# Patient Record
Sex: FEMALE | Race: WHITE | Employment: OTHER | URBAN - METROPOLITAN AREA
[De-identification: names, ages, dates, MRNs, and addresses within clinical notes are randomized per-mention and may not be internally consistent; named-entity substitution may affect disease eponyms.]

---

## 2017-01-01 ENCOUNTER — APPOINTMENT (OUTPATIENT)
Dept: GENERAL RADIOLOGY | Age: 82
DRG: 871 | End: 2017-01-01
Attending: STUDENT IN AN ORGANIZED HEALTH CARE EDUCATION/TRAINING PROGRAM
Payer: MEDICARE

## 2017-01-01 ENCOUNTER — APPOINTMENT (OUTPATIENT)
Dept: GENERAL RADIOLOGY | Age: 82
DRG: 871 | End: 2017-01-01
Attending: EMERGENCY MEDICINE
Payer: MEDICARE

## 2017-01-01 ENCOUNTER — APPOINTMENT (OUTPATIENT)
Dept: ULTRASOUND IMAGING | Age: 82
DRG: 871 | End: 2017-01-01
Attending: INTERNAL MEDICINE
Payer: MEDICARE

## 2017-01-01 ENCOUNTER — HOSPICE ADMISSION (OUTPATIENT)
Dept: HOSPICE | Facility: HOSPICE | Age: 82
End: 2017-01-01

## 2017-01-01 ENCOUNTER — HOSPITAL ENCOUNTER (INPATIENT)
Age: 82
LOS: 16 days | Discharge: NURSING, REHAB OR LONG-TERM CARE | DRG: 871 | End: 2017-03-10
Attending: EMERGENCY MEDICINE | Admitting: STUDENT IN AN ORGANIZED HEALTH CARE EDUCATION/TRAINING PROGRAM
Payer: MEDICARE

## 2017-01-01 ENCOUNTER — APPOINTMENT (OUTPATIENT)
Dept: CT IMAGING | Age: 82
DRG: 871 | End: 2017-01-01
Attending: STUDENT IN AN ORGANIZED HEALTH CARE EDUCATION/TRAINING PROGRAM
Payer: MEDICARE

## 2017-01-01 VITALS
DIASTOLIC BLOOD PRESSURE: 57 MMHG | HEIGHT: 62 IN | SYSTOLIC BLOOD PRESSURE: 121 MMHG | OXYGEN SATURATION: 93 % | TEMPERATURE: 97.7 F | WEIGHT: 259.7 LBS | RESPIRATION RATE: 18 BRPM | BODY MASS INDEX: 47.79 KG/M2 | HEART RATE: 83 BPM

## 2017-01-01 DIAGNOSIS — R09.02 HYPOXIA: ICD-10-CM

## 2017-01-01 DIAGNOSIS — R12 HEART BURN: ICD-10-CM

## 2017-01-01 DIAGNOSIS — E86.0 DEHYDRATION: ICD-10-CM

## 2017-01-01 DIAGNOSIS — N39.0 URINARY TRACT INFECTION WITHOUT HEMATURIA, SITE UNSPECIFIED: ICD-10-CM

## 2017-01-01 DIAGNOSIS — R53.81 DEBILITY: ICD-10-CM

## 2017-01-01 DIAGNOSIS — I50.31 ACUTE DIASTOLIC CONGESTIVE HEART FAILURE (HCC): Primary | ICD-10-CM

## 2017-01-01 DIAGNOSIS — R06.02 SHORTNESS OF BREATH: ICD-10-CM

## 2017-01-01 DIAGNOSIS — F41.9 ANXIETY: ICD-10-CM

## 2017-01-01 DIAGNOSIS — Z66 DNR (DO NOT RESUSCITATE): ICD-10-CM

## 2017-01-01 DIAGNOSIS — Z71.89 GOALS OF CARE, COUNSELING/DISCUSSION: ICD-10-CM

## 2017-01-01 LAB
ALBUMIN SERPL BCP-MCNC: 1.5 G/DL (ref 3.5–5)
ALBUMIN SERPL BCP-MCNC: 1.6 G/DL (ref 3.5–5)
ALBUMIN SERPL BCP-MCNC: 1.7 G/DL (ref 3.5–5)
ALBUMIN SERPL BCP-MCNC: 1.9 G/DL (ref 3.5–5)
ALBUMIN/GLOB SERPL: 0.3 {RATIO} (ref 1.1–2.2)
ALBUMIN/GLOB SERPL: 0.4 {RATIO} (ref 1.1–2.2)
ALBUMIN/GLOB SERPL: 0.5 {RATIO} (ref 1.1–2.2)
ALP SERPL-CCNC: 213 U/L (ref 45–117)
ALP SERPL-CCNC: 228 U/L (ref 45–117)
ALP SERPL-CCNC: 233 U/L (ref 45–117)
ALP SERPL-CCNC: 235 U/L (ref 45–117)
ALP SERPL-CCNC: 264 U/L (ref 45–117)
ALP SERPL-CCNC: 270 U/L (ref 45–117)
ALT SERPL-CCNC: 47 U/L (ref 12–78)
ALT SERPL-CCNC: 60 U/L (ref 12–78)
ALT SERPL-CCNC: 71 U/L (ref 12–78)
ALT SERPL-CCNC: 81 U/L (ref 12–78)
ALT SERPL-CCNC: 94 U/L (ref 12–78)
ALT SERPL-CCNC: 97 U/L (ref 12–78)
ANION GAP BLD CALC-SCNC: 12 MMOL/L (ref 5–15)
ANION GAP BLD CALC-SCNC: 13 MMOL/L (ref 5–15)
ANION GAP BLD CALC-SCNC: 13 MMOL/L (ref 5–15)
ANION GAP BLD CALC-SCNC: 14 MMOL/L (ref 5–15)
ANION GAP BLD CALC-SCNC: 8 MMOL/L (ref 5–15)
APPEARANCE UR: ABNORMAL
APTT PPP: 25.1 SEC (ref 22.1–32.5)
ARTERIAL PATENCY WRIST A: ABNORMAL
AST SERPL W P-5'-P-CCNC: 128 U/L (ref 15–37)
AST SERPL W P-5'-P-CCNC: 141 U/L (ref 15–37)
AST SERPL W P-5'-P-CCNC: 155 U/L (ref 15–37)
AST SERPL W P-5'-P-CCNC: 171 U/L (ref 15–37)
AST SERPL W P-5'-P-CCNC: 213 U/L (ref 15–37)
AST SERPL W P-5'-P-CCNC: 93 U/L (ref 15–37)
ATRIAL RATE: 87 BPM
BACTERIA SPEC CULT: NORMAL
BACTERIA URNS QL MICRO: ABNORMAL /HPF
BASE DEFICIT BLD-SCNC: 5 MMOL/L
BASOPHILS # BLD AUTO: 0 K/UL (ref 0–0.1)
BASOPHILS # BLD: 0 % (ref 0–1)
BDY SITE: ABNORMAL
BILIRUB SERPL-MCNC: 0.6 MG/DL (ref 0.2–1)
BILIRUB SERPL-MCNC: 0.7 MG/DL (ref 0.2–1)
BILIRUB SERPL-MCNC: 0.7 MG/DL (ref 0.2–1)
BILIRUB SERPL-MCNC: 0.8 MG/DL (ref 0.2–1)
BILIRUB UR QL CFM: NEGATIVE
BNP SERPL-MCNC: 1947 PG/ML (ref 0–100)
BUN SERPL-MCNC: 17 MG/DL (ref 6–20)
BUN SERPL-MCNC: 18 MG/DL (ref 6–20)
BUN SERPL-MCNC: 18 MG/DL (ref 6–20)
BUN SERPL-MCNC: 19 MG/DL (ref 6–20)
BUN SERPL-MCNC: 19 MG/DL (ref 6–20)
BUN SERPL-MCNC: 20 MG/DL (ref 6–20)
BUN SERPL-MCNC: 21 MG/DL (ref 6–20)
BUN/CREAT SERPL: 10 (ref 12–20)
BUN/CREAT SERPL: 14 (ref 12–20)
BUN/CREAT SERPL: 15 (ref 12–20)
BUN/CREAT SERPL: 15 (ref 12–20)
BUN/CREAT SERPL: 16 (ref 12–20)
BUN/CREAT SERPL: 18 (ref 12–20)
BUN/CREAT SERPL: 23 (ref 12–20)
CALCIUM SERPL-MCNC: 7.6 MG/DL (ref 8.5–10.1)
CALCIUM SERPL-MCNC: 7.6 MG/DL (ref 8.5–10.1)
CALCIUM SERPL-MCNC: 7.7 MG/DL (ref 8.5–10.1)
CALCIUM SERPL-MCNC: 7.8 MG/DL (ref 8.5–10.1)
CALCIUM SERPL-MCNC: 7.8 MG/DL (ref 8.5–10.1)
CALCIUM SERPL-MCNC: 7.9 MG/DL (ref 8.5–10.1)
CALCIUM SERPL-MCNC: 8 MG/DL (ref 8.5–10.1)
CALCULATED P AXIS, ECG09: 21 DEGREES
CALCULATED R AXIS, ECG10: 177 DEGREES
CALCULATED T AXIS, ECG11: -15 DEGREES
CHLORIDE SERPL-SCNC: 101 MMOL/L (ref 97–108)
CHLORIDE SERPL-SCNC: 103 MMOL/L (ref 97–108)
CHLORIDE SERPL-SCNC: 106 MMOL/L (ref 97–108)
CHLORIDE SERPL-SCNC: 107 MMOL/L (ref 97–108)
CHLORIDE SERPL-SCNC: 108 MMOL/L (ref 97–108)
CHLORIDE SERPL-SCNC: 108 MMOL/L (ref 97–108)
CHLORIDE SERPL-SCNC: 111 MMOL/L (ref 97–108)
CK SERPL-CCNC: 38 U/L (ref 26–192)
CO2 SERPL-SCNC: 19 MMOL/L (ref 21–32)
CO2 SERPL-SCNC: 19 MMOL/L (ref 21–32)
CO2 SERPL-SCNC: 20 MMOL/L (ref 21–32)
CO2 SERPL-SCNC: 21 MMOL/L (ref 21–32)
CO2 SERPL-SCNC: 24 MMOL/L (ref 21–32)
COLOR UR: ABNORMAL
CREAT SERPL-MCNC: 0.9 MG/DL (ref 0.55–1.02)
CREAT SERPL-MCNC: 1.01 MG/DL (ref 0.55–1.02)
CREAT SERPL-MCNC: 1.14 MG/DL (ref 0.55–1.02)
CREAT SERPL-MCNC: 1.22 MG/DL (ref 0.55–1.02)
CREAT SERPL-MCNC: 1.26 MG/DL (ref 0.55–1.02)
CREAT SERPL-MCNC: 1.4 MG/DL (ref 0.55–1.02)
CREAT SERPL-MCNC: 1.89 MG/DL (ref 0.55–1.02)
DIAGNOSIS, 93000: NORMAL
DIFFERENTIAL METHOD BLD: ABNORMAL
EOSINOPHIL # BLD: 0 K/UL (ref 0–0.4)
EOSINOPHIL # BLD: 0 K/UL (ref 0–0.4)
EOSINOPHIL # BLD: 0.1 K/UL (ref 0–0.4)
EOSINOPHIL # BLD: 0.1 K/UL (ref 0–0.4)
EOSINOPHIL NFR BLD: 0 % (ref 0–7)
EOSINOPHIL NFR BLD: 1 % (ref 0–7)
EPITH CASTS URNS QL MICRO: ABNORMAL /LPF
ERYTHROCYTE [DISTWIDTH] IN BLOOD BY AUTOMATED COUNT: 19.2 % (ref 11.5–14.5)
ERYTHROCYTE [DISTWIDTH] IN BLOOD BY AUTOMATED COUNT: 19.6 % (ref 11.5–14.5)
ERYTHROCYTE [DISTWIDTH] IN BLOOD BY AUTOMATED COUNT: 19.8 % (ref 11.5–14.5)
ERYTHROCYTE [DISTWIDTH] IN BLOOD BY AUTOMATED COUNT: 20.4 % (ref 11.5–14.5)
ERYTHROCYTE [DISTWIDTH] IN BLOOD BY AUTOMATED COUNT: 20.7 % (ref 11.5–14.5)
FLUAV AG NPH QL IA: NEGATIVE
FLUBV AG NOSE QL IA: NEGATIVE
GAS FLOW.O2 O2 DELIVERY SYS: ABNORMAL L/MIN
GLOBULIN SER CALC-MCNC: 3.7 G/DL (ref 2–4)
GLOBULIN SER CALC-MCNC: 3.8 G/DL (ref 2–4)
GLOBULIN SER CALC-MCNC: 3.8 G/DL (ref 2–4)
GLOBULIN SER CALC-MCNC: 3.9 G/DL (ref 2–4)
GLOBULIN SER CALC-MCNC: 4.4 G/DL (ref 2–4)
GLOBULIN SER CALC-MCNC: 4.6 G/DL (ref 2–4)
GLUCOSE SERPL-MCNC: 102 MG/DL (ref 65–100)
GLUCOSE SERPL-MCNC: 103 MG/DL (ref 65–100)
GLUCOSE SERPL-MCNC: 115 MG/DL (ref 65–100)
GLUCOSE SERPL-MCNC: 122 MG/DL (ref 65–100)
GLUCOSE SERPL-MCNC: 134 MG/DL (ref 65–100)
GLUCOSE SERPL-MCNC: 148 MG/DL (ref 65–100)
GLUCOSE SERPL-MCNC: 163 MG/DL (ref 65–100)
GLUCOSE SERPL-MCNC: ABNORMAL MG/DL (ref 65–100)
GLUCOSE UR STRIP.AUTO-MCNC: NEGATIVE MG/DL
HCO3 BLD-SCNC: 20.4 MMOL/L (ref 22–26)
HCT VFR BLD AUTO: 33.8 % (ref 35–47)
HCT VFR BLD AUTO: 36.1 % (ref 35–47)
HCT VFR BLD AUTO: 36.3 % (ref 35–47)
HCT VFR BLD AUTO: 42.6 % (ref 35–47)
HCT VFR BLD AUTO: 48 % (ref 35–47)
HGB BLD-MCNC: 11.7 G/DL (ref 11.5–16)
HGB BLD-MCNC: 12 G/DL (ref 11.5–16)
HGB BLD-MCNC: 12.1 G/DL (ref 11.5–16)
HGB BLD-MCNC: 14.1 G/DL (ref 11.5–16)
HGB BLD-MCNC: 16.7 G/DL (ref 11.5–16)
HGB UR QL STRIP: NEGATIVE
INR PPP: 1.2 (ref 0.9–1.1)
INR PPP: 1.4 (ref 0.9–1.1)
KETONES UR QL STRIP.AUTO: NEGATIVE MG/DL
LACTATE SERPL-SCNC: 2.1 MMOL/L (ref 0.4–2)
LACTATE SERPL-SCNC: 3.3 MMOL/L (ref 0.4–2)
LACTATE SERPL-SCNC: 5.1 MMOL/L (ref 0.4–2)
LEUKOCYTE ESTERASE UR QL STRIP.AUTO: ABNORMAL
LIPASE SERPL-CCNC: 49 U/L (ref 73–393)
LIPASE SERPL-CCNC: NORMAL U/L (ref 73–393)
LYMPHOCYTES # BLD AUTO: 13 % (ref 12–49)
LYMPHOCYTES # BLD AUTO: 14 % (ref 12–49)
LYMPHOCYTES # BLD AUTO: 20 % (ref 12–49)
LYMPHOCYTES # BLD AUTO: 20 % (ref 12–49)
LYMPHOCYTES # BLD: 1.7 K/UL (ref 0.8–3.5)
LYMPHOCYTES # BLD: 2 K/UL (ref 0.8–3.5)
LYMPHOCYTES # BLD: 2.3 K/UL (ref 0.8–3.5)
LYMPHOCYTES # BLD: 2.4 K/UL (ref 0.8–3.5)
MAGNESIUM SERPL-MCNC: 1.5 MG/DL (ref 1.6–2.4)
MAGNESIUM SERPL-MCNC: 1.7 MG/DL (ref 1.6–2.4)
MAGNESIUM SERPL-MCNC: 1.8 MG/DL (ref 1.6–2.4)
MAGNESIUM SERPL-MCNC: 1.9 MG/DL (ref 1.6–2.4)
MAGNESIUM SERPL-MCNC: 2.1 MG/DL (ref 1.6–2.4)
MCH RBC QN AUTO: 29.2 PG (ref 26–34)
MCH RBC QN AUTO: 29.3 PG (ref 26–34)
MCH RBC QN AUTO: 29.7 PG (ref 26–34)
MCH RBC QN AUTO: 29.9 PG (ref 26–34)
MCH RBC QN AUTO: 30.4 PG (ref 26–34)
MCHC RBC AUTO-ENTMCNC: 33.1 G/DL (ref 30–36.5)
MCHC RBC AUTO-ENTMCNC: 33.1 G/DL (ref 30–36.5)
MCHC RBC AUTO-ENTMCNC: 33.5 G/DL (ref 30–36.5)
MCHC RBC AUTO-ENTMCNC: 34.6 G/DL (ref 30–36.5)
MCHC RBC AUTO-ENTMCNC: 34.8 G/DL (ref 30–36.5)
MCV RBC AUTO: 86.4 FL (ref 80–99)
MCV RBC AUTO: 87 FL (ref 80–99)
MCV RBC AUTO: 87.4 FL (ref 80–99)
MCV RBC AUTO: 88.8 FL (ref 80–99)
MCV RBC AUTO: 89.9 FL (ref 80–99)
MONOCYTES # BLD: 0.9 K/UL (ref 0–1)
MONOCYTES # BLD: 1.1 K/UL (ref 0–1)
MONOCYTES # BLD: 1.2 K/UL (ref 0–1)
MONOCYTES # BLD: 1.5 K/UL (ref 0–1)
MONOCYTES NFR BLD AUTO: 10 % (ref 5–13)
MONOCYTES NFR BLD AUTO: 8 % (ref 5–13)
NEUTS SEG # BLD: 11.6 K/UL (ref 1.8–8)
NEUTS SEG # BLD: 7.8 K/UL (ref 1.8–8)
NEUTS SEG # BLD: 8.2 K/UL (ref 1.8–8)
NEUTS SEG # BLD: 9.2 K/UL (ref 1.8–8)
NEUTS SEG NFR BLD AUTO: 69 % (ref 32–75)
NEUTS SEG NFR BLD AUTO: 70 % (ref 32–75)
NEUTS SEG NFR BLD AUTO: 77 % (ref 32–75)
NEUTS SEG NFR BLD AUTO: 78 % (ref 32–75)
NITRITE UR QL STRIP.AUTO: NEGATIVE
NRBC # BLD: 0.02 K/UL (ref 0–0.01)
NRBC # BLD: 0.03 K/UL (ref 0–0.01)
NRBC # BLD: 0.14 K/UL (ref 0–0.01)
NRBC BLD-RTO: 0.1 PER 100 WBC
NRBC BLD-RTO: 0.3 PER 100 WBC
NRBC BLD-RTO: 1.3 PER 100 WBC
O2/TOTAL GAS SETTING VFR VENT: 100 %
P-R INTERVAL, ECG05: 176 MS
PCO2 BLD: 36 MMHG (ref 35–45)
PEEP RESPIRATORY: 5 CMH2O
PH BLD: 7.36 [PH] (ref 7.35–7.45)
PH UR STRIP: 5 [PH] (ref 5–8)
PHOSPHATE SERPL-MCNC: 4.1 MG/DL (ref 2.6–4.7)
PHOSPHATE SERPL-MCNC: 5.8 MG/DL (ref 2.6–4.7)
PIP ISTAT,IPIP: 12
PLATELET # BLD AUTO: 228 K/UL (ref 150–400)
PLATELET # BLD AUTO: 242 K/UL (ref 150–400)
PLATELET # BLD AUTO: 261 K/UL (ref 150–400)
PLATELET # BLD AUTO: 269 K/UL (ref 150–400)
PLATELET # BLD AUTO: 273 K/UL (ref 150–400)
PLATELET COMMENTS,PCOM: ABNORMAL
PO2 BLD: 187 MMHG (ref 80–100)
POTASSIUM SERPL-SCNC: 3.1 MMOL/L (ref 3.5–5.1)
POTASSIUM SERPL-SCNC: 3.2 MMOL/L (ref 3.5–5.1)
POTASSIUM SERPL-SCNC: 3.4 MMOL/L (ref 3.5–5.1)
POTASSIUM SERPL-SCNC: 3.7 MMOL/L (ref 3.5–5.1)
POTASSIUM SERPL-SCNC: 3.8 MMOL/L (ref 3.5–5.1)
POTASSIUM SERPL-SCNC: 4.5 MMOL/L (ref 3.5–5.1)
POTASSIUM SERPL-SCNC: 4.6 MMOL/L (ref 3.5–5.1)
PROT SERPL-MCNC: 5.4 G/DL (ref 6.4–8.2)
PROT SERPL-MCNC: 5.5 G/DL (ref 6.4–8.2)
PROT SERPL-MCNC: 6.1 G/DL (ref 6.4–8.2)
PROT SERPL-MCNC: 6.3 G/DL (ref 6.4–8.2)
PROT UR STRIP-MCNC: NEGATIVE MG/DL
PROTHROMBIN TIME: 12.1 SEC (ref 9–11.1)
PROTHROMBIN TIME: 14.7 SEC (ref 9–11.1)
Q-T INTERVAL, ECG07: 430 MS
QRS DURATION, ECG06: 100 MS
QTC CALCULATION (BEZET), ECG08: 517 MS
RBC # BLD AUTO: 3.91 M/UL (ref 3.8–5.2)
RBC # BLD AUTO: 4.09 M/UL (ref 3.8–5.2)
RBC # BLD AUTO: 4.15 M/UL (ref 3.8–5.2)
RBC # BLD AUTO: 4.74 M/UL (ref 3.8–5.2)
RBC # BLD AUTO: 5.49 M/UL (ref 3.8–5.2)
RBC #/AREA URNS HPF: ABNORMAL /HPF (ref 0–5)
RBC MORPH BLD: ABNORMAL
SAO2 % BLD: 100 % (ref 92–97)
SERVICE CMNT-IMP: NORMAL
SODIUM SERPL-SCNC: 133 MMOL/L (ref 136–145)
SODIUM SERPL-SCNC: 138 MMOL/L (ref 136–145)
SODIUM SERPL-SCNC: 138 MMOL/L (ref 136–145)
SODIUM SERPL-SCNC: 139 MMOL/L (ref 136–145)
SODIUM SERPL-SCNC: 140 MMOL/L (ref 136–145)
SODIUM SERPL-SCNC: 140 MMOL/L (ref 136–145)
SODIUM SERPL-SCNC: 143 MMOL/L (ref 136–145)
SP GR UR REFRACTOMETRY: 1.02 (ref 1–1.03)
SPECIMEN TYPE: ABNORMAL
THERAPEUTIC RANGE,PTTT: NORMAL SECS (ref 58–77)
TOTAL RESP. RATE, ITRR: 30
TROPONIN I SERPL-MCNC: 0.04 NG/ML
TSH SERPL DL<=0.05 MIU/L-ACNC: 15.5 UIU/ML (ref 0.36–3.74)
UROBILINOGEN UR QL STRIP.AUTO: 1 EU/DL (ref 0.2–1)
VANCOMYCIN SERPL-MCNC: 12.7 UG/ML
VENTRICULAR RATE, ECG03: 87 BPM
WBC # BLD AUTO: 11.2 K/UL (ref 3.6–11)
WBC # BLD AUTO: 11.3 K/UL (ref 3.6–11)
WBC # BLD AUTO: 11.8 K/UL (ref 3.6–11)
WBC # BLD AUTO: 11.9 K/UL (ref 3.6–11)
WBC # BLD AUTO: 15.1 K/UL (ref 3.6–11)
WBC NRBC COR # BLD: ABNORMAL 10*3/UL
WBC URNS QL MICRO: ABNORMAL /HPF (ref 0–4)

## 2017-01-01 PROCEDURE — 36600 WITHDRAWAL OF ARTERIAL BLOOD: CPT

## 2017-01-01 PROCEDURE — 74011250637 HC RX REV CODE- 250/637: Performed by: INTERNAL MEDICINE

## 2017-01-01 PROCEDURE — 65270000029 HC RM PRIVATE

## 2017-01-01 PROCEDURE — 80202 ASSAY OF VANCOMYCIN: CPT | Performed by: STUDENT IN AN ORGANIZED HEALTH CARE EDUCATION/TRAINING PROGRAM

## 2017-01-01 PROCEDURE — 77010033678 HC OXYGEN DAILY

## 2017-01-01 PROCEDURE — 74011000258 HC RX REV CODE- 258: Performed by: INTERNAL MEDICINE

## 2017-01-01 PROCEDURE — 36415 COLL VENOUS BLD VENIPUNCTURE: CPT | Performed by: HOSPITALIST

## 2017-01-01 PROCEDURE — 74011250636 HC RX REV CODE- 250/636: Performed by: EMERGENCY MEDICINE

## 2017-01-01 PROCEDURE — 85025 COMPLETE CBC W/AUTO DIFF WBC: CPT | Performed by: HOSPITALIST

## 2017-01-01 PROCEDURE — 74011000250 HC RX REV CODE- 250: Performed by: INTERNAL MEDICINE

## 2017-01-01 PROCEDURE — 74011250636 HC RX REV CODE- 250/636: Performed by: INTERNAL MEDICINE

## 2017-01-01 PROCEDURE — C1751 CATH, INF, PER/CENT/MIDLINE: HCPCS

## 2017-01-01 PROCEDURE — 83605 ASSAY OF LACTIC ACID: CPT | Performed by: STUDENT IN AN ORGANIZED HEALTH CARE EDUCATION/TRAINING PROGRAM

## 2017-01-01 PROCEDURE — 83605 ASSAY OF LACTIC ACID: CPT | Performed by: EMERGENCY MEDICINE

## 2017-01-01 PROCEDURE — 84484 ASSAY OF TROPONIN QUANT: CPT | Performed by: EMERGENCY MEDICINE

## 2017-01-01 PROCEDURE — 74011250637 HC RX REV CODE- 250/637: Performed by: STUDENT IN AN ORGANIZED HEALTH CARE EDUCATION/TRAINING PROGRAM

## 2017-01-01 PROCEDURE — 83735 ASSAY OF MAGNESIUM: CPT | Performed by: INTERNAL MEDICINE

## 2017-01-01 PROCEDURE — C9113 INJ PANTOPRAZOLE SODIUM, VIA: HCPCS | Performed by: INTERNAL MEDICINE

## 2017-01-01 PROCEDURE — 36415 COLL VENOUS BLD VENIPUNCTURE: CPT | Performed by: STUDENT IN AN ORGANIZED HEALTH CARE EDUCATION/TRAINING PROGRAM

## 2017-01-01 PROCEDURE — 80053 COMPREHEN METABOLIC PANEL: CPT | Performed by: INTERNAL MEDICINE

## 2017-01-01 PROCEDURE — 96361 HYDRATE IV INFUSION ADD-ON: CPT

## 2017-01-01 PROCEDURE — 94660 CPAP INITIATION&MGMT: CPT

## 2017-01-01 PROCEDURE — 96375 TX/PRO/DX INJ NEW DRUG ADDON: CPT

## 2017-01-01 PROCEDURE — 74011000258 HC RX REV CODE- 258: Performed by: STUDENT IN AN ORGANIZED HEALTH CARE EDUCATION/TRAINING PROGRAM

## 2017-01-01 PROCEDURE — 85730 THROMBOPLASTIN TIME PARTIAL: CPT | Performed by: EMERGENCY MEDICINE

## 2017-01-01 PROCEDURE — 94640 AIRWAY INHALATION TREATMENT: CPT

## 2017-01-01 PROCEDURE — 74011250636 HC RX REV CODE- 250/636: Performed by: STUDENT IN AN ORGANIZED HEALTH CARE EDUCATION/TRAINING PROGRAM

## 2017-01-01 PROCEDURE — 65610000003 HC RM ICU SURGICAL

## 2017-01-01 PROCEDURE — 87040 BLOOD CULTURE FOR BACTERIA: CPT | Performed by: EMERGENCY MEDICINE

## 2017-01-01 PROCEDURE — 83880 ASSAY OF NATRIURETIC PEPTIDE: CPT | Performed by: EMERGENCY MEDICINE

## 2017-01-01 PROCEDURE — 84100 ASSAY OF PHOSPHORUS: CPT | Performed by: INTERNAL MEDICINE

## 2017-01-01 PROCEDURE — 80053 COMPREHEN METABOLIC PANEL: CPT | Performed by: STUDENT IN AN ORGANIZED HEALTH CARE EDUCATION/TRAINING PROGRAM

## 2017-01-01 PROCEDURE — 74011000250 HC RX REV CODE- 250: Performed by: STUDENT IN AN ORGANIZED HEALTH CARE EDUCATION/TRAINING PROGRAM

## 2017-01-01 PROCEDURE — 80053 COMPREHEN METABOLIC PANEL: CPT | Performed by: HOSPITALIST

## 2017-01-01 PROCEDURE — 77030020291 HC FLEXSEAL FMS BMS -C

## 2017-01-01 PROCEDURE — 82947 ASSAY GLUCOSE BLOOD QUANT: CPT | Performed by: EMERGENCY MEDICINE

## 2017-01-01 PROCEDURE — 74011250637 HC RX REV CODE- 250/637: Performed by: PHYSICIAN ASSISTANT

## 2017-01-01 PROCEDURE — 76450000000

## 2017-01-01 PROCEDURE — 99285 EMERGENCY DEPT VISIT HI MDM: CPT

## 2017-01-01 PROCEDURE — 85027 COMPLETE CBC AUTOMATED: CPT | Performed by: INTERNAL MEDICINE

## 2017-01-01 PROCEDURE — 74011250636 HC RX REV CODE- 250/636: Performed by: FAMILY MEDICINE

## 2017-01-01 PROCEDURE — C9113 INJ PANTOPRAZOLE SODIUM, VIA: HCPCS | Performed by: STUDENT IN AN ORGANIZED HEALTH CARE EDUCATION/TRAINING PROGRAM

## 2017-01-01 PROCEDURE — 77030032490 HC SLV COMPR SCD KNE COVD -B

## 2017-01-01 PROCEDURE — 85025 COMPLETE CBC W/AUTO DIFF WBC: CPT | Performed by: EMERGENCY MEDICINE

## 2017-01-01 PROCEDURE — 75810000137 HC PLCMT CENT VENOUS CATH

## 2017-01-01 PROCEDURE — 85025 COMPLETE CBC W/AUTO DIFF WBC: CPT | Performed by: STUDENT IN AN ORGANIZED HEALTH CARE EDUCATION/TRAINING PROGRAM

## 2017-01-01 PROCEDURE — 82550 ASSAY OF CK (CPK): CPT | Performed by: EMERGENCY MEDICINE

## 2017-01-01 PROCEDURE — 85610 PROTHROMBIN TIME: CPT | Performed by: INTERNAL MEDICINE

## 2017-01-01 PROCEDURE — 81001 URINALYSIS AUTO W/SCOPE: CPT | Performed by: EMERGENCY MEDICINE

## 2017-01-01 PROCEDURE — 87804 INFLUENZA ASSAY W/OPTIC: CPT | Performed by: EMERGENCY MEDICINE

## 2017-01-01 PROCEDURE — 83735 ASSAY OF MAGNESIUM: CPT | Performed by: FAMILY MEDICINE

## 2017-01-01 PROCEDURE — 36592 COLLECT BLOOD FROM PICC: CPT

## 2017-01-01 PROCEDURE — 51701 INSERT BLADDER CATHETER: CPT

## 2017-01-01 PROCEDURE — 74011636320 HC RX REV CODE- 636/320: Performed by: EMERGENCY MEDICINE

## 2017-01-01 PROCEDURE — 77030013033 HC MSK BPAP/CPAP MMKA -B

## 2017-01-01 PROCEDURE — 36415 COLL VENOUS BLD VENIPUNCTURE: CPT | Performed by: PHYSICIAN ASSISTANT

## 2017-01-01 PROCEDURE — 77030021668 HC NEB PREFIL KT VYRM -A

## 2017-01-01 PROCEDURE — 71010 XR CHEST PORT: CPT

## 2017-01-01 PROCEDURE — 80053 COMPREHEN METABOLIC PANEL: CPT | Performed by: EMERGENCY MEDICINE

## 2017-01-01 PROCEDURE — 77030027138 HC INCENT SPIROMETER -A

## 2017-01-01 PROCEDURE — 80048 BASIC METABOLIC PNL TOTAL CA: CPT | Performed by: FAMILY MEDICINE

## 2017-01-01 PROCEDURE — 36415 COLL VENOUS BLD VENIPUNCTURE: CPT | Performed by: INTERNAL MEDICINE

## 2017-01-01 PROCEDURE — 77030005563 HC CATH URETH INT MMGH -A

## 2017-01-01 PROCEDURE — 74011000258 HC RX REV CODE- 258: Performed by: EMERGENCY MEDICINE

## 2017-01-01 PROCEDURE — 36415 COLL VENOUS BLD VENIPUNCTURE: CPT | Performed by: EMERGENCY MEDICINE

## 2017-01-01 PROCEDURE — 94762 N-INVAS EAR/PLS OXIMTRY CONT: CPT

## 2017-01-01 PROCEDURE — 93306 TTE W/DOPPLER COMPLETE: CPT

## 2017-01-01 PROCEDURE — 76770 US EXAM ABDO BACK WALL COMP: CPT

## 2017-01-01 PROCEDURE — 85610 PROTHROMBIN TIME: CPT | Performed by: EMERGENCY MEDICINE

## 2017-01-01 PROCEDURE — 74011250637 HC RX REV CODE- 250/637: Performed by: HOSPITALIST

## 2017-01-01 PROCEDURE — 93005 ELECTROCARDIOGRAM TRACING: CPT

## 2017-01-01 PROCEDURE — 51798 US URINE CAPACITY MEASURE: CPT

## 2017-01-01 PROCEDURE — 77030011256 HC DRSG MEPILEX <16IN NO BORD MOLN -A

## 2017-01-01 PROCEDURE — 84443 ASSAY THYROID STIM HORMONE: CPT | Performed by: PHYSICIAN ASSISTANT

## 2017-01-01 PROCEDURE — 74011250636 HC RX REV CODE- 250/636: Performed by: HOSPITALIST

## 2017-01-01 PROCEDURE — 74177 CT ABD & PELVIS W/CONTRAST: CPT

## 2017-01-01 PROCEDURE — 82803 BLOOD GASES ANY COMBINATION: CPT

## 2017-01-01 PROCEDURE — 83735 ASSAY OF MAGNESIUM: CPT | Performed by: STUDENT IN AN ORGANIZED HEALTH CARE EDUCATION/TRAINING PROGRAM

## 2017-01-01 PROCEDURE — 74011000250 HC RX REV CODE- 250: Performed by: HOSPITALIST

## 2017-01-01 PROCEDURE — 71275 CT ANGIOGRAPHY CHEST: CPT

## 2017-01-01 PROCEDURE — 74011000250 HC RX REV CODE- 250: Performed by: PHYSICIAN ASSISTANT

## 2017-01-01 PROCEDURE — 85025 COMPLETE CBC W/AUTO DIFF WBC: CPT | Performed by: INTERNAL MEDICINE

## 2017-01-01 PROCEDURE — 77030011943

## 2017-01-01 PROCEDURE — 83690 ASSAY OF LIPASE: CPT | Performed by: EMERGENCY MEDICINE

## 2017-01-01 PROCEDURE — 84100 ASSAY OF PHOSPHORUS: CPT | Performed by: STUDENT IN AN ORGANIZED HEALTH CARE EDUCATION/TRAINING PROGRAM

## 2017-01-01 PROCEDURE — 77030034850

## 2017-01-01 PROCEDURE — 96365 THER/PROPH/DIAG IV INF INIT: CPT

## 2017-01-01 RX ORDER — ENOXAPARIN SODIUM 100 MG/ML
100 INJECTION SUBCUTANEOUS EVERY 12 HOURS
Status: DISCONTINUED | OUTPATIENT
Start: 2017-01-01 | End: 2017-01-01

## 2017-01-01 RX ORDER — MAGNESIUM SULFATE HEPTAHYDRATE 40 MG/ML
2 INJECTION, SOLUTION INTRAVENOUS ONCE
Status: DISCONTINUED | OUTPATIENT
Start: 2017-01-01 | End: 2017-01-01

## 2017-01-01 RX ORDER — ONDANSETRON 2 MG/ML
4 INJECTION INTRAMUSCULAR; INTRAVENOUS
Status: DISCONTINUED | OUTPATIENT
Start: 2017-01-01 | End: 2017-01-01 | Stop reason: HOSPADM

## 2017-01-01 RX ORDER — BACITRACIN 500 UNIT/G
PACKET (EA) TOPICAL
Status: DISPENSED
Start: 2017-01-01 | End: 2017-01-01

## 2017-01-01 RX ORDER — FENTANYL CITRATE 50 UG/ML
25 INJECTION, SOLUTION INTRAMUSCULAR; INTRAVENOUS
Status: DISCONTINUED | OUTPATIENT
Start: 2017-01-01 | End: 2017-01-01 | Stop reason: HOSPADM

## 2017-01-01 RX ORDER — ALBUTEROL SULFATE 0.83 MG/ML
2.5 SOLUTION RESPIRATORY (INHALATION)
Status: DISCONTINUED | OUTPATIENT
Start: 2017-01-01 | End: 2017-01-01 | Stop reason: HOSPADM

## 2017-01-01 RX ORDER — GLYCOPYRROLATE 0.2 MG/ML
0.2 INJECTION INTRAMUSCULAR; INTRAVENOUS
Status: DISCONTINUED | OUTPATIENT
Start: 2017-01-01 | End: 2017-01-01 | Stop reason: HOSPADM

## 2017-01-01 RX ORDER — POLYVINYL ALCOHOL 14 MG/ML
1 SOLUTION/ DROPS OPHTHALMIC AS NEEDED
Qty: 15 ML | Refills: 0 | Status: SHIPPED | OUTPATIENT
Start: 2017-01-01 | End: 2017-01-01

## 2017-01-01 RX ORDER — POTASSIUM CHLORIDE 20MEQ/15ML
20 LIQUID (ML) ORAL DAILY
Status: DISCONTINUED | OUTPATIENT
Start: 2017-01-01 | End: 2017-01-01

## 2017-01-01 RX ORDER — PANTOPRAZOLE SODIUM 40 MG/1
40 TABLET, DELAYED RELEASE ORAL
Qty: 30 TAB | Refills: 0 | Status: SHIPPED | OUTPATIENT
Start: 2017-01-01

## 2017-01-01 RX ORDER — ACETAMINOPHEN 325 MG/1
650 TABLET ORAL
Status: DISCONTINUED | OUTPATIENT
Start: 2017-01-01 | End: 2017-01-01 | Stop reason: HOSPADM

## 2017-01-01 RX ORDER — VANCOMYCIN/0.9 % SOD CHLORIDE 1.5G/250ML
1500 PLASTIC BAG, INJECTION (ML) INTRAVENOUS ONCE
Status: COMPLETED | OUTPATIENT
Start: 2017-01-01 | End: 2017-01-01

## 2017-01-01 RX ORDER — SODIUM CHLORIDE 0.9 % (FLUSH) 0.9 %
5-10 SYRINGE (ML) INJECTION EVERY 8 HOURS
Status: DISCONTINUED | OUTPATIENT
Start: 2017-01-01 | End: 2017-01-01 | Stop reason: HOSPADM

## 2017-01-01 RX ORDER — FUROSEMIDE 40 MG/1
40 TABLET ORAL DAILY
COMMUNITY
End: 2017-01-01

## 2017-01-01 RX ORDER — LEVOTHYROXINE SODIUM 50 UG/1
25 TABLET ORAL
Status: DISCONTINUED | OUTPATIENT
Start: 2017-01-01 | End: 2017-01-01

## 2017-01-01 RX ORDER — LORAZEPAM 1 MG/1
1 TABLET ORAL
Status: DISCONTINUED | OUTPATIENT
Start: 2017-01-01 | End: 2017-01-01 | Stop reason: HOSPADM

## 2017-01-01 RX ORDER — PANTOPRAZOLE SODIUM 40 MG/1
40 TABLET, DELAYED RELEASE ORAL
Status: DISCONTINUED | OUTPATIENT
Start: 2017-01-01 | End: 2017-01-01 | Stop reason: HOSPADM

## 2017-01-01 RX ORDER — HEPARIN SODIUM 5000 [USP'U]/ML
5000 INJECTION, SOLUTION INTRAVENOUS; SUBCUTANEOUS EVERY 8 HOURS
Status: DISCONTINUED | OUTPATIENT
Start: 2017-01-01 | End: 2017-01-01 | Stop reason: ALTCHOICE

## 2017-01-01 RX ORDER — POLYVINYL ALCOHOL 14 MG/ML
1 SOLUTION/ DROPS OPHTHALMIC AS NEEDED
Status: DISCONTINUED | OUTPATIENT
Start: 2017-01-01 | End: 2017-01-01 | Stop reason: HOSPADM

## 2017-01-01 RX ORDER — HYDROMORPHONE HYDROCHLORIDE 1 MG/ML
0.5 INJECTION, SOLUTION INTRAMUSCULAR; INTRAVENOUS; SUBCUTANEOUS
Status: DISCONTINUED | OUTPATIENT
Start: 2017-01-01 | End: 2017-01-01 | Stop reason: HOSPADM

## 2017-01-01 RX ORDER — SODIUM CHLORIDE 9 MG/ML
3 INJECTION, SOLUTION INTRAVENOUS CONTINUOUS
Status: DISCONTINUED | OUTPATIENT
Start: 2017-01-01 | End: 2017-01-01 | Stop reason: HOSPADM

## 2017-01-01 RX ORDER — VANCOMYCIN HYDROCHLORIDE
1250 EVERY 24 HOURS
Status: DISCONTINUED | OUTPATIENT
Start: 2017-01-01 | End: 2017-01-01

## 2017-01-01 RX ORDER — LEVOFLOXACIN 5 MG/ML
750 INJECTION, SOLUTION INTRAVENOUS
Status: COMPLETED | OUTPATIENT
Start: 2017-01-01 | End: 2017-01-01

## 2017-01-01 RX ORDER — DIPHENOXYLATE HYDROCHLORIDE AND ATROPINE SULFATE 2.5; .025 MG/1; MG/1
1 TABLET ORAL 4 TIMES DAILY
Status: DISCONTINUED | OUTPATIENT
Start: 2017-01-01 | End: 2017-01-01

## 2017-01-01 RX ORDER — LEVOTHYROXINE SODIUM 25 UG/1
25 TABLET ORAL
COMMUNITY

## 2017-01-01 RX ORDER — SODIUM CHLORIDE 0.9 % (FLUSH) 0.9 %
5-10 SYRINGE (ML) INJECTION AS NEEDED
Status: DISCONTINUED | OUTPATIENT
Start: 2017-01-01 | End: 2017-01-01 | Stop reason: HOSPADM

## 2017-01-01 RX ORDER — SUCCINYLCHOLINE CHLORIDE 20 MG/ML
INJECTION INTRAMUSCULAR; INTRAVENOUS
Status: DISPENSED
Start: 2017-01-01 | End: 2017-01-01

## 2017-01-01 RX ORDER — SODIUM CHLORIDE 9 MG/ML
100 INJECTION, SOLUTION INTRAVENOUS CONTINUOUS
Status: DISCONTINUED | OUTPATIENT
Start: 2017-01-01 | End: 2017-01-01

## 2017-01-01 RX ORDER — POTASSIUM CHLORIDE 14.9 MG/ML
10 INJECTION INTRAVENOUS AS NEEDED
Status: COMPLETED | OUTPATIENT
Start: 2017-01-01 | End: 2017-01-01

## 2017-01-01 RX ORDER — GUAIFENESIN 100 MG/5ML
81 LIQUID (ML) ORAL DAILY
COMMUNITY
End: 2017-01-01

## 2017-01-01 RX ORDER — OMEPRAZOLE 40 MG/1
40 CAPSULE, DELAYED RELEASE ORAL
COMMUNITY
End: 2017-01-01

## 2017-01-01 RX ORDER — LORAZEPAM 2 MG/ML
1 INJECTION INTRAMUSCULAR
Status: DISCONTINUED | OUTPATIENT
Start: 2017-01-01 | End: 2017-01-01

## 2017-01-01 RX ORDER — MAGNESIUM SULFATE HEPTAHYDRATE 40 MG/ML
2 INJECTION, SOLUTION INTRAVENOUS ONCE
Status: COMPLETED | OUTPATIENT
Start: 2017-01-01 | End: 2017-01-01

## 2017-01-01 RX ORDER — LORAZEPAM 1 MG/1
1 TABLET ORAL
Qty: 30 TAB | Refills: 0 | Status: SHIPPED | OUTPATIENT
Start: 2017-01-01

## 2017-01-01 RX ORDER — SODIUM CHLORIDE 0.9 % (FLUSH) 0.9 %
10 SYRINGE (ML) INJECTION
Status: COMPLETED | OUTPATIENT
Start: 2017-01-01 | End: 2017-01-01

## 2017-01-01 RX ORDER — ENOXAPARIN SODIUM 100 MG/ML
100 INJECTION SUBCUTANEOUS EVERY 24 HOURS
Status: DISCONTINUED | OUTPATIENT
Start: 2017-01-01 | End: 2017-01-01 | Stop reason: DRUGHIGH

## 2017-01-01 RX ORDER — ERYTHROMYCIN 5 MG/G
OINTMENT OPHTHALMIC EVERY 8 HOURS
Status: DISCONTINUED | OUTPATIENT
Start: 2017-01-01 | End: 2017-01-01 | Stop reason: HOSPADM

## 2017-01-01 RX ORDER — HYDROCODONE BITARTRATE AND ACETAMINOPHEN 5; 325 MG/1; MG/1
2 TABLET ORAL
Qty: 30 TAB | Refills: 0 | Status: SHIPPED | OUTPATIENT
Start: 2017-01-01

## 2017-01-01 RX ORDER — POTASSIUM CHLORIDE 29.8 MG/ML
20 INJECTION INTRAVENOUS
Status: DISCONTINUED | OUTPATIENT
Start: 2017-01-01 | End: 2017-01-01

## 2017-01-01 RX ORDER — LORAZEPAM 2 MG/ML
1 INJECTION INTRAMUSCULAR
Status: DISCONTINUED | OUTPATIENT
Start: 2017-01-01 | End: 2017-01-01 | Stop reason: HOSPADM

## 2017-01-01 RX ORDER — ALBUTEROL SULFATE 0.83 MG/ML
2.5 SOLUTION RESPIRATORY (INHALATION)
Qty: 24 EACH | Refills: 0 | Status: SHIPPED | OUTPATIENT
Start: 2017-01-01

## 2017-01-01 RX ORDER — NOREPINEPHRINE BITARTRATE/D5W 8 MG/250ML
2-16 PLASTIC BAG, INJECTION (ML) INTRAVENOUS
Status: DISCONTINUED | OUTPATIENT
Start: 2017-01-01 | End: 2017-01-01

## 2017-01-01 RX ORDER — SODIUM CHLORIDE 0.9 % (FLUSH) 0.9 %
20 SYRINGE (ML) INJECTION
Status: DISCONTINUED | OUTPATIENT
Start: 2017-01-01 | End: 2017-01-01 | Stop reason: HOSPADM

## 2017-01-01 RX ORDER — HYDROCODONE BITARTRATE AND ACETAMINOPHEN 5; 325 MG/1; MG/1
2 TABLET ORAL
Status: DISCONTINUED | OUTPATIENT
Start: 2017-01-01 | End: 2017-01-01 | Stop reason: HOSPADM

## 2017-01-01 RX ORDER — VANCOMYCIN/0.9 % SOD CHLORIDE 1.5G/250ML
1500 PLASTIC BAG, INJECTION (ML) INTRAVENOUS EVERY 24 HOURS
Status: DISCONTINUED | OUTPATIENT
Start: 2017-01-01 | End: 2017-01-01 | Stop reason: DRUGHIGH

## 2017-01-01 RX ORDER — ACETAMINOPHEN 325 MG/1
650 TABLET ORAL
Qty: 30 TAB | Refills: 0 | Status: SHIPPED | OUTPATIENT
Start: 2017-01-01

## 2017-01-01 RX ORDER — ONDANSETRON 2 MG/ML
4 INJECTION INTRAMUSCULAR; INTRAVENOUS ONCE
Status: COMPLETED | OUTPATIENT
Start: 2017-01-01 | End: 2017-01-01

## 2017-01-01 RX ORDER — ENOXAPARIN SODIUM 100 MG/ML
1 INJECTION SUBCUTANEOUS EVERY 12 HOURS
Status: DISCONTINUED | OUTPATIENT
Start: 2017-01-01 | End: 2017-01-01

## 2017-01-01 RX ORDER — VANCOMYCIN 2 GRAM/500 ML IN 0.9 % SODIUM CHLORIDE INTRAVENOUS
2000 ONCE
Status: COMPLETED | OUTPATIENT
Start: 2017-01-01 | End: 2017-01-01

## 2017-01-01 RX ORDER — HYDROCODONE BITARTRATE AND ACETAMINOPHEN 5; 325 MG/1; MG/1
1 TABLET ORAL
Status: DISCONTINUED | OUTPATIENT
Start: 2017-01-01 | End: 2017-01-01

## 2017-01-01 RX ADMIN — METHYLPREDNISOLONE SODIUM SUCCINATE 40 MG: 40 INJECTION, POWDER, FOR SOLUTION INTRAMUSCULAR; INTRAVENOUS at 10:04

## 2017-01-01 RX ADMIN — Medication 10 ML: at 13:59

## 2017-01-01 RX ADMIN — CASTOR OIL AND BALSAM, PERU: 788; 87 OINTMENT TOPICAL at 18:00

## 2017-01-01 RX ADMIN — ACETAMINOPHEN 650 MG: 325 TABLET, FILM COATED ORAL at 23:52

## 2017-01-01 RX ADMIN — CASTOR OIL AND BALSAM, PERU: 788; 87 OINTMENT TOPICAL at 08:51

## 2017-01-01 RX ADMIN — Medication 10 ML: at 06:10

## 2017-01-01 RX ADMIN — Medication 10 ML: at 22:31

## 2017-01-01 RX ADMIN — HYDROMORPHONE HYDROCHLORIDE 0.5 MG: 1 INJECTION, SOLUTION INTRAMUSCULAR; INTRAVENOUS; SUBCUTANEOUS at 15:09

## 2017-01-01 RX ADMIN — PHENYLEPHRINE HYDROCHLORIDE 60 MCG/MIN: 10 INJECTION INTRAVENOUS at 19:38

## 2017-01-01 RX ADMIN — DIPHENOXYLATE HYDROCHLORIDE AND ATROPINE SULFATE 1 TABLET: 2.5; .025 TABLET ORAL at 23:42

## 2017-01-01 RX ADMIN — LORAZEPAM 1 MG: 2 INJECTION INTRAMUSCULAR; INTRAVENOUS at 23:42

## 2017-01-01 RX ADMIN — PIPERACILLIN SODIUM,TAZOBACTAM SODIUM 3.38 G: 3; .375 INJECTION, POWDER, FOR SOLUTION INTRAVENOUS at 06:52

## 2017-01-01 RX ADMIN — CASTOR OIL AND BALSAM, PERU: 788; 87 OINTMENT TOPICAL at 17:47

## 2017-01-01 RX ADMIN — LEVOTHYROXINE SODIUM ANHYDROUS 12.5 MCG: 100 INJECTION, POWDER, LYOPHILIZED, FOR SOLUTION INTRAVENOUS at 11:15

## 2017-01-01 RX ADMIN — Medication 10 ML: at 07:10

## 2017-01-01 RX ADMIN — CASTOR OIL AND BALSAM, PERU: 788; 87 OINTMENT TOPICAL at 10:47

## 2017-01-01 RX ADMIN — HYDROCODONE BITARTRATE AND ACETAMINOPHEN 2 TABLET: 5; 325 TABLET ORAL at 18:23

## 2017-01-01 RX ADMIN — ENOXAPARIN SODIUM 100 MG: 100 INJECTION SUBCUTANEOUS at 22:25

## 2017-01-01 RX ADMIN — SODIUM CHLORIDE 100 ML/HR: 900 INJECTION, SOLUTION INTRAVENOUS at 18:34

## 2017-01-01 RX ADMIN — Medication 10 ML: at 22:22

## 2017-01-01 RX ADMIN — ALBUTEROL SULFATE 2.5 MG: 2.5 SOLUTION RESPIRATORY (INHALATION) at 13:03

## 2017-01-01 RX ADMIN — PANTOPRAZOLE SODIUM 40 MG: 40 TABLET, DELAYED RELEASE ORAL at 06:34

## 2017-01-01 RX ADMIN — CASTOR OIL AND BALSAM, PERU: 788; 87 OINTMENT TOPICAL at 09:00

## 2017-01-01 RX ADMIN — IOPAMIDOL 100 ML: 755 INJECTION, SOLUTION INTRAVENOUS at 17:43

## 2017-01-01 RX ADMIN — POTASSIUM CHLORIDE 10 MEQ: 200 INJECTION, SOLUTION INTRAVENOUS at 05:48

## 2017-01-01 RX ADMIN — Medication 10 ML: at 02:17

## 2017-01-01 RX ADMIN — SODIUM CHLORIDE 40 MG: 9 INJECTION INTRAMUSCULAR; INTRAVENOUS; SUBCUTANEOUS at 08:33

## 2017-01-01 RX ADMIN — LORAZEPAM 1 MG: 1 TABLET ORAL at 22:21

## 2017-01-01 RX ADMIN — HYDROCODONE BITARTRATE AND ACETAMINOPHEN 2 TABLET: 5; 325 TABLET ORAL at 11:22

## 2017-01-01 RX ADMIN — HYDROCODONE BITARTRATE AND ACETAMINOPHEN 2 TABLET: 5; 325 TABLET ORAL at 06:36

## 2017-01-01 RX ADMIN — HYDROCODONE BITARTRATE AND ACETAMINOPHEN 2 TABLET: 5; 325 TABLET ORAL at 12:31

## 2017-01-01 RX ADMIN — ENOXAPARIN SODIUM 100 MG: 100 INJECTION SUBCUTANEOUS at 21:03

## 2017-01-01 RX ADMIN — Medication 10 ML: at 14:21

## 2017-01-01 RX ADMIN — LEVOFLOXACIN 750 MG: 5 INJECTION, SOLUTION INTRAVENOUS at 13:51

## 2017-01-01 RX ADMIN — ERYTHROMYCIN: 5 OINTMENT OPHTHALMIC at 06:32

## 2017-01-01 RX ADMIN — Medication 6 MCG/MIN: at 01:22

## 2017-01-01 RX ADMIN — SODIUM CHLORIDE 3 ML/HR: 900 INJECTION, SOLUTION INTRAVENOUS at 05:47

## 2017-01-01 RX ADMIN — CASTOR OIL AND BALSAM, PERU: 788; 87 OINTMENT TOPICAL at 17:43

## 2017-01-01 RX ADMIN — HYDROCODONE BITARTRATE AND ACETAMINOPHEN 2 TABLET: 5; 325 TABLET ORAL at 11:38

## 2017-01-01 RX ADMIN — Medication 16 MCG/MIN: at 19:56

## 2017-01-01 RX ADMIN — CASTOR OIL AND BALSAM, PERU: 788; 87 OINTMENT TOPICAL at 18:19

## 2017-01-01 RX ADMIN — SODIUM CHLORIDE 40 MG: 9 INJECTION INTRAMUSCULAR; INTRAVENOUS; SUBCUTANEOUS at 21:02

## 2017-01-01 RX ADMIN — PANTOPRAZOLE SODIUM 40 MG: 40 TABLET, DELAYED RELEASE ORAL at 07:10

## 2017-01-01 RX ADMIN — PANTOPRAZOLE SODIUM 40 MG: 40 TABLET, DELAYED RELEASE ORAL at 06:21

## 2017-01-01 RX ADMIN — PIPERACILLIN SODIUM,TAZOBACTAM SODIUM 3.38 G: 3; .375 INJECTION, POWDER, FOR SOLUTION INTRAVENOUS at 13:57

## 2017-01-01 RX ADMIN — PHENYLEPHRINE HYDROCHLORIDE 200 MCG/MIN: 10 INJECTION INTRAVENOUS at 02:14

## 2017-01-01 RX ADMIN — DIPHENOXYLATE HYDROCHLORIDE AND ATROPINE SULFATE 1 TABLET: 2.5; .025 TABLET ORAL at 14:40

## 2017-01-01 RX ADMIN — Medication 10 ML: at 13:38

## 2017-01-01 RX ADMIN — SODIUM CHLORIDE 250 ML: 900 INJECTION, SOLUTION INTRAVENOUS at 23:26

## 2017-01-01 RX ADMIN — ERYTHROMYCIN: 5 OINTMENT OPHTHALMIC at 22:00

## 2017-01-01 RX ADMIN — PIPERACILLIN SODIUM,TAZOBACTAM SODIUM 3.38 G: 3; .375 INJECTION, POWDER, FOR SOLUTION INTRAVENOUS at 14:12

## 2017-01-01 RX ADMIN — VANCOMYCIN HYDROCHLORIDE 1500 MG: 10 INJECTION, POWDER, LYOPHILIZED, FOR SOLUTION INTRAVENOUS at 09:17

## 2017-01-01 RX ADMIN — CASTOR OIL AND BALSAM, PERU: 788; 87 OINTMENT TOPICAL at 10:02

## 2017-01-01 RX ADMIN — Medication 10 ML: at 06:09

## 2017-01-01 RX ADMIN — CASTOR OIL AND BALSAM, PERU: 788; 87 OINTMENT TOPICAL at 10:09

## 2017-01-01 RX ADMIN — HYDROCODONE BITARTRATE AND ACETAMINOPHEN 2 TABLET: 5; 325 TABLET ORAL at 11:20

## 2017-01-01 RX ADMIN — SODIUM CHLORIDE 100 ML/HR: 900 INJECTION, SOLUTION INTRAVENOUS at 05:38

## 2017-01-01 RX ADMIN — ACETAMINOPHEN 650 MG: 325 TABLET, FILM COATED ORAL at 22:01

## 2017-01-01 RX ADMIN — Medication 10 ML: at 19:32

## 2017-01-01 RX ADMIN — ERYTHROMYCIN: 5 OINTMENT OPHTHALMIC at 16:20

## 2017-01-01 RX ADMIN — SODIUM CHLORIDE 3 ML/HR: 900 INJECTION, SOLUTION INTRAVENOUS at 07:12

## 2017-01-01 RX ADMIN — SODIUM CHLORIDE 3 ML/HR: 900 INJECTION, SOLUTION INTRAVENOUS at 06:52

## 2017-01-01 RX ADMIN — CASTOR OIL AND BALSAM, PERU: 788; 87 OINTMENT TOPICAL at 13:45

## 2017-01-01 RX ADMIN — HYDROCODONE BITARTRATE AND ACETAMINOPHEN 2 TABLET: 5; 325 TABLET ORAL at 11:02

## 2017-01-01 RX ADMIN — ENOXAPARIN SODIUM 100 MG: 100 INJECTION SUBCUTANEOUS at 18:36

## 2017-01-01 RX ADMIN — ENOXAPARIN SODIUM 100 MG: 100 INJECTION SUBCUTANEOUS at 09:20

## 2017-01-01 RX ADMIN — CASTOR OIL AND BALSAM, PERU: 788; 87 OINTMENT TOPICAL at 18:55

## 2017-01-01 RX ADMIN — PIPERACILLIN SODIUM,TAZOBACTAM SODIUM 3.38 G: 3; .375 INJECTION, POWDER, FOR SOLUTION INTRAVENOUS at 22:06

## 2017-01-01 RX ADMIN — MAGNESIUM SULFATE HEPTAHYDRATE 2 G: 40 INJECTION, SOLUTION INTRAVENOUS at 11:34

## 2017-01-01 RX ADMIN — ERYTHROMYCIN: 5 OINTMENT OPHTHALMIC at 23:09

## 2017-01-01 RX ADMIN — PIPERACILLIN SODIUM,TAZOBACTAM SODIUM 3.38 G: 3; .375 INJECTION, POWDER, FOR SOLUTION INTRAVENOUS at 05:48

## 2017-01-01 RX ADMIN — Medication 10 ML: at 06:16

## 2017-01-01 RX ADMIN — ERYTHROMYCIN: 5 OINTMENT OPHTHALMIC at 06:11

## 2017-01-01 RX ADMIN — Medication 18 MCG/MIN: at 11:07

## 2017-01-01 RX ADMIN — PANTOPRAZOLE SODIUM 40 MG: 40 TABLET, DELAYED RELEASE ORAL at 07:03

## 2017-01-01 RX ADMIN — PIPERACILLIN SODIUM,TAZOBACTAM SODIUM 3.38 G: 3; .375 INJECTION, POWDER, FOR SOLUTION INTRAVENOUS at 14:25

## 2017-01-01 RX ADMIN — ENOXAPARIN SODIUM 100 MG: 100 INJECTION SUBCUTANEOUS at 10:17

## 2017-01-01 RX ADMIN — HYDROCODONE BITARTRATE AND ACETAMINOPHEN 2 TABLET: 5; 325 TABLET ORAL at 07:03

## 2017-01-01 RX ADMIN — SODIUM CHLORIDE 40 MG: 9 INJECTION INTRAMUSCULAR; INTRAVENOUS; SUBCUTANEOUS at 22:25

## 2017-01-01 RX ADMIN — SODIUM CHLORIDE: 450 INJECTION, SOLUTION INTRAVENOUS at 12:26

## 2017-01-01 RX ADMIN — PHENYLEPHRINE HYDROCHLORIDE 100 MCG/MIN: 10 INJECTION INTRAVENOUS at 19:08

## 2017-01-01 RX ADMIN — DIPHENOXYLATE HYDROCHLORIDE AND ATROPINE SULFATE 1 TABLET: 2.5; .025 TABLET ORAL at 09:14

## 2017-01-01 RX ADMIN — HYDROCODONE BITARTRATE AND ACETAMINOPHEN 2 TABLET: 5; 325 TABLET ORAL at 18:18

## 2017-01-01 RX ADMIN — CASTOR OIL AND BALSAM, PERU: 788; 87 OINTMENT TOPICAL at 08:39

## 2017-01-01 RX ADMIN — CASTOR OIL AND BALSAM, PERU: 788; 87 OINTMENT TOPICAL at 18:20

## 2017-01-01 RX ADMIN — Medication 8 MCG/MIN: at 22:05

## 2017-01-01 RX ADMIN — HYDROCODONE BITARTRATE AND ACETAMINOPHEN 2 TABLET: 5; 325 TABLET ORAL at 06:35

## 2017-01-01 RX ADMIN — LORAZEPAM 1 MG: 1 TABLET ORAL at 21:49

## 2017-01-01 RX ADMIN — HYDROCODONE BITARTRATE AND ACETAMINOPHEN 2 TABLET: 5; 325 TABLET ORAL at 17:37

## 2017-01-01 RX ADMIN — VANCOMYCIN HYDROCHLORIDE 2000 MG: 10 INJECTION, POWDER, LYOPHILIZED, FOR SOLUTION INTRAVENOUS at 17:00

## 2017-01-01 RX ADMIN — HYDROCODONE BITARTRATE AND ACETAMINOPHEN 2 TABLET: 5; 325 TABLET ORAL at 12:39

## 2017-01-01 RX ADMIN — LORAZEPAM 1 MG: 1 TABLET ORAL at 23:21

## 2017-01-01 RX ADMIN — LORAZEPAM 1 MG: 1 TABLET ORAL at 21:26

## 2017-01-01 RX ADMIN — PIPERACILLIN SODIUM,TAZOBACTAM SODIUM 3.38 G: 3; .375 INJECTION, POWDER, FOR SOLUTION INTRAVENOUS at 22:38

## 2017-01-01 RX ADMIN — SODIUM CHLORIDE 3 ML/HR: 900 INJECTION, SOLUTION INTRAVENOUS at 00:32

## 2017-01-01 RX ADMIN — SODIUM CHLORIDE: 450 INJECTION, SOLUTION INTRAVENOUS at 09:01

## 2017-01-01 RX ADMIN — PANTOPRAZOLE SODIUM 40 MG: 40 TABLET, DELAYED RELEASE ORAL at 06:36

## 2017-01-01 RX ADMIN — ENOXAPARIN SODIUM 100 MG: 100 INJECTION SUBCUTANEOUS at 09:34

## 2017-01-01 RX ADMIN — SODIUM CHLORIDE 1000 ML: 900 INJECTION, SOLUTION INTRAVENOUS at 13:55

## 2017-01-01 RX ADMIN — Medication 10 ML: at 17:43

## 2017-01-01 RX ADMIN — PANTOPRAZOLE SODIUM 40 MG: 40 TABLET, DELAYED RELEASE ORAL at 06:44

## 2017-01-01 RX ADMIN — LEVOTHYROXINE SODIUM 25 MCG: 50 TABLET ORAL at 12:53

## 2017-01-01 RX ADMIN — Medication 10 ML: at 13:25

## 2017-01-01 RX ADMIN — HYDROCODONE BITARTRATE AND ACETAMINOPHEN 2 TABLET: 5; 325 TABLET ORAL at 17:29

## 2017-01-01 RX ADMIN — ENOXAPARIN SODIUM 100 MG: 100 INJECTION SUBCUTANEOUS at 22:38

## 2017-01-01 RX ADMIN — Medication 10 ML: at 22:25

## 2017-01-01 RX ADMIN — SODIUM CHLORIDE 250 ML: 900 INJECTION, SOLUTION INTRAVENOUS at 05:04

## 2017-01-01 RX ADMIN — CASTOR OIL AND BALSAM, PERU: 788; 87 OINTMENT TOPICAL at 17:29

## 2017-01-01 RX ADMIN — SODIUM CHLORIDE: 450 INJECTION, SOLUTION INTRAVENOUS at 02:17

## 2017-01-01 RX ADMIN — SODIUM CHLORIDE 100 ML/HR: 900 INJECTION, SOLUTION INTRAVENOUS at 14:14

## 2017-01-01 RX ADMIN — HYDROCODONE BITARTRATE AND ACETAMINOPHEN 2 TABLET: 5; 325 TABLET ORAL at 07:10

## 2017-01-01 RX ADMIN — METHYLPREDNISOLONE SODIUM SUCCINATE 40 MG: 40 INJECTION, POWDER, FOR SOLUTION INTRAMUSCULAR; INTRAVENOUS at 02:17

## 2017-01-01 RX ADMIN — CASTOR OIL AND BALSAM, PERU: 788; 87 OINTMENT TOPICAL at 12:26

## 2017-01-01 RX ADMIN — POTASSIUM CHLORIDE 10 MEQ: 200 INJECTION, SOLUTION INTRAVENOUS at 07:09

## 2017-01-01 RX ADMIN — CASTOR OIL AND BALSAM, PERU: 788; 87 OINTMENT TOPICAL at 11:23

## 2017-01-01 RX ADMIN — Medication 14 MCG/MIN: at 15:24

## 2017-01-01 RX ADMIN — HYDROCODONE BITARTRATE AND ACETAMINOPHEN 2 TABLET: 5; 325 TABLET ORAL at 12:25

## 2017-01-01 RX ADMIN — ERYTHROMYCIN: 5 OINTMENT OPHTHALMIC at 07:10

## 2017-01-01 RX ADMIN — HYDROCODONE BITARTRATE AND ACETAMINOPHEN 2 TABLET: 5; 325 TABLET ORAL at 17:42

## 2017-01-01 RX ADMIN — HYDROCODONE BITARTRATE AND ACETAMINOPHEN 2 TABLET: 5; 325 TABLET ORAL at 06:34

## 2017-01-01 RX ADMIN — HYDROCODONE BITARTRATE AND ACETAMINOPHEN 2 TABLET: 5; 325 TABLET ORAL at 18:17

## 2017-01-01 RX ADMIN — SODIUM CHLORIDE 40 MG: 9 INJECTION INTRAMUSCULAR; INTRAVENOUS; SUBCUTANEOUS at 09:20

## 2017-01-01 RX ADMIN — HYDROCODONE BITARTRATE AND ACETAMINOPHEN 2 TABLET: 5; 325 TABLET ORAL at 06:43

## 2017-01-01 RX ADMIN — SODIUM CHLORIDE 3 ML/HR: 900 INJECTION, SOLUTION INTRAVENOUS at 06:30

## 2017-01-01 RX ADMIN — CASTOR OIL AND BALSAM, PERU: 788; 87 OINTMENT TOPICAL at 08:38

## 2017-01-01 RX ADMIN — POLYVINYL ALCOHOL 1 DROP: 14 SOLUTION/ DROPS OPHTHALMIC at 22:22

## 2017-01-01 RX ADMIN — Medication 10 ML: at 23:46

## 2017-01-01 RX ADMIN — HYDROMORPHONE HYDROCHLORIDE 0.5 MG: 1 INJECTION, SOLUTION INTRAMUSCULAR; INTRAVENOUS; SUBCUTANEOUS at 22:39

## 2017-01-01 RX ADMIN — Medication 10 MCG/MIN: at 19:44

## 2017-01-01 RX ADMIN — LORAZEPAM 1 MG: 1 TABLET ORAL at 21:01

## 2017-01-01 RX ADMIN — LORAZEPAM 1 MG: 1 TABLET ORAL at 22:34

## 2017-01-01 RX ADMIN — Medication 10 ML: at 23:49

## 2017-01-01 RX ADMIN — PIPERACILLIN SODIUM,TAZOBACTAM SODIUM 3.38 G: 3; .375 INJECTION, POWDER, FOR SOLUTION INTRAVENOUS at 06:04

## 2017-01-01 RX ADMIN — SODIUM CHLORIDE 100 ML/HR: 900 INJECTION, SOLUTION INTRAVENOUS at 00:52

## 2017-01-01 RX ADMIN — Medication 10 ML: at 05:29

## 2017-01-01 RX ADMIN — CASTOR OIL AND BALSAM, PERU: 788; 87 OINTMENT TOPICAL at 19:07

## 2017-01-01 RX ADMIN — CASTOR OIL AND BALSAM, PERU: 788; 87 OINTMENT TOPICAL at 10:15

## 2017-01-01 RX ADMIN — SODIUM CHLORIDE 100 ML/HR: 900 INJECTION, SOLUTION INTRAVENOUS at 01:54

## 2017-01-01 RX ADMIN — SODIUM CHLORIDE 1000 ML: 900 INJECTION, SOLUTION INTRAVENOUS at 13:02

## 2017-01-01 RX ADMIN — Medication 10 ML: at 21:25

## 2017-01-01 RX ADMIN — PHENYLEPHRINE HYDROCHLORIDE 150 MCG/MIN: 10 INJECTION INTRAVENOUS at 03:22

## 2017-01-01 RX ADMIN — Medication 10 ML: at 22:43

## 2017-01-01 RX ADMIN — Medication 10 ML: at 22:06

## 2017-01-01 RX ADMIN — Medication 14 MCG/MIN: at 13:44

## 2017-01-01 RX ADMIN — Medication 6 MCG/MIN: at 21:56

## 2017-01-01 RX ADMIN — ERYTHROMYCIN: 5 OINTMENT OPHTHALMIC at 22:08

## 2017-01-01 RX ADMIN — CASTOR OIL AND BALSAM, PERU: 788; 87 OINTMENT TOPICAL at 19:22

## 2017-01-01 RX ADMIN — PIPERACILLIN SODIUM,TAZOBACTAM SODIUM 3.38 G: 3; .375 INJECTION, POWDER, FOR SOLUTION INTRAVENOUS at 22:25

## 2017-01-01 RX ADMIN — PIPERACILLIN SODIUM,TAZOBACTAM SODIUM 3.38 G: 3; .375 INJECTION, POWDER, FOR SOLUTION INTRAVENOUS at 06:21

## 2017-01-01 RX ADMIN — CASTOR OIL AND BALSAM, PERU: 788; 87 OINTMENT TOPICAL at 10:20

## 2017-01-01 RX ADMIN — ERYTHROMYCIN: 5 OINTMENT OPHTHALMIC at 14:29

## 2017-01-01 RX ADMIN — PIPERACILLIN SODIUM AND TAZOBACTAM SODIUM 3.38 G: 3; .375 INJECTION, POWDER, LYOPHILIZED, FOR SOLUTION INTRAVENOUS at 01:29

## 2017-01-01 RX ADMIN — SODIUM CHLORIDE 40 MG: 9 INJECTION INTRAMUSCULAR; INTRAVENOUS; SUBCUTANEOUS at 09:32

## 2017-01-01 RX ADMIN — PHENYLEPHRINE HYDROCHLORIDE 300 MCG/MIN: 10 INJECTION INTRAVENOUS at 21:03

## 2017-01-01 RX ADMIN — Medication 10 ML: at 16:11

## 2017-01-01 RX ADMIN — HYDROCODONE BITARTRATE AND ACETAMINOPHEN 1 TABLET: 5; 325 TABLET ORAL at 00:53

## 2017-01-01 RX ADMIN — HYDROCODONE BITARTRATE AND ACETAMINOPHEN 2 TABLET: 5; 325 TABLET ORAL at 06:20

## 2017-01-01 RX ADMIN — CASTOR OIL AND BALSAM, PERU: 788; 87 OINTMENT TOPICAL at 17:12

## 2017-01-01 RX ADMIN — SODIUM CHLORIDE 3 ML/HR: 900 INJECTION, SOLUTION INTRAVENOUS at 07:19

## 2017-01-01 RX ADMIN — LORAZEPAM 1 MG: 1 TABLET ORAL at 23:08

## 2017-01-01 RX ADMIN — ERYTHROMYCIN: 5 OINTMENT OPHTHALMIC at 15:22

## 2017-01-01 RX ADMIN — ERYTHROMYCIN: 5 OINTMENT OPHTHALMIC at 21:50

## 2017-01-01 RX ADMIN — SODIUM CHLORIDE 40 MG: 9 INJECTION INTRAMUSCULAR; INTRAVENOUS; SUBCUTANEOUS at 09:34

## 2017-01-01 RX ADMIN — LORAZEPAM 1 MG: 1 TABLET ORAL at 22:22

## 2017-01-01 RX ADMIN — CASTOR OIL AND BALSAM, PERU: 788; 87 OINTMENT TOPICAL at 11:22

## 2017-01-01 RX ADMIN — HYDROCODONE BITARTRATE AND ACETAMINOPHEN 2 TABLET: 5; 325 TABLET ORAL at 13:38

## 2017-01-01 RX ADMIN — ERYTHROMYCIN: 5 OINTMENT OPHTHALMIC at 06:21

## 2017-01-01 RX ADMIN — PHENYLEPHRINE HYDROCHLORIDE 100 MCG/MIN: 10 INJECTION INTRAVENOUS at 09:46

## 2017-01-01 RX ADMIN — ERYTHROMYCIN: 5 OINTMENT OPHTHALMIC at 21:02

## 2017-01-01 RX ADMIN — POLYVINYL ALCOHOL 1 DROP: 14 SOLUTION/ DROPS OPHTHALMIC at 17:36

## 2017-01-01 RX ADMIN — HYDROCODONE BITARTRATE AND ACETAMINOPHEN 2 TABLET: 5; 325 TABLET ORAL at 17:45

## 2017-01-01 RX ADMIN — LORAZEPAM 1 MG: 1 TABLET ORAL at 23:49

## 2017-01-01 RX ADMIN — CASTOR OIL AND BALSAM, PERU: 788; 87 OINTMENT TOPICAL at 20:00

## 2017-01-01 RX ADMIN — PIPERACILLIN SODIUM,TAZOBACTAM SODIUM 3.38 G: 3; .375 INJECTION, POWDER, FOR SOLUTION INTRAVENOUS at 21:44

## 2017-01-01 RX ADMIN — SODIUM CHLORIDE: 450 INJECTION, SOLUTION INTRAVENOUS at 19:06

## 2017-01-01 RX ADMIN — HYDROCODONE BITARTRATE AND ACETAMINOPHEN 2 TABLET: 5; 325 TABLET ORAL at 06:29

## 2017-01-01 RX ADMIN — LEVOTHYROXINE SODIUM 25 MCG: 50 TABLET ORAL at 08:22

## 2017-01-01 RX ADMIN — ERYTHROMYCIN: 5 OINTMENT OPHTHALMIC at 14:32

## 2017-01-01 RX ADMIN — HYDROCODONE BITARTRATE AND ACETAMINOPHEN 2 TABLET: 5; 325 TABLET ORAL at 13:44

## 2017-01-01 RX ADMIN — Medication 15 MCG/MIN: at 15:00

## 2017-01-01 RX ADMIN — SODIUM CHLORIDE 40 MG: 9 INJECTION INTRAMUSCULAR; INTRAVENOUS; SUBCUTANEOUS at 10:16

## 2017-01-01 RX ADMIN — PHENYLEPHRINE HYDROCHLORIDE 150 MCG/MIN: 10 INJECTION INTRAVENOUS at 05:36

## 2017-01-01 RX ADMIN — CASTOR OIL AND BALSAM, PERU: 788; 87 OINTMENT TOPICAL at 18:18

## 2017-01-01 RX ADMIN — LORAZEPAM 1 MG: 1 TABLET ORAL at 22:07

## 2017-01-01 RX ADMIN — PIPERACILLIN SODIUM,TAZOBACTAM SODIUM 3.38 G: 3; .375 INJECTION, POWDER, FOR SOLUTION INTRAVENOUS at 18:33

## 2017-01-01 RX ADMIN — ONDANSETRON 4 MG: 2 INJECTION INTRAMUSCULAR; INTRAVENOUS at 16:13

## 2017-01-01 RX ADMIN — PIPERACILLIN SODIUM,TAZOBACTAM SODIUM 3.38 G: 3; .375 INJECTION, POWDER, FOR SOLUTION INTRAVENOUS at 13:45

## 2017-01-01 RX ADMIN — POTASSIUM CHLORIDE 20 MEQ: 400 INJECTION, SOLUTION INTRAVENOUS at 11:06

## 2017-01-01 RX ADMIN — METHYLPREDNISOLONE SODIUM SUCCINATE 40 MG: 40 INJECTION, POWDER, FOR SOLUTION INTRAMUSCULAR; INTRAVENOUS at 19:31

## 2017-01-01 RX ADMIN — ERYTHROMYCIN: 5 OINTMENT OPHTHALMIC at 16:08

## 2017-01-01 RX ADMIN — LEVOTHYROXINE SODIUM ANHYDROUS 25 MCG: 100 INJECTION, POWDER, LYOPHILIZED, FOR SOLUTION INTRAVENOUS at 12:28

## 2017-01-01 RX ADMIN — HYDROCODONE BITARTRATE AND ACETAMINOPHEN 2 TABLET: 5; 325 TABLET ORAL at 17:11

## 2017-01-01 RX ADMIN — Medication 10 ML: at 21:01

## 2017-01-01 RX ADMIN — ERYTHROMYCIN: 5 OINTMENT OPHTHALMIC at 06:51

## 2017-01-01 RX ADMIN — SODIUM CHLORIDE 100 ML: 900 INJECTION, SOLUTION INTRAVENOUS at 17:43

## 2017-01-01 RX ADMIN — Medication 10 ML: at 06:26

## 2017-01-01 RX ADMIN — Medication 10 MCG/MIN: at 03:03

## 2017-01-01 RX ADMIN — SODIUM CHLORIDE 100 ML/HR: 900 INJECTION, SOLUTION INTRAVENOUS at 16:15

## 2017-01-01 RX ADMIN — PHENYLEPHRINE HYDROCHLORIDE 100 MCG/MIN: 10 INJECTION INTRAVENOUS at 14:24

## 2017-01-01 RX ADMIN — DIPHENOXYLATE HYDROCHLORIDE AND ATROPINE SULFATE 1 TABLET: 2.5; .025 TABLET ORAL at 19:07

## 2017-01-01 RX ADMIN — ENOXAPARIN SODIUM 100 MG: 100 INJECTION SUBCUTANEOUS at 07:34

## 2017-01-01 RX ADMIN — HYDROMORPHONE HYDROCHLORIDE 0.5 MG: 1 INJECTION, SOLUTION INTRAMUSCULAR; INTRAVENOUS; SUBCUTANEOUS at 23:56

## 2017-01-01 RX ADMIN — PIPERACILLIN SODIUM,TAZOBACTAM SODIUM 3.38 G: 3; .375 INJECTION, POWDER, FOR SOLUTION INTRAVENOUS at 06:24

## 2017-01-01 RX ADMIN — Medication 18 MCG/MIN: at 03:25

## 2017-01-01 RX ADMIN — PANTOPRAZOLE SODIUM 40 MG: 40 TABLET, DELAYED RELEASE ORAL at 06:30

## 2017-01-01 RX ADMIN — LEVOTHYROXINE SODIUM ANHYDROUS 25 MCG: 100 INJECTION, POWDER, LYOPHILIZED, FOR SOLUTION INTRAVENOUS at 13:00

## 2017-01-01 RX ADMIN — ENOXAPARIN SODIUM 100 MG: 100 INJECTION SUBCUTANEOUS at 09:58

## 2017-02-22 PROBLEM — J96.91 RESPIRATORY FAILURE WITH HYPOXIA (HCC): Status: ACTIVE | Noted: 2017-01-01

## 2017-02-22 PROBLEM — I95.9 HYPOTENSION: Status: ACTIVE | Noted: 2017-01-01

## 2017-02-22 NOTE — H&P
History & Physical    Date of admission: 2/22/2017    Patient name: Humberto Vegas  MRN: [id number]  YOB: 1935  Age: [de-identified] y.o. Primary care provider:  Tanner Guadalupe MD     Source of Information: patient, medical records and patient's  at bedside                                  Chief complaint: \"short of breath\"    History of present illness  Humberto Vegas is a [de-identified] y.o. female with history of prior pulmonary embolism, pulmonary stenosis, RV failure, HTN, chronic pain, morbid obesity, prior left pleural effusion (?parapneumonic) who presents with shortness of breath x 5 days. She has had multiple recent admissions to [facility] in the past year, most recently seen 12/22-12/29/2016 for chest pain, where she was diagnosed with pneumonia. She had been discharged to home at that time and had reportedly been in her usual state of health until this weekend, when she had developed initially intermittent shortness of breath, which increased in frequency until she woke up this morning and had constant shortness of breath, which prompted her  to call EMS. She had additionally developed abdominal pain with nausea earlier this morning. She was reported to be hypoxic upon EMS arrival with O2 sat 68% on room air. She was placed on 10 liters nasal cannula and subsequently transported to [facility] ED for further evaluation. She additionally reports subjective fever with measured temperature of T 99F on Friday prior to admission with chills. She denies chest pain, cough, congestion, sick contacts, recent changes in her medications, travel. She has actually lost weight per her report. She denies any increasing lower extremity edema. Of note, she does state that she had stopped her anticoagulation because she did not like how it had made her feel (she had been discharged on Xarelto in December).       No other acute concerns raised. Review of systems  A comprehensive review of systems was negative except for that written in the History of Present Illness. The remainder of the review of systems was reviewed and is noncontributory. Past Medical History:   Diagnosis Date    After cataract, bilateral     Arthritis     Chronic pain     back pain, left leg pain    Hypertension     Hypertension, essential, benign 8/1/2016    Pulmonary emboli (HCC)     Pulmonary embolism ([facility] [address] 7/14/2015    Pulmonary stenosis     RVF (right ventricular failure) ([facility] [address] 7/14/2015      Past Surgical History:   Procedure Laterality Date    ABDOMEN SURGERY PROC UNLISTED      cholecystectomy    HX GI      HX ORTHOPAEDIC      bilateral knee replacements     Prior to Admission medications    Medication Sig Start Date End Date Taking? Authorizing Provider   atenolol (TENORMIN) 50 mg tablet Take 1 Tab by mouth daily. 12/30/16   Sarah Cherry NP   amoxicillin-clavulanate (AUGMENTIN) 500-125 mg per tablet Take 1 Tab by mouth every eight (8) hours. 12/29/16   Ina Krause MD   rivaroxaban (XARELTO) 15 mg tab tablet Take 1 Tab by mouth two (2) times daily (with meals). 12/29/16   Ina Krause MD   cyanocobalamin 1,000 mcg tablet Take 1,000 mcg by mouth daily. Historical Provider   ondansetron (ZOFRAN ODT) 4 mg disintegrating tablet Take 4 mg by mouth every eight (8) hours as needed for Nausea. Historical Provider   lidocaine (LIDODERM) 5 % 2 Patches by TransDERmal route every twenty-four (24) hours. Apply patch to the affected area for 12 hours a day and remove for 12 hours a day. Apply to middle of back    Historical Provider   furosemide (LASIX) 10 mg/mL oral solution Take 40 mg by mouth daily. Historical Provider   Lactobacillus acidophilus (ACIDOPHILUS) cap Take 4 Caps by mouth daily. Historical Provider   alum-mag hydroxide-simeth (MYLANTA) 200-200-20 mg/5 mL susp Take 30 mL by mouth three (3) times daily as needed. Historical Provider   pantoprazole (PROTONIX) 40 mg tablet Take 40 mg by mouth daily. Historical Provider   multivitamin, tx-iron-ca-min (THERA-M W/ IRON) 9 mg iron-400 mcg tab tablet Take 1 Tab by mouth daily. Historical Provider   amitriptyline (ELAVIL) 100 mg tablet Take 100 mg by mouth nightly. Historical Provider   calcium-vitamin D (OYSTER SHELL) 500 mg(1,250mg) -200 unit per tablet Take 2 Tabs by mouth two (2) times daily (with meals). Historical Provider     No Known Allergies   No family history on file. Family history reviewed and non-contributory. Social history  Patient resides    Independently    x  With family care      Assisted living      SNF    Ambulates    Independently      With cane     x  Assisted walker         Alcohol history   x  None     Social     Chronic   Smoking history    None   x  Former smoker     Current smoker     History   Smoking Status    Former Smoker   Smokeless Tobacco    Never Used       Code status    Full code   x  DNR/DNI        Code status discussed with the patient/caregivers. Prior        Physical Examination   Visit Vitals    BP 98/69    Pulse 75    Temp 99.9 °F (37.7 °C)    Resp 25    Ht 5' 2\" (1.575 m)    Wt 96.2 kg (212 lb)    SpO2 100%    BMI 38.78 kg/m2      O2 Flow Rate (L/min): 10 l/min   O2 Device: BIPAP    General:  Alert, cooperate, in moderate distress, speaking in 4-5 word sentences on BiPAP   Head:  Normocephalic, without obvious abnormality, atraumatic   Eyes:  Conjunctivae/corneas clear. PERRL, EOMs intact   E/N/M/T: Nares normal. Septum midline.  No nasal drainage or sinus tenderness  Lips, mucosa appear dry, and tongue normal   Teeth and gums normal  Clear oropharynx   Neck: Normal appearance and movements, symmetrical, trachea midline  No palpable adenopathy  No thyroid enlargement, tenderness or nodules  No carotid bruit   Normal JVP   Lungs:   Symmetrical chest expansion, slight use of accessory muscles  Clear to auscultation anteriorly   Chest wall:  No tenderness or deformity   Heart:  Regular  Sounds normal; no appreciable murmur   Abdomen:   Soft, tender to palpation in mid-abdomen without rebound or guarding  Bowel sounds slightly hypoactive  No masses or hepatosplenomegaly  No hernias present   Back: Unable to assess   Extremities: Extremities normal, atraumatic  No cyanosis or edema  No DVT signs   Pulses 2+ and symmetric all extremities   Skin: No rashes or ulcers   Musculo-      skeletal: Gait not tested  Normal symmetry, ROM, strength and tone   Neuro: Normal cranial nerves  Normal reflexes and sensation   Psych: Alert, oriented x3  Normal affect, judgement and insight         Data Review    24 Hour Results:  Recent Results (from the past 24 hour(s))   EKG, 12 LEAD, INITIAL    Collection Time: 02/22/17 11:48 AM   Result Value Ref Range    Ventricular Rate 87 BPM    Atrial Rate 87 BPM    P-R Interval 176 ms    QRS Duration 100 ms    Q-T Interval 430 ms    QTC Calculation (Bezet) 517 ms    Calculated P Axis 21 degrees    Calculated R Axis 177 degrees    Calculated T Axis -15 degrees    Diagnosis       Normal sinus rhythm  Prolonged QT Anterolateral infarct , age undetermined Nonspecific ST and T   wave abnormality  When compared with ECG of 22-DEC-2016 21:20,  Questionable change in QRS axis  Non-specific change in ST segment in Anterior leads  T wave inversion now evident in Anterior leads  QT has lengthened  Confirmed by Terrence UMA Lakhani, Alonzo Phan ([id number]) on 2/22/2017 3:05:38 PM     PROTHROMBIN TIME + INR    Collection Time: 02/22/17 12:02 PM   Result Value Ref Range    INR 1.2 (H) 0.9 - 1.1      Prothrombin time 12.1 (H) 9.0 - 11.1 sec   PTT    Collection Time: 02/22/17 12:02 PM   Result Value Ref Range    aPTT 25.1 22.1 - 32.5 sec    aPTT, therapeutic range     58.0 - 77.0 SECS   URINALYSIS W/MICROSCOPIC    Collection Time: 02/22/17 12:02 PM   Result Value Ref Range    Color DARK YELLOW      Appearance CLOUDY (A) CLEAR      Specific gravity 1.020 1.003 - 1.030      pH (UA) 5.0 5.0 - 8.0      Protein NEGATIVE  NEG mg/dL    Glucose NEGATIVE  NEG mg/dL    Ketone NEGATIVE  NEG mg/dL    Blood NEGATIVE  NEG      Urobilinogen 1.0 0.2 - 1.0 EU/dL    Nitrites NEGATIVE  NEG      Leukocyte Esterase LARGE (A) NEG      WBC 10-20 0 - 4 /hpf    RBC 0-5 0 - 5 /hpf    Epithelial cells FEW FEW /lpf    Bacteria 2+ (A) NEG /hpf   CBC WITH AUTOMATED DIFF    Collection Time: 02/22/17 12:02 PM   Result Value Ref Range    WBC 11.8 (H) 3.6 - 11.0 K/uL    RBC 5.49 (H) 3.80 - 5.20 M/uL    HGB 16.7 (H) 11.5 - 16.0 g/dL    HCT 48.0 (H) 35.0 - 47.0 %    MCV 87.4 80.0 - 99.0 FL    MCH 30.4 26.0 - 34.0 PG    MCHC 34.8 30.0 - 36.5 g/dL    RDW 19.2 (H) 11.5 - 14.5 %    PLATELET 460 342 - 325 K/uL    NEUTROPHILS 78 (H) 32 - 75 %    LYMPHOCYTES 14 12 - 49 %    MONOCYTES 8 5 - 13 %    EOSINOPHILS 0 0 - 7 %    BASOPHILS 0 0 - 1 %    ABS. NEUTROPHILS 9.2 (H) 1.8 - 8.0 K/UL    ABS. LYMPHOCYTES 1.7 0.8 - 3.5 K/UL    ABS. MONOCYTES 0.9 0.0 - 1.0 K/UL    ABS. EOSINOPHILS 0.0 0.0 - 0.4 K/UL    ABS.  BASOPHILS 0.0 0.0 - 0.1 K/UL    DF SMEAR SCANNED      RBC COMMENTS ANISOCYTOSIS  1+       BNP    Collection Time: 02/22/17 12:02 PM   Result Value Ref Range    BNP 1947 (H) 0 - 100 pg/mL   NUCLEATED RBC    Collection Time: 02/22/17 12:02 PM   Result Value Ref Range    NRBC 0.1 (H) 0  WBC    ABSOLUTE NRBC 0.02 (H) 0.00 - 0.01 K/uL    WBC CORRECTED FOR NR ADJUSTED FOR NUCLEATED RBC'S     BILIRUBIN, CONFIRM    Collection Time: 02/22/17 12:02 PM   Result Value Ref Range    Bilirubin UA, confirm NEGATIVE  NEG     LACTIC ACID, PLASMA    Collection Time: 02/22/17 12:13 PM   Result Value Ref Range    Lactic acid 3.3 (HH) 0.4 - 2.0 MMOL/L   GLUCOSE, RANDOM    Collection Time: 02/22/17  1:27 PM   Result Value Ref Range    Glucose 115 (H) 65 - 100 mg/dL   LIPASE    Collection Time: 02/22/17  1:27 PM   Result Value Ref Range    Lipase 49 (L) 73 - 393 U/L   INFLUENZA A & B AG (RAPID TEST)    Collection Time: 02/22/17  1:42 PM   Result Value Ref Range    Influenza A Antigen NEGATIVE  NEG      Influenza B Antigen NEGATIVE  NEG     LIPASE    Collection Time: 02/22/17  2:30 PM   Result Value Ref Range    Lipase QUANTITY NOT SUFFICIENT. SUGGEST RECOLLECTION 73 - 681 U/L   METABOLIC PANEL, COMPREHENSIVE    Collection Time: 02/22/17  2:30 PM   Result Value Ref Range    Sodium 133 (L) 136 - 145 mmol/L    Potassium 4.6 3.5 - 5.1 mmol/L    Chloride 101 97 - 108 mmol/L    CO2 19 (L) 21 - 32 mmol/L    Anion gap 13 5 - 15 mmol/L    Glucose QUANTITY NOT SUFFICIENT. SUGGEST RECOLLECTION 65 - 100 mg/dL    BUN 21 (H) 6 - 20 MG/DL    Creatinine 0.90 0.55 - 1.02 MG/DL    BUN/Creatinine ratio 23 (H) 12 - 20      GFR est AA >60 >60 ml/min/1.73m2    GFR est non-AA >60 >60 ml/min/1.73m2    Calcium 7.6 (L) 8.5 - 10.1 MG/DL    Bilirubin, total 0.8 0.2 - 1.0 MG/DL    ALT (SGPT) 47 12 - 78 U/L    AST (SGOT) 128 (H) 15 - 37 U/L    Alk. phosphatase 264 (H) 45 - 117 U/L    Protein, total 6.1 (L) 6.4 - 8.2 g/dL    Albumin 1.5 (L) 3.5 - 5.0 g/dL    Globulin 4.6 (H) 2.0 - 4.0 g/dL    A-G Ratio 0.3 (L) 1.1 - 2.2     CK W/ REFLX CKMB    Collection Time: 02/22/17  2:30 PM   Result Value Ref Range    CK 38 26 - 192 U/L   TROPONIN I    Collection Time: 02/22/17  2:30 PM   Result Value Ref Range    Troponin-I, Qt. 0.04 <0.05 ng/mL     Recent Labs      02/22/17   1202   WBC  11.8*   HGB  16.7*   HCT  48.0*   PLT  273     Recent Labs      02/22/17   1430  02/22/17   1327  02/22/17   1202   NA  133*   --    --    K  4.6   --    --    CL  101   --    --    CO2  19*   --    --    GLU  QUANTITY NOT SUFFICIENT.  SUGGEST RECOLLECTION  115*   --    BUN  21*   --    --    CREA  0.90   --    --    CA  7.6*   --    --    ALB  1.5*   --    --    SGOT  128*   --    --    ALT  47   --    --    INR   --    --   1.2*       Imaging   CXR 2/22 - no acute process    Assessment and Plan   Principal Problem:    Respiratory failure with hypoxia [facility]) (2/22/2017)    Active Problems:    RVF (right ventricular failure) ([facility] [address] (7/14/2015)      History of pulmonary embolism (8/1/2016)      Hypertension, essential, benign (8/1/2016)      Morbid obesity with body mass index of 40.0-49.9 ([facility] [address] (8/1/2016)      Hypotension (2/22/2017)        Assess: [de-identified]year old female with acute hypoxic respiratory failure, hypotension, abdominal pain; etiology of this is unclear, but given hypoxia / hypotension, concern for pulmonary embolism    1. Acute hypoxic respiratory failure   - etiology unclear at this point, CXR is fairly benign, given history of pulmonary embolism and stopped Xarelto   - CXR 2/22 - no acute process   - check ABG   - check CTA chest   - BiPAP support prn    2. Hypotension   - ? Volume depletion, though no reason for such based upon her history, ? Sepsis, ? Heart failure - would suspect RV failure   - received 2 liters normal saline in ED, BPs after bolus still 65W systolic   - needs central access - spoke with Dr. Moreno 78 - patient is agreeable to central line, may require vasopressor support   - influenza negative   - blood cultures 2/22 - pending   - urinalysis 2/22 - 10-20 WBCs, 2+ bacteria, not entirely convincing for urinary source for infection   - empiric coverage with Zosyn, vancomycin for now, received levofloxacin in ED    3. Elevated BNP   - uncertain significance, she has some elements in her history consistent with heart failure (ie - nocturnal dyspnea becoming more progressive), but appears volume deplete based upon examination, ? RV failure   - Echo 12/23/16 - EF 55-60%, noted RVSP 50 mm Hg, RV systolic function moderately reduced, moderate hypokinesis of the basal-mid free wall and basal-mid diaphragmatic wall   - repeat Echocardiogram   - give IV fluids for now   - hold home atenolol   - cardiology consulted    4.   Lactic acidosis   - suspect this is secondary to volume contraction, but given hypotension as above, will treat empirically for sepsis of unknown etiology   - continue IV fluids     5. Elevated transaminases   - unclear etiology, ? Hepatic congestion vs biliary disorder, pain does not really localize to RUQ on examination   - check CT Abd/Pelvis, if negative and persistently elevated, may need to pursue abdominal ultrasonography   - repeat CMP in AM    6. Abdominal pain   - wide differential could include cholecystitis vs mesenteric ischemia vs gastroenteritis, etc.   - lipase non-elevated   - check CT Abd/Pelvis   - pain control with acetaminophen, Norco, hydromorphone    7. History of pulmonary embolism   - diagnosed in 2015, she had been on Xarelto since her discharge 12/2016, but had stopped   - check CTA chest as above    8. HTN   - she is currently hypotensive, hold home atenolol    9.   Hyponatremia   - suspect this is hypovolemic hyponatremia even with her elevated BNP, will trial fluid hydration    Diet: NPO  Activity: bedrest  DVT prophylaxis: heparin  Isolation precautions: none  Consultations: intensivist, cardiology  Anticipated disposition: requires ICU-level care         Signed by: Maryuri Camp MD     February 22, 2017 at 4:24 PM     Time spent on admission > 75 minutes        Addendum:   - called by radiologist, reported bilateral pulmonary embolism on CTA chest, will start on therapeutic Lovenox

## 2017-02-22 NOTE — PROGRESS NOTES
Admission Medication Reconciliation:    Information obtained from: patient list    Significant PMH/Disease States:   Past Medical History:   Diagnosis Date    After cataract, bilateral     Arthritis     Chronic pain     back pain, left leg pain    Hypertension     Hypertension, essential, benign 8/1/2016    Pulmonary emboli (HCC)     Pulmonary embolism ([facility] [place] [address] 7/14/2015    Pulmonary stenosis     RVF (right ventricular failure) ([facility] [place] [address] 7/14/2015       Chief Complaint for this Admission:  SOB    Allergies:  Review of patient's allergies indicates no known allergies. Prior to Admission Medications:   Prior to Admission Medications   Prescriptions Last Dose Informant Patient Reported? Taking? Lactobacillus acidophilus (ACIDOPHILUS) cap   Yes Yes   Sig: Take 1 Cap by mouth daily (with lunch). amitriptyline (ELAVIL) 100 mg tablet   Yes Yes   Sig: Take 100 mg by mouth nightly. aspirin 81 mg chewable tablet   Yes Yes   Sig: Take 81 mg by mouth daily. atenolol (TENORMIN) 50 mg tablet   No Yes   Sig: Take 1 Tab by mouth daily. calcium-vitamin D (OYSTER SHELL) 500 mg(1,250mg) -200 unit per tablet  Other Yes Yes   Sig: Take 1 Tab by mouth two (2) times daily (with meals). Takes with lunch and dinner   furosemide (LASIX) 40 mg tablet   Yes Yes   Sig: Take 40 mg by mouth daily. levothyroxine (SYNTHROID) 25 mcg tablet   Yes Yes   Sig: Take 25 mcg by mouth Daily (before breakfast). multivitamin, tx-iron-ca-min (THERA-M W/ IRON) 9 mg iron-400 mcg tab tablet   Yes No   Sig: Take 1 Tab by mouth daily (with lunch). omeprazole (PRILOSEC) 40 mg capsule   Yes Yes   Sig: Take 40 mg by mouth daily (with lunch).       Facility-Administered Medications: None         Comments/Recommendations: PTA list in computer updated to reflect medication list provided by family dated 1/27/16  Deleted: protonix, B12, xarelto (patient has stopped taking)  Added: omeprazole, furosemide, levothyroxine  Changed: calcium/vitamin D from 2 tablets to 1 tablets    No medication re ordered by admitting provider yet so no changes needed at this time

## 2017-02-22 NOTE — ROUTINE PROCESS
TRANSFER - OUT REPORT:    Verbal report given to Winsome Feldman on Shruthi Shane  being transferred to CVICU for routine progression of care       Report consisted of patients Situation, Background, Assessment and   Recommendations(SBAR). Information from the following report(s) SBAR, Kardex, ED Summary, Intake/Output, MAR and Recent Results was reviewed with the receiving nurse. Lines:   Triple Lumen triple lumen 02/22/17 Right Internal jugular (Active)   Central Line Being Utilized Yes 2/22/2017  5:17 PM   Site Assessment Clean, dry, & intact 2/22/2017  5:17 PM   Infiltration Assessment 0 2/22/2017  5:17 PM       Peripheral IV 02/22/17 Left Antecubital (Active)       Peripheral IV 02/22/17 Left Hand (Active)   Site Assessment Clean, dry, & intact 2/22/2017  1:56 PM   Phlebitis Assessment 0 2/22/2017  1:56 PM   Infiltration Assessment 0 2/22/2017  1:56 PM   Dressing Status Clean, dry, & intact 2/22/2017  1:56 PM        Opportunity for questions and clarification was provided.       Patient transported with:  Bipap 100% oxygen  Registered Nurse  Monitor

## 2017-02-22 NOTE — PROGRESS NOTES
1800 - Patient arrived to the unit via stretcher, accompanied by 2 RNs and an RT. Transferred to bed. Oriented to her environment, call bell in reach. 1815 - ABG obtained as ordered. PIV lines flushed, flushing well with no phlebitis or infiltration. CXR reviewed, noted that central line is in place. 1900 - Noted that patient began to be hypotensive at this point, please see vitals flowsheet. Started Neosynephrine drip. 1926 - Syed at 300 mcg, paged [facility] Breath - Dr. Ofleia Reese called back, informed her of Syed drip and the BP still low, order to start Levophed. 2000 - Primary Nurse Pedro Greene, GILDA and Jessa Collins RN performed a dual skin assessment on this patient Impairment noted-   Ashu score is 11. Noted skin tear, open in 2 spots on her pannus, foul-smelling. Noted sloughed off skin to her left buttock. Bedside shift change report given to Jessa Collins RN (oncoming nurse) by Ishmael Jamil RN (offgoing nurse). Report included the following information SBAR, Kardex, ED Summary, Intake/Output, MAR, Accordion, Recent Results and Cardiac Rhythm NSR.

## 2017-02-22 NOTE — IP AVS SNAPSHOT
[address] 
[phone] Patient: Alyson Guthrie MRN: [id number] ZOW:0[birth year] You are allergic to the following No active allergies Recent Documentation Height Weight BMI Smoking Status 1.575 m 117.8 kg 47.5 kg/m2 Former Smoker Emergency Contacts  (Rel.) Home Phone Work Phone Mobile Phone Ari Guthrie (Spouse) [phone] -- --  
 Gudelia Pacheco (Child) -- -- [phone] About your hospitalization You were admitted on:  February 22, 2017 You last received care in the:  [facility] You were discharged on:  March 10, 2017 Why you were hospitalized Your primary diagnosis was:  Respiratory Failure With Hypoxia (Hcc) Your diagnoses also included:  Hypotension, History Of Pulmonary Embolism, Hypertension, Essential, Benign, Morbid Obesity With Body Mass Index Of 40.0-49.9 (Hcc), Rvf (Right Ventricular Failure) (Hcc), Heart Burn Providers Seen During Your Hospitalizations Provider Role Specialty Primary office phone Aranza Jones MD Attending Provider Emergency Medicine [phone] Dinorah Lake MD Attending Provider Internal Medicine [phone] Deepa Andino MD Attending Provider Internal Medicine [phone] Edu Apodaca MD Attending Provider Internal Medicine [phone] Raudel Mcguire MD Attending Provider Internal Medicine [phone] Nicola White MD Attending Provider Internal Medicine [phone] Your Primary Care Physician (PCP) Primary Care Physician Office Phone Office Fax Roro Apodaca [phone] Follow-up Information Follow up With Details Comments Contact Info Yoandy Barron MD  pt is a comfort care only as goals of care, f/u as needed [address] [facility] [address] [place] [address] 
[phone] Current Discharge Medication List  
  
START taking these medications Dose & Instructions Dispensing Information Comments Morning Noon Evening Bedtime  
 acetaminophen 325 mg tablet Commonly known as:  TYLENOL Your next dose is: Today, Tomorrow Other:  _________ Dose:  650 mg Take 2 Tabs by mouth every four (4) hours as needed. Quantity:  30 Tab Refills:  0  
     
   
   
   
  
 albuterol 2.5 mg /3 mL (0.083 %) nebulizer solution Commonly known as:  PROVENTIL VENTOLIN Your next dose is: Today, Tomorrow Other:  _________ Dose:  2.5 mg  
3 mL by Nebulization route every six (6) hours as needed for Wheezing or Shortness of Breath. Quantity:  24 Each Refills:  0  
     
   
   
   
  
 balsam peru-castor oil  mg/gram ointment Commonly known as:  Alphonsus Petrin Your next dose is: Today, Tomorrow Other:  _________ Apply  to affected area two (2) times a day. APPLY TO sacrum and panus crease  Nursing, document site in comments Quantity:  1 Tube Refills:  10 HYDROcodone-acetaminophen 5-325 mg per tablet Commonly known as:  Asaf Ill Your next dose is: Today, Tomorrow Other:  _________ Dose:  2 Tab Take 2 Tabs by mouth every four (4) hours as needed for Pain. Max Daily Amount: 12 Tabs. Quantity:  30 Tab Refills:  0 LORazepam 1 mg tablet Commonly known as:  ATIVAN Your next dose is: Today, Tomorrow Other:  _________ Dose:  1 mg Take 1 Tab by mouth nightly. Max Daily Amount: 1 mg. Quantity:  30 Tab Refills:  0  
     
   
   
   
  
 pantoprazole 40 mg tablet Commonly known as:  PROTONIX Your next dose is: Today, Tomorrow Other:  _________ Dose:  40 mg Take 1 Tab by mouth Daily (before breakfast). Quantity:  30 Tab Refills:  0 polyvinyl alcohol 1.4 % ophthalmic solution Commonly known as:  Domingo Shank Your next dose is: Today, Tomorrow Other:  _________ Dose:  1 Drop Administer 1 Drop to both eyes as needed for up to 30 days. Quantity:  15 mL Refills:  0 CONTINUE these medications which have NOT CHANGED Dose & Instructions Dispensing Information Comments Morning Noon Evening Bedtime  
 levothyroxine 25 mcg tablet Commonly known as:  SYNTHROID Your next dose is: Today, Tomorrow Other:  _________ Dose:  25 mcg Take 25 mcg by mouth Daily (before breakfast). Refills:  0 STOP taking these medications ACIDOPHILUS Cap Generic drug:  Lactobacillus acidophilus  
   
  
 amitriptyline 100 mg tablet Commonly known as:  ELAVIL  
   
  
 aspirin 81 mg chewable tablet  
   
  
 atenolol 50 mg tablet Commonly known as:  TENORMIN  
   
  
 calcium-vitamin D 500 mg(1,250mg) -200 unit per tablet Commonly known as:  OYSTER SHELL  
   
  
 furosemide 40 mg tablet Commonly known as:  LASIX  
   
  
 multivitamin, tx-iron-ca-min 9 mg iron-400 mcg Tab tablet Commonly known as:  THERA-M w/ IRON  
   
  
 omeprazole 40 mg capsule Commonly known as:  PRILOSEC Where to Get Your Medications Information on where to get these meds will be given to you by the nurse or doctor. ! Ask your nurse or doctor about these medications  
  acetaminophen 325 mg tablet  
 albuterol 2.5 mg /3 mL (0.083 %) nebulizer solution  
 balsam peru-castor oil  mg/gram ointment HYDROcodone-acetaminophen 5-325 mg per tablet LORazepam 1 mg tablet  
 pantoprazole 40 mg tablet  
 polyvinyl alcohol 1.4 % ophthalmic solution Discharge Instructions Discharge Instructions PATIENT ID: Humberto Vegas MRN: [id number] YOB: 1935 DATE OF ADMISSION: 2/22/2017 11:28 AM   
 DATE OF DISCHARGE: 3/9/2017 PRIMARY CARE PROVIDER: Tanner Guadalupe MD  
 
ATTENDING PHYSICIAN: Bryant Frankel MD 
DISCHARGING PROVIDER: Bryant Frankel MD   
To contact this individual call [phone] and ask the  to page. If unavailable ask to be transferred the Adult Hospitalist Department. DISCHARGE DIAGNOSES pul embolism CONSULTATIONS: IP CONSULT TO HOSPITALIST 
IP CONSULT TO CARDIOLOGY 
IP CONSULT TO NEPHROLOGY PROCEDURES/SURGERIES: * No surgery found * PENDING TEST RESULTS:  
At the time of discharge the following test results are still pending: na 
 
FOLLOW UP APPOINTMENTS:  
Follow-up Information Follow up With Details Comments Contact Info Tanner Guadalupe MD  pt is a comfort care only as goals of care, f/u as needed [address] [facility] [address] [place] [address] 
[phone] ADDITIONAL CARE RECOMMENDATIONS: na 
 
DIET: Regular Diet Oral Nutritional Supplements: Ensure Complete or Ensure Plus Once daily ACTIVITY: Activity as tolerated WOUND CARE:  
 
   
Wound Assessment: present on admission. 1. Left buttock minute open area consistent with moisture/friction irritation measured 0.2cm x 0.2cm x <0.1cm, 100% pink, periwound skin darker pigment/hypopigment mix consistent with chronic moisture/friction irritation; 2. Left abdominal panus with open pink area consistent with moisture breakdown 0.5cm x 1.5cm x 0.1cm; marathon applied;  
  
Wound Care Recommendations: 1. Continue with venelex ointment as ordered and zinc cream in between; may tuck gauze in abdominal fold to absorb moisture;   
   
Skin Care/Prevention Recommendations: 1. Continue continence checks; Apply venelex as ordered and zinc cream with each incontinent episode, use moisturizing cleanser foam to remove soiled surface layer and reapply zinc;  
2. Reposition patient approximately every 2 hours. 3. Assess/protect skin in contact with medical devices. Keep skin moisturized. 4. Float Heels with pillow under calves. 5. Continue on total care bed for pressure redistribution;  
6. Ensure that patient is repositioning in chair shifting weight approximately every 15 minutes and standing up every hour;  
   
   
 
EQUIPMENT needed:   
 
 
  
x SNF/Inpatient Rehab/LTAC Independent/assisted living Hospice Other:  
  
 
 
Signed:  
Bryant Frankel MD 
3/9/2017 
8:49 AM 
 
 
Discharge Orders None Newtron Announcement We are excited to announce that we are making your provider's discharge notes available to you in Newtron. You will see these notes when they are completed and signed by the physician that discharged you from your recent hospital stay. If you have any questions or concerns about any information you see in [email or web address], please call the Health Information Department where you were seen or reach out to your Primary Care Provider for more information about your plan of care. Introducing [facility]! [facility]: 
Thank you for requesting a Newtron account. Our records indicate that you already have an active Newtron account. You can access your account anytime at [email or web address] Did you know that you can access your hospital and ER discharge instructions at any time in Visible World? You can also review all of your test results from your hospital stay or ER visit. Additional Information If you have questions, please visit the Frequently Asked Questions section of the Visible World website at [email or web address] Remember, EyeQuantt is NOT to be used for urgent needs. For medical emergencies, dial 911. Now available from your iPhone and Android! General Information Please provide this summary of care documentation to your next provider. Patient Signature:  ____________________________________________________________ Date:  ____________________________________________________________  
  
[facility] Provider Signature:  ____________________________________________________________ Date:  ____________________________________________________________

## 2017-02-22 NOTE — ED PROVIDER NOTES
HPI Comments: [de-identified] y.o. female with past medical history significant for pulmonary HTN, chronic pain, PE, DVT, conchita filter right ventricular failure, and pulmonary stenosis who presents via EMS with chief complaint of SOB. Pt c/o SOB that has been gradually worsening for the past couple of days with associated chills. The pt says that she needs O2 but denies being on any O2 at home. The pt reports that she recently vomited once. The pt says that she is not eating because she is \"not hungry\" and reports that she last ate about 26 hours ago. The pt notes that she recently stopped taking her anticoagulants. The pt reports that she last had a BM this morning that was normal. Denies cough, fever, CP, abdominal pain, diarrhea, constipation, dysuria, and frequency. There are no other acute medical concerns at this time. PCP: Geneva Cool MD    Note written by Didier Mercado, as dictated by Jessi Noriega MD 11:35 AM      The history is provided by the patient. No  was used. Past Medical History:   Diagnosis Date    After cataract, bilateral     Arthritis     Chronic pain     back pain, left leg pain    Hypertension     Hypertension, essential, benign 8/1/2016    Pulmonary emboli (HCC)     Pulmonary embolism ([facility] 7/14/2015    Pulmonary stenosis     RVF (right ventricular failure) ([facility] [address] 7/14/2015       Past Surgical History:   Procedure Laterality Date    ABDOMEN SURGERY PROC UNLISTED      cholecystectomy    HX GI      HX ORTHOPAEDIC      bilateral knee replacements         No family history on file. Social History     Social History    Marital status:      Spouse name: N/A    Number of children: N/A    Years of education: N/A     Occupational History    Not on file. Social History Main Topics    Smoking status: Former Smoker    Smokeless tobacco: Never Used    Alcohol use 0.0 oz/week     0 Standard drinks or equivalent per week    Drug use:  No  Sexual activity: Not on file     Other Topics Concern    Not on file     Social History Narrative         ALLERGIES: Review of patient's allergies indicates no known allergies. Review of Systems   Constitutional: Positive for chills. Negative for fever. Respiratory: Positive for shortness of breath. Negative for cough. Cardiovascular: Negative for chest pain. Gastrointestinal: Positive for vomiting (once). Negative for abdominal pain, constipation and diarrhea. Genitourinary: Negative for dysuria and frequency. All other systems reviewed and are negative. There were no vitals filed for this visit. Physical Exam   Constitutional: She is oriented to person, place, and time. She appears well-developed and well-nourished. NAD, AxOx4, speaking in complete sentences, on O2;      HENT:   Head: Normocephalic and atraumatic. Nose: Nose normal.   Mouth/Throat: Oropharynx is clear and moist.   Eyes: Conjunctivae and EOM are normal. Pupils are equal, round, and reactive to light. Right eye exhibits no discharge. Left eye exhibits no discharge. No scleral icterus. Neck: Normal range of motion. Neck supple. No JVD present. No tracheal deviation present. Cardiovascular: Normal rate, regular rhythm, normal heart sounds and intact distal pulses. Exam reveals no gallop and no friction rub. No murmur heard. Pulmonary/Chest: Effort normal and breath sounds normal. No respiratory distress. She has no wheezes. She has no rales. She exhibits no tenderness. ctab     Abdominal: Soft. Bowel sounds are normal. There is no tenderness. There is no rebound and no guarding. nttp    Noted well healed scars     Genitourinary: No vaginal discharge found. Genitourinary Comments: Pt denies urinary/ vaginal complaints    In a diaper   Musculoskeletal: Normal range of motion. She exhibits no edema, tenderness or deformity. Neurological: She is alert and oriented to person, place, and time.  She has normal reflexes. No cranial nerve deficit. She exhibits normal muscle tone. Coordination normal.   Skin: Skin is warm and dry. No rash noted. No erythema. No pallor. Psychiatric: She has a normal mood and affect. Her behavior is normal. Thought content normal.   Nursing note and vitals reviewed. MDM  Number of Diagnoses or Management Options  Acute diastolic congestive heart failure ([address]):   Dehydration:   Hypoxia:   Urinary tract infection without hematuria, site unspecified:   Risk of Complications, Morbidity, and/or Mortality  Presenting problems: high  Diagnostic procedures: moderate  Management options: moderate    Critical Care  Total time providing critical care: 30-74 minutes (35 min)    Patient Progress  Patient progress: improved    ED Course       Procedures    Chief Complaint   Patient presents with    Shortness of Breath       11:33 AM  The patients presenting problems have been discussed, and they are in agreement with the care plan formulated and outlined with them. I have encouraged them to ask questions as they arise throughout their visit. MEDICATIONS GIVEN:  Medications - No data to display    LABS REVIEWED:  Labs Reviewed - No data to display    RADIOLOGY RESULTS:  The following have been ordered and reviewed:  _____________________________________________________________________  _____________________________________________________________________    EKG interpretation: (Preliminary)  Rhythm: normal sinus rhythm; and regular . Rate (approx.): 86; Axis: normal; P wave: normal; QRS interval: normal ; ST/T wave: normal; Negative acute significant segmental elevations/ QTc = 517  PROCEDURES:        CONSULTATIONS:       PROGRESS NOTES:      DIAGNOSIS:    No diagnosis found.     PLAN:  1- Acute CHF/ Hypoxia - improved on BIPAP; elevated BNP;   2 Dehydration/ hypotension - will continue IVF as labs appear dehydrated;   3 UTI = Levaquin/ cultures/     ED COURSE: The patients hospital course has been uncomplicated. 2:21 PM  'feels better now (on BIPAP/ IVF); appears dehydrated; e       PROGRESS NOTE:  2:53 PM Pt wishes to be DNR. She does not wish to be intubated or have central lines placed. She and her  are in agreement. CONSULT NOTE:  3:08 PM Helena Rosen MD spoke with Dr. Clarice Lundberg, Consult for Hospitalist.  Discussed available diagnostic tests and clinical findings. He is in agreement with care plans as outlined. Dr. Clarice Lundberg will see and admit pt for further evaluation of treatment. Procedure Note - Central Line Placement:   3:58 PM  Performed by: Helena Rosen MD    Immediately prior to the procedure, the patient was reevaluated and found suitable for the planned procedure and any planned medications. Immediately prior to the procedure a time out was called to verify the correct patient, procedure, equipment, staff, and marking as appropriate. Area was cleansed with Chlorprep and anesthetized with 3mLs of 1% lidocaine. Prepped and draped in sterile fashion. Landmarks identified. 18 gauge needle with triple lumen catheter was inserted into pt's Right, Internal Jugular Vein with ultrasound guidance. Line sutured in place; sterile dressing applied. Position: Trendelenburg  Number of attempts: 2  Estimated blood loss: 4cc  The procedure took 15-30 minutes, and pt tolerated well.

## 2017-02-23 NOTE — PROGRESS NOTES
Problem: Sepsis: Day 2  Goal: *Hemodynamically stable  Outcome: Not Progressing Towards Goal  Variance: Patient Condition  Comments: Patient continues on Neosynephrine and Levophed,volume given.   Goal: *Tolerating diet  Outcome: Not Progressing Towards Goal  NPO on Bipap

## 2017-02-23 NOTE — PROGRESS NOTES
Rounded on [employer] patients and provided Anointing of the Sick at request of family.     Aurora Nassar

## 2017-02-23 NOTE — CONSULTS
Palliative Medicine Consult  Scar: [phone])    Patient Name: Marlene Woo  YOB: 1935    Date of Initial Consult: 2/23/17  Reason for Consult: care decisions  Requesting Provider: Dr. General Gardner   Primary Care Physician: Farzana Haywood MD      SUMMARY:   Marlene Woo is a [de-identified] y.o. with a past history of PE/DVT/IVC, RV failure, pulmonary htn, chronic pain, athritis, who was admitted on 2/22/2017 from home with a diagnosis of SOB, recently stopped a/c, bilateral PE, sepsis, HARRIET. Current medical issues leading to Palliative Medicine involvement include: multiple acute medical issues, care decisions. PALLIATIVE DIAGNOSES:   1. Anxiety / depression - longstanding  2. Shortness of breath - improved  3. Debility  4. Goals of care       PLAN:   1. At this time, pt denies pain, sob, nausea; she does have her same longstanding anxiety / depression; psychosocial support for pt and family; continue close assessment for distressing symptoms  2. I met with pt and her  at bedside; subsequently spoke with Miriam Marcum, chantalr and primary mPOA  3. Discussed with all of them pt current condition and treatments, including PE, on treatment dose lovenox to prevent worsening of this condition, sepsis / possible infection, on IVF and pressors, abx, cultures pending; HARRIET, with low urine output, nephrology involved  4. Reviewed goals of care; pt indicates she is OK with current interventions, but would not want aggressive care if her condition is worsening  5. Spoke with all of them specifically about possibility that dialysis will be recommended as part of treatment; I described to pt and  what dialysis is, what that means, what it's like; pt and dtr both indicate that pt would likely refuse this, even if dialysis would be temporary; I was very clear with them that, if pt's kidneys are shutting down, decision for no dialysis would mean pt would die, they indicated understanding  6.  We discussed that it is possible pt's renal function may improve with current tx; agreed to re-evaluate pt condition 2/24; dtr indicated her concern that even if pt recovers from this acute illness, pt has been living with poor quality of life  7. Discussed with Dr. Sujit Quick about issues concerning dialysis prior to consult  8. Discussed with Dr. Dorina Benitez about family meeting following consult  9. Initial consult note routed to primary continuity provider  10. Communicated plan of care with: Palliative IDT       GOALS OF CARE / TREATMENT PREFERENCES:   [====Goals of Care====]  GOALS OF CARE:  Patient / health care proxy stated goals: recover from current illness with current treatments, if possible; avoid aggressive care at end of life      TREATMENT PREFERENCES:   Code Status: DNR    Advance Care Planning:  Advance Care Planning 2/23/2017   Patient's Healthcare Decision Maker is: Named in scanned ACP document   Primary Decision Maker Name Jack Wyatt   Primary Decision Maker Phone Number [phone]   Primary Decision Maker Relationship to Patient Adult child   Secondary Decision Maker Name Berto Ray   Secondary Decision Maker Relationship to Patient Spouse   Confirm Advance Directive Yes, on file   Does the patient have other document types Do Not Resuscitate       Other:    The palliative care team has discussed with patient / health care proxy about goals of care / treatment preferences for patient.  [====Goals of Care====]         HISTORY:     History obtained from: pt, family, chart    CHIEF COMPLAINT: tired    HPI/SUBJECTIVE:    The patient is:   [x] Verbal and participatory  [] Non-participatory due to:   [de-identified] yof with above history and current issues. On assessment, pt denies pain. She is now off bipap, she denies sob. No nausea. She feels better since time of admission. She feels very tired. She has ongoing, longstanding issues with anxiety / depression.     4 kids -- 1 dtr who is [facility] and lives in [facility], 3 sons who live in [place]. ,  at bedside. Clinical Pain Assessment (nonverbal scale for severity on nonverbal patients):   [++++ Clinical Pain Assessment++++]  [++++Pain Severity++++]: Pain: 0  [++++Pain Character++++]:   [++++Pain Duration++++]:   [++++Pain Effect++++]:   [++++Pain Factors++++]:   [++++Pain Frequency++++]:   [++++Pain Location++++]:   [++++ Clinical Pain Assessment++++]     FUNCTIONAL ASSESSMENT:     Palliative Performance Scale (PPS):  PPS: 30       PSYCHOSOCIAL/SPIRITUAL SCREENING:     Advance Care Planning:  Advance Care Planning 2/23/2017   Patient's Healthcare Decision Maker is: Named in scanned ACP document   Primary Decision Maker Name Janneth Beth   Primary Decision Maker Phone Number [phone]   Primary Decision Maker Relationship to Patient Adult child   Secondary Decision Maker Name Dmitri Cutler   Secondary Decision Maker Relationship to Patient Spouse   Confirm Advance Directive Yes, on file   Does the patient have other document types Do Not Resuscitate        Any spiritual / [employer] concerns:  [] Yes /  [x] No    Caregiver Burnout:  [] Yes /  [x] No /  [] No Caregiver Present      Anticipatory grief assessment:   [x] Normal  / [] Maladaptive       ESAS Anxiety: Anxiety: 7    ESAS Depression: Depression: 7        REVIEW OF SYSTEMS:     Positive and pertinent negative findings in ROS are noted above in HPI. The following systems were [x] reviewed / [] unable to be reviewed as noted in HPI  Other findings are noted below. Systems: constitutional, ears/nose/mouth/throat, respiratory, gastrointestinal, genitourinary, musculoskeletal, integumentary, neurologic, psychiatric, endocrine. Positive findings noted below.   Modified ESAS Completed by: provider   Fatigue: 9 Drowsiness: 1   Depression: 7 Pain: 0   Anxiety: 7 Nausea: 0   Anorexia: 0 Dyspnea: 0     Constipation: No     Stool Occurrence(s): 0        PHYSICAL EXAM:     From RN flowsheet:  Wt Readings from Last 3 Encounters:   02/23/17 216 lb 7.9 oz (98.2 kg)   12/28/16 229 lb 8 oz (104.1 kg)   10/26/16 229 lb 11.2 oz (104.2 kg)     Blood pressure 94/58, pulse 79, temperature 97.9 °F (36.6 °C), resp. rate 21, height 5' 2\" (1.575 m), weight 216 lb 7.9 oz (98.2 kg), SpO2 (!) 89 %. Pain Scale 1: Numeric (0 - 10)  Pain Intensity 1: 0  Pain Onset 1: \"lying in bed\"  Pain Location 1: Back  Pain Orientation 1: Lower  Pain Description 1: Aching  Pain Intervention(s) 1: Medication (see MAR)  Last bowel movement, if known:     Constitutional: aa, nad, fatigued  Eyes: pupils equal, anicteric  ENMT: no nasal discharge, dry mucous membranes  Cardiovascular: regular rhythm, distal pulses intact  Respiratory: breathing not labored, symmetric  Gastrointestinal: soft non-tender, +bowel sounds  Musculoskeletal: no deformity, no tenderness to palpation  Skin: warm, dry  Neurologic: following commands, moving all extremities  Psychiatric: full affect, no hallucinations  Other:       HISTORY:     Principal Problem:    Respiratory failure with hypoxia (HCC) (2/22/2017)    Active Problems:    RVF (right ventricular failure) ([address] (7/14/2015)      History of pulmonary embolism (8/1/2016)      Hypertension, essential, benign (8/1/2016)      Morbid obesity with body mass index of 40.0-49.9 (HCC) (8/1/2016)      Hypotension (2/22/2017)      Past Medical History:   Diagnosis Date    After cataract, bilateral     Arthritis     Chronic pain     back pain, left leg pain    Hypertension     Hypertension, essential, benign 8/1/2016    Pulmonary emboli (HCC)     Pulmonary embolism ([address] 7/14/2015    Pulmonary stenosis     RVF (right ventricular failure) ([address] 7/14/2015      Past Surgical History:   Procedure Laterality Date    ABDOMEN SURGERY PROC UNLISTED      cholecystectomy    HX GI      HX ORTHOPAEDIC      bilateral knee replacements      History reviewed. No pertinent family history.    History reviewed, no pertinent family history.   Social History   Substance Use Topics    Smoking status: Former Smoker    Smokeless tobacco: Never Used    Alcohol use No     No Known Allergies   Current Facility-Administered Medications   Medication Dose Route Frequency    0.9% sodium chloride infusion  3 mL/hr IntraVENous CONTINUOUS    perflutren lipid microspheres (DEFINITY) in NS bolus IV  1.5 mL IntraVENous RAD ONCE    sodium chloride (NS) flush 20 mL  20 mL IntraVENous ONCE PRN    levothyroxine (SYNTHROID) injection 12.5 mcg  12.5 mcg IntraVENous Q24H    [START ON 2/24/2017] vancomycin random level with am labs on 2/24   Other ONCE    [START ON 2/24/2017] enoxaparin (LOVENOX) injection 100 mg  100 mg SubCUTAneous Q24H    sodium chloride (NS) flush 5-10 mL  5-10 mL IntraVENous Q8H    sodium chloride (NS) flush 5-10 mL  5-10 mL IntraVENous PRN    HYDROmorphone (PF) (DILAUDID) injection 0.5 mg  0.5 mg IntraVENous Q4H PRN    acetaminophen (TYLENOL) tablet 650 mg  650 mg Oral Q4H PRN    HYDROcodone-acetaminophen (NORCO) 5-325 mg per tablet 1 Tab  1 Tab Oral Q4H PRN    ondansetron (ZOFRAN) injection 4 mg  4 mg IntraVENous Q6H PRN    PHENYLephrine (NEOSYNEPHRINE) 30,000 mcg in 0.9% sodium chloride 250 mL infusion   mcg/min IntraVENous TITRATE    0.9% sodium chloride infusion  100 mL/hr IntraVENous CONTINUOUS    piperacillin-tazobactam (ZOSYN) 3.375 g in 0.9% sodium chloride (MBP/ADV) 100 mL  3.375 g IntraVENous Q8H    pantoprazole (PROTONIX) 40 mg in sodium chloride 0.9 % 10 mL injection  40 mg IntraVENous DAILY    Vancomycin - pharmacy to dose   Other Rx Dosing/Monitoring    NOREPINephrine (LEVOPHED) 8,000 mcg in dextrose 5% 250 mL infusion  2-16 mcg/min IntraVENous TITRATE          LAB AND IMAGING FINDINGS:     Lab Results   Component Value Date/Time    WBC 15.1 02/23/2017 04:09 AM    HGB 14.1 02/23/2017 04:09 AM    PLATELET 443 12/98/5943 04:09 AM     Lab Results   Component Value Date/Time    Sodium 138 02/23/2017 04:09 AM    Potassium 4.5 02/23/2017 04:09 AM    Chloride 103 02/23/2017 04:09 AM    CO2 21 02/23/2017 04:09 AM    BUN 19 02/23/2017 04:09 AM    Creatinine 1.89 02/23/2017 04:09 AM    Calcium 7.9 02/23/2017 04:09 AM    Magnesium 1.9 02/23/2017 04:09 AM    Phosphorus 5.8 02/23/2017 04:09 AM      Lab Results   Component Value Date/Time    AST (SGOT) 155 02/23/2017 04:09 AM    Alk. phosphatase 270 02/23/2017 04:09 AM    Protein, total 6.3 02/23/2017 04:09 AM    Albumin 1.9 02/23/2017 04:09 AM    Globulin 4.4 02/23/2017 04:09 AM     Lab Results   Component Value Date/Time    INR 1.2 02/22/2017 12:02 PM    Prothrombin time 12.1 02/22/2017 12:02 PM    aPTT 25.1 02/22/2017 12:02 PM      No results found for: IRON, FE, TIBC, IBCT, PSAT, FERR   No results found for: PH, PCO2, PO2  No components found for: [place]   Lab Results   Component Value Date/Time     12/22/2016 09:29 PM    CK - MB 2.7 12/22/2016 09:29 PM                Total time: 70min  Counseling / coordination time: 45min  > 50% counseling / coordination?: y    Prolonged service was provided for  []30 min   []75 min in face to face time in the presence of the patient. Time Start:   Time End:   Note: this can only be billed with 65499 (initial) or 46512 (follow up). If multiple start / stop times, list each separately.

## 2017-02-23 NOTE — CONSULTS
NEPHROLOGY CONSULT NOTE     Patient: Mee Polanco MRN: [id number]  PCP: Joao Clinton MD   :     1935  Age:   [de-identified] y.o. Sex:  female      Referring physician: Trey Royal MD  Reason for consultation: [de-identified] y.o. female with Respiratory failure with hypoxia ([address] complicated by HARRIET   Admission Date: 2017 11:28 AM  LOS: 1 day      ASSESSMENT and PLAN :   Acute Kidney Injury:  - likely from ATN due to hypotension/? Sepsis + contrast vs volume depletion vs obstruction  - cont IVF  - pressors to maintain MAP > 70  - renal U/S  - likely to require HD in the next 24 hours unless her UOP increases - pt did not want anything invasive done and is a DNR. Will discuss with her and her family as well    Hypotension:  - ? Hemodynamic compromise from PE vs sepsis  - on lovenox and abx  - infectious w/u underway  - ECHO ordered  - pressors to maintain MAP as above    Resp Failure:  - 2/2 multiple PEs  - AC as above  - BiPAP for support    Lytes:  - K and bicarb stable for now    Multiple PEs:  - on lovenox    Hypothyroidism       Active Problems / Assessment AAActive  :   Principal Problem:    Respiratory failure with hypoxia ([address] (2017)    Active Problems:    RVF (right ventricular failure) ([address] (2015)      History of pulmonary embolism (2016)      Hypertension, essential, benign (2016)      Morbid obesity with body mass index of 40.0-49.9 (HCC) (2016)      Hypotension (2017)         Subjective:   HPI: Mee Polanco is a [de-identified] y.o.  female who has been admitted to the hospital for shortness of breath for about 5 days prior to admission and abdominal pain. She has a  Hx of PE, pulm stenosis, RV failure, HTN, obesity. She was found to be hypoxic with O2 sat of 68% and hypotensive. She had stopped her Xarelto as she did not like how it made her feel.   A CTA of her chest revealed multiple b/l PEs, no saddle embolus, and CT A/P w/o contrast revealed hyperemia and straightening/narrowing of the left colon possibly infectious vs ischemic. She was transferred to the CVICU on BiPAP and ruth for BP. Her baseline Cr was 0.9 on admission and fran to 1.9 this AM.  Her UOP has been minimal since admission. Currently, she is on BiPAP and unable to provide much of a hx. Past Medical Hx:   Past Medical History:   Diagnosis Date    After cataract, bilateral     Arthritis     Chronic pain     back pain, left leg pain    Hypertension     Hypertension, essential, benign 8/1/2016    Pulmonary emboli (HCC)     Pulmonary embolism ([facility] 7/14/2015    Pulmonary stenosis     RVF (right ventricular failure) ([facility] 7/14/2015        Past Surgical Hx:     Past Surgical History:   Procedure Laterality Date    ABDOMEN SURGERY PROC UNLISTED      cholecystectomy    HX GI      HX ORTHOPAEDIC      bilateral knee replacements       Medications:  Prior to Admission medications    Medication Sig Start Date End Date Taking? Authorizing Provider   furosemide (LASIX) 40 mg tablet Take 40 mg by mouth daily. Yes Historical Provider   aspirin 81 mg chewable tablet Take 81 mg by mouth daily. Yes Historical Provider   levothyroxine (SYNTHROID) 25 mcg tablet Take 25 mcg by mouth Daily (before breakfast). Yes Historical Provider   omeprazole (PRILOSEC) 40 mg capsule Take 40 mg by mouth daily (with lunch). Yes Historical Provider   atenolol (TENORMIN) 50 mg tablet Take 1 Tab by mouth daily. 12/30/16  Yes Kathrin Witt NP   Lactobacillus acidophilus (ACIDOPHILUS) cap Take 1 Cap by mouth daily (with lunch). Yes Historical Provider   amitriptyline (ELAVIL) 100 mg tablet Take 100 mg by mouth nightly. Yes Historical Provider   calcium-vitamin D (OYSTER SHELL) 500 mg(1,250mg) -200 unit per tablet Take 1 Tab by mouth two (2) times daily (with meals).  Takes with lunch and dinner   Yes Historical Provider   multivitamin, tx-iron-ca-min (THERA-M W/ IRON) 9 mg iron-400 mcg tab tablet Take 1 Tab by mouth daily (with lunch). Historical Provider       No Known Allergies    Social Hx:  reports that she has quit smoking. She has never used smokeless tobacco. She reports that she does not drink alcohol or use illicit drugs. History reviewed. No pertinent family history. Review of Systems:  A twelve point review of system was performed today. Pertinent positives and negatives are mentioned in the HPI. The reminder of the ROS is negative and noncontributory. Objective:    Vitals:    Vitals:    02/23/17 0630 02/23/17 0700 02/23/17 0800 02/23/17 0835   BP: 96/69 103/66 97/69    Pulse: 81 79 78    Resp: 25 15 20    Temp:   97.6 °F (36.4 °C)    SpO2: 95% 99% 98% 98%   Weight:       Height:         I&O's:  02/22 0701 - 02/23 0700  In: 3167.7 [I.V.:3167.7]  Out: 153 [Urine:153]  Visit Vitals    BP 97/69    Pulse 78    Temp 97.6 °F (36.4 °C)    Resp 20    Ht 5' 2\" (1.575 m)    Wt 98.2 kg (216 lb 7.9 oz)    SpO2 98%    BMI 39.6 kg/m2       Physical Exam:  General:Alert, on BiPAP  HEENT: Eyes are PERRL. EOMI  Neck:Supple,no mass palpable  Lungs : Clears to auscultation Bilaterally, Normal respiratory effort  CVS: RRR, S1 S2 normal, No rub, trace LE edema  Abdomen: Soft, mild tenderness on left, No hepatosplenomegaly, bowel sounds present  Extremities: No cyanosis, No clubbing  Skin: No rash or lesions.   Neurologic: non focal, AAO x 3      Laboratory Results:    Lab Results   Component Value Date    BUN 19 02/23/2017     02/23/2017    K 4.5 02/23/2017     02/23/2017    CO2 21 02/23/2017       Lab Results   Component Value Date    BUN 19 02/23/2017    BUN 21 (H) 02/22/2017    BUN 5 (L) 12/28/2016    BUN 7 12/27/2016    BUN 8 12/26/2016    K 4.5 02/23/2017    K 4.6 02/22/2017    K 3.9 12/28/2016    K 3.9 12/27/2016    K 3.4 (L) 12/26/2016       Lab Results   Component Value Date    WBC 15.1 (H) 02/23/2017    RBC 4.74 02/23/2017    HGB 14.1 02/23/2017    HCT 42.6 02/23/2017    MCV 89.9 02/23/2017    MCH 29.7 02/23/2017    RDW 19.6 (H) 02/23/2017     02/23/2017       Lab Results   Component Value Date    PHOS 5.8 (H) 02/23/2017       Urine dipstick:   Lab Results   Component Value Date/Time    Color DARK YELLOW 02/22/2017 12:02 PM    Appearance CLOUDY 02/22/2017 12:02 PM    Specific gravity 1.020 02/22/2017 12:02 PM    pH (UA) 5.0 02/22/2017 12:02 PM    Protein NEGATIVE  02/22/2017 12:02 PM    Glucose NEGATIVE  02/22/2017 12:02 PM    Ketone NEGATIVE  02/22/2017 12:02 PM    Bilirubin NEGATIVE  12/22/2016 11:01 PM    Urobilinogen 1.0 02/22/2017 12:02 PM    Nitrites NEGATIVE  02/22/2017 12:02 PM    Leukocyte Esterase LARGE 02/22/2017 12:02 PM    Epithelial cells FEW 02/22/2017 12:02 PM    Bacteria 2+ 02/22/2017 12:02 PM    WBC 10-20 02/22/2017 12:02 PM    RBC 0-5 02/22/2017 12:02 PM       I have reviewed the following: All pertinent labs, microbiology data, radiology imaging for my assessment         Thank you for allowing us to participate in the care of this patient. We will follow patient.  Please dont hesitate to call with any questions    Caden Le MD  2/23/2017    [address] [place] [address]

## 2017-02-23 NOTE — PROGRESS NOTES
[address] Alisia Alvarado notified of low urine output-10 mls last hour.  ml bolus x1 ordered. 0000 Patient continues with low urine output,weaning Neosynephrine ,remains on Levophed to keep MAP >65.  0430 Dr Alisia Alvarado aware of continued low urine output. Orders given to give 250 mls NS x1 now. Patient placed on 6L Nasal Cannula,color pasty grey with 02 sats 87-88%. Placed back on Bipap 12/2 ,rate 4. Fi02 70%.  0600 Dr Alisia Alvarado aware of continued low urine output,CVP 17. 500 mls NS to be given over 1 hr ordered. Also aware lactic acid 5.1 this am.  0700 Dr Sterling Hurd consulted and in to see patient. 0745 Bedside and Verbal shift change report given to Kimberly VO (oncoming nurse) . Report included the following information SBAR, Kardex, ED Summary, Intake/Output, MAR and Recent Results.

## 2017-02-23 NOTE — PROGRESS NOTES
Problem: Heart Failure: Discharge Outcomes  Goal: *Demonstrates ability to perform prescribed activity without shortness of breath or discomfort  Outcome: Not Progressing Towards Goal  Patient has bilateral PE's and is requiring BIPAP  Goal: *Left ventricular function assessment completed prior to or during stay, or planned for post-discharge  Outcome: Not Progressing Towards Goal  Patient is requiring high amounts of vasopressors between the heart failure and her Sepsis

## 2017-02-23 NOTE — PROGRESS NOTES
Problem: Sepsis: Day 2  Goal: *Central Venous Pressure maintained at 8-12 mm Hg  Outcome: Progressing Towards Goal  CVP is up to about 14, currently she is going into some degree of renal failure.  Nephrology has been consulted  Goal: *Hemodynamically stable  Outcome: Not Progressing Towards Goal  On very high amounts of levophed and Syed  Goal: *Tolerating diet  Outcome: Not Progressing Towards Goal  NPO as she is on continous BiPap  Goal: Consults, if ordered  Outcome: Progressing Towards Goal  She currently has orders for Palliative care, cardiology, and Nephrology

## 2017-02-23 NOTE — PROGRESS NOTES
Palliative Medicine    Brief note, full consult to follow. I met with pt,  and also spoke with pt's mPOA / dtr Srikanth Carlos. Pt would likely refuse dialysis, even if this were to only be temporary. I was very clear with pt /  and dtr that were she to do this and her kidneys were failing she would die, they all indicated their understanding. Spoke with Dr. Leonid Singh prior to consult and Dr. Greg Bowman after.

## 2017-02-23 NOTE — PROGRESS NOTES
Hospitalist Progress Note  Renata Peterson MD  Office: [phone]  Cell: [phone]      Date of Service:  2017  NAME:  Corby Miranda  :  1935  MRN:  [id number]      Admission Summary:   Corby Miranda is a [de-identified] y.o. female with history of prior pulmonary embolism, pulmonary stenosis, RV failure, HTN, chronic pain, morbid obesity, prior left pleural effusion (?parapneumonic) who presents with shortness of breath x 5 days. She has had multiple recent admissions to [facility] in the past year, most recently seen -2016 for chest pain, where she was diagnosed with pneumonia. She had been discharged to home at that time and had reportedly been in her usual state of health until this weekend, when she had developed initially intermittent shortness of breath, which increased in frequency until she woke up this morning and had constant shortness of breath, which prompted her  to call EMS. She had additionally developed abdominal pain with nausea earlier this morning. She was reported to be hypoxic upon EMS arrival with O2 sat 68% on room air. She was placed on 10 liters nasal cannula and subsequently transported to [facility] ED for further evaluation.     She additionally reports subjective fever with measured temperature of T 99F on Friday prior to admission with chills. She denies chest pain, cough, congestion, sick contacts, recent changes in her medications, travel.     She has actually lost weight per her report. She denies any increasing lower extremity edema.      Of note, she does state that she had stopped her anticoagulation because she did not like how it had made her feel (she had been discharged on Xarelto in December). Interval history / Subjective: Worsening overall status with decreased urine output, still on BIPAP, still on pressors. Palliative consulted. Suspect patient is a poor candidate for dialysis. Assessment & Plan:     1. Acute hypoxic respiratory failure  - etiology unclear at this point, CXR is fairly benign, given history of pulmonary embolism and stopped Xarelto  - CXR 2/22 - no acute process  - check ABG  - check CTA chest  - BiPAP support prn  - 2/23: try to wean off the BIPAP today;      2. Hypotension  - ? Volume depletion, though no reason for such based upon her history, ? Sepsis, ? Heart failure - would suspect RV failure  - received 2 liters normal saline in ED, BPs after bolus still 93X systolic  - needs central access - spoke with Dr. Marsha Reyes - patient is agreeable to central line, may require vasopressor support  - influenza negative  - blood cultures 2/22 - pending  - urinalysis 2/22 - 10-20 WBCs, 2+ bacteria, not entirely convincing for urinary source for infection  - empiric coverage with Zosyn, vancomycin for now, received levofloxacin in ED  - 2/23: continue pressors for now;      3. Elevated BNP  - uncertain significance, she has some elements in her history consistent with heart failure (ie - nocturnal dyspnea becoming more progressive), but appears volume deplete based upon examination, ? RV failure  - Echo 12/23/16 - EF 55-60%, noted RVSP 50 mm Hg, RV systolic function moderately reduced, moderate hypokinesis of the basal-mid free wall and basal-mid diaphragmatic wall  - repeat Echocardiogram  - give IV fluids for now  - hold home atenolol  - cardiology consulted     4. Lactic acidosis  - suspect this is secondary to volume contraction, but given hypotension as above, will treat empirically for sepsis of unknown etiology  - continue IV fluids      5. Elevated transaminases  - unclear etiology, ?  Hepatic congestion vs biliary disorder, pain does not really localize to RUQ on examination  - check CT Abd/Pelvis, if negative and persistently elevated, may need to pursue abdominal ultrasonography  - repeat CMP in AM     6. Abdominal pain  - wide differential could include cholecystitis vs mesenteric ischemia vs gastroenteritis, etc.  - lipase non-elevated  - check CT Abd/Pelvis  - pain control with acetaminophen, Norco, hydromorphone     7. History of pulmonary embolism  - diagnosed in 2015, she had been on Xarelto since her discharge 12/2016, but had stopped  - check CTA chest as above     8. HTN  - she is currently hypotensive, hold home atenolol     9. Hyponatremia  - suspect this is hypovolemic hyponatremia even with her elevated BNP, will trial fluid hydration     Code status: DNR  DVT prophylaxis: Heparin    Care Plan discussed with: Patient/Family, Nurse and Consultant Dr. Ben Pena, Palliative Medicine  Disposition: TBD     Grim prognosis with multiorgan failure. Hospital Problems  Date Reviewed: 2/22/2017          Codes Class Noted POA    Hypotension ICD-10-CM: I95.9  ICD-9-CM: 458.9  2/22/2017 Yes        * (Principal)Respiratory failure with hypoxia [facility]) ICD-10-CM: J96.91  ICD-9-CM: 518.81  2/22/2017 Yes        History of pulmonary embolism ICD-10-CM: R42.611  ICD-9-CM: V12.55  8/1/2016 Yes        Hypertension, essential, benign ICD-10-CM: I10  ICD-9-CM: 401.1  8/1/2016 Yes        Morbid obesity with body mass index of 40.0-49.9 (HCC) ICD-10-CM: E66.01  ICD-9-CM: 278.01  8/1/2016 Yes        RVF (right ventricular failure) ([facility] ICD-10-CM: I50.9  ICD-9-CM: 428.0  7/14/2015 Yes                Review of Systems:   Review of systems not obtained due to patient factors. Vital Signs:    Last 24hrs VS reviewed since prior progress note.  Most recent are:  Visit Vitals    BP (!) 115/100    Pulse 80    Temp 97.9 °F (36.6 °C)    Resp 21    Ht 5' 2\" (1.575 m)    Wt 98.2 kg (216 lb 7.9 oz)    SpO2 95%    BMI 39.6 kg/m2         Intake/Output Summary (Last 24 hours) at 02/23/17 1605  Last data filed at 02/23/17 1500   Gross per 24 hour   Intake          4480.78 ml   Output              274 ml   Net          4206.78 ml        Physical Examination:             Constitutional:  No acute distress, cooperative, pleasant    ENT:  Oral mucous moist, oropharynx benign. Neck supple,    Resp:  CTA bilaterally. No wheezing/rhonchi/rales. No accessory muscle use   CV:  Regular rhythm, normal rate, no murmurs, gallops, rubs    GI:  Soft, non distended, non tender. normoactive bowel sounds;    Musculoskeletal:  No edema, warm,;    Neurologic:  Moves all extremities. Answers questions appropriately     Skin:  warm and dry, but feet cold, and some distant mottling of the skin is noted;       Data Review:    Review and/or order of clinical lab test  Review and/or order of tests in the radiology section of CPT  Review and/or order of tests in the medicine section of CPT      Labs:     Recent Labs      02/23/17   0409  02/22/17   1202   WBC  15.1*  11.8*   HGB  14.1  16.7*   HCT  42.6  48.0*   PLT  269  273     Recent Labs      02/23/17   0409  02/22/17   1430  02/22/17   1327   NA  138  133*   --    K  4.5  4.6   --    CL  103  101   --    CO2  21  19*   --    BUN  19  21*   --    CREA  1.89*  0.90   --    GLU  148*  QUANTITY NOT SUFFICIENT. SUGGEST RECOLLECTION  115*   CA  7.9*  7.6*   --    MG  1.9   --    --    PHOS  5.8*   --    --      Recent Labs      02/23/17   0409  02/22/17   1430  02/22/17   1327   SGOT  155*  128*   --    ALT  60  47   --    AP  270*  264*   --    TBILI  0.7  0.8   --    TP  6.3*  6.1*   --    ALB  1.9*  1.5*   --    GLOB  4.4*  4.6*   --    LPSE   --   QUANTITY NOT SUFFICIENT. SUGGEST RECOLLECTION  49*     Recent Labs      02/22/17   1202   INR  1.2*   PTP  12.1*   APTT  25.1      No results for input(s): FE, TIBC, PSAT, FERR in the last 72 hours. No results found for: FOL, RBCF   No results for input(s): PH, PCO2, PO2 in the last 72 hours.   Recent Labs      02/22/17   1430   TROIQ  0.04     Lab Results   Component Value Date/Time    Cholesterol, total 78 10/26/2016 03:55 AM    HDL Cholesterol 19 10/26/2016 03:55 AM    LDL, calculated 40.6 10/26/2016 03:55 AM    Triglyceride 92 10/26/2016 03:55 AM    CHOL/HDL Ratio 4.1 10/26/2016 03:55 AM     Lab Results   Component Value Date/Time    Glucose (POC) 121 10/25/2016 09:22 AM     Lab Results   Component Value Date/Time    Color DARK YELLOW 02/22/2017 12:02 PM    Appearance CLOUDY 02/22/2017 12:02 PM    Specific gravity 1.020 02/22/2017 12:02 PM    pH (UA) 5.0 02/22/2017 12:02 PM    Protein NEGATIVE  02/22/2017 12:02 PM    Glucose NEGATIVE  02/22/2017 12:02 PM    Ketone NEGATIVE  02/22/2017 12:02 PM    Bilirubin NEGATIVE  12/22/2016 11:01 PM    Urobilinogen 1.0 02/22/2017 12:02 PM    Nitrites NEGATIVE  02/22/2017 12:02 PM    Leukocyte Esterase LARGE 02/22/2017 12:02 PM    Epithelial cells FEW 02/22/2017 12:02 PM    Bacteria 2+ 02/22/2017 12:02 PM    WBC 10-20 02/22/2017 12:02 PM    RBC 0-5 02/22/2017 12:02 PM         Medications Reviewed:     Current Facility-Administered Medications   Medication Dose Route Frequency    0.9% sodium chloride infusion  3 mL/hr IntraVENous CONTINUOUS    perflutren lipid microspheres (DEFINITY) in NS bolus IV  1.5 mL IntraVENous RAD ONCE    sodium chloride (NS) flush 20 mL  20 mL IntraVENous ONCE PRN    levothyroxine (SYNTHROID) injection 12.5 mcg  12.5 mcg IntraVENous Q24H    [START ON 2/24/2017] vancomycin random level with am labs on 2/24   Other ONCE    [START ON 2/24/2017] enoxaparin (LOVENOX) injection 100 mg  100 mg SubCUTAneous Q24H    sodium chloride (NS) flush 5-10 mL  5-10 mL IntraVENous Q8H    sodium chloride (NS) flush 5-10 mL  5-10 mL IntraVENous PRN    HYDROmorphone (PF) (DILAUDID) injection 0.5 mg  0.5 mg IntraVENous Q4H PRN    acetaminophen (TYLENOL) tablet 650 mg  650 mg Oral Q4H PRN    HYDROcodone-acetaminophen (NORCO) 5-325 mg per tablet 1 Tab  1 Tab Oral Q4H PRN    ondansetron (ZOFRAN) injection 4 mg  4 mg IntraVENous Q6H PRN    PHENYLephrine (NEOSYNEPHRINE) 30,000 mcg in 0.9% sodium chloride 250 mL infusion   mcg/min IntraVENous TITRATE    0.9% sodium chloride infusion  100 mL/hr IntraVENous CONTINUOUS    piperacillin-tazobactam (ZOSYN) 3.375 g in 0.9% sodium chloride (MBP/ADV) 100 mL  3.375 g IntraVENous Q8H    pantoprazole (PROTONIX) 40 mg in sodium chloride 0.9 % 10 mL injection  40 mg IntraVENous DAILY    Vancomycin - pharmacy to dose   Other Rx Dosing/Monitoring    NOREPINephrine (LEVOPHED) 8,000 mcg in dextrose 5% 250 mL infusion  2-16 mcg/min IntraVENous TITRATE     ______________________________________________________________________  EXPECTED LENGTH OF STAY: 4d 21h  ACTUAL LENGTH OF STAY:          1                 Malorie Lauren MD

## 2017-02-23 NOTE — PROGRESS NOTES
Pharmacist Note - Vancomycin Dosing    Consult provided for this [de-identified] y.o. female for indication of sepsis. Antibiotic regimen(s): Vanc + Zosyn    Recent Labs      17   1430  17   1202   WBC   --   11.8*   CREA  0.90   --    BUN  21*   --      Frequency of BMP: tomorrow AM x 1  Height: 157.5 cm  Weight: 96.2 kg  Est CrCl: 53.9 ml/min  Temp (24hrs), Av.4 °F (36.9 °C), Min:97 °F (36.1 °C), Max:99.9 °F (37.7 °C)    Cultures:   blood - in process    Goal trough = 15 - 20 mcg/mL    Therapy will be initiated with a loading dose of 2000 mg IV x 1 to be followed by a maintenance dose of 1500 mg IV every 24 hours. Pharmacy to follow patient daily and order levels / make dose adjustments as appropriate.

## 2017-02-23 NOTE — PROGRESS NOTES
Attended IVCU interdisciplinary rounds at [address] [place] [address] [address] [facility] ([id number])

## 2017-02-23 NOTE — PROGRESS NOTES
Spiritual Care Assessment/Progress Notes    Harsha Queen [id number]  [id number]    1935  [de-identified] y.o.  female    Patient Telephone Number: [phone] (home)   Cheondoism Affiliation: Oriental orthodox   Language: English   Extended Emergency Contact Information  Primary Emergency Contact: Julieta Ochoa, [address] Phone: [phone]  Relation: Spouse  Secondary Emergency Contact: HOLDENBERNARDOShashank Zacharyshawnbernardo Mi  Mobile Phone: [phone]  Relation: Child   Patient Active Problem List    Diagnosis Date Noted    Hypotension 02/22/2017    Respiratory failure with hypoxia ([facility] 02/22/2017    Bacterial pneumonia 10/24/2016    History of pulmonary embolism 08/01/2016    Hypertension, essential, benign 08/01/2016    Morbid obesity with body mass index of 40.0-49.9 ([facility] 08/01/2016    Edema 07/14/2015    RVF (right ventricular failure) ([facility] 07/14/2015        Date: 2/23/2017       Level of Cheondoism/Spiritual Activity:  []         Involved in jana tradition/spiritual practice    []         Not involved in jana tradition/spiritual practice  []         Spiritually oriented    []         Claims no spiritual orientation    []         seeking spiritual identity  []         Feels alienated from Alevism practice/tradition  []         Feels angry about Alevism practice/tradition  [x]         Spirituality/Alevism tradition is a resource for coping at this time.   []         Not able to assess due to medical condition    Services Provided Today:  []         crisis intervention    []         reading Scriptures  [x]         spiritual assessment    []         prayer  [x]         empathic listening/emotional support  []         rites and rituals (cite in comments)  []         life review     []         Alevism support  []         theological development   []         advocacy  []         ethical dialog     []         blessing  []         bereavement support    [] support to family  []         anticipatory grief support   []         help with AMD  []         spiritual guidance    []         meditation      Spiritual Care Needs  []         Emotional Support  []         Spiritual/Jainism Care  []         Loss/Adjustment  []         Advocacy/Referral                /Ethics  [x]         No needs expressed at               this time  []         Other: (note in               comments)  [address]  []         Follow up visits with               pt/family  []         Provide materials  []         Schedule sacraments  []         Contact Community               Clergy  [x]         Follow up as needed  []         Other: (note in               comments)     Initial spiritual assessment in CVICU. Jian Muhammad was just finishing anointing visit when arrived in patient's room.  Tushar Connelly and patient welcomed visit. Ehsan Rodriguez appeared quite tired---had briefly visited with couple 24 hours ago in ED. They moved to [place] 17 years ago to be closer to their daughter. Also have 3 sons in [facility]. Tushar Godwin shared that they were seeking to determine a course of treatment---and that Ehsan Rodriguez had ruled out dialysis. Ehsan Rodriguez stated that she was tired and unable to rest, given the high activity level. Assisted Jennifer in creating a restful space by dialing in the CARE channel on the TV and drawing the curtain to minimize distractions. Will revisit as needed. [facility]'s Staff  (Osei Silva Patient Care Specialist)   Paging Service [phone])

## 2017-02-23 NOTE — CONSULTS
Cardiology Consult Note      Patient Name: Silvia Arellano  : 1935 MRN: [id number]  Date: 2017  Time: 8:23 AM    Admit Diagnosis: Respiratory failure with hypoxia [facility])    Primary Cardiologist: Dr. Thu Sanabria Cardiologist: Galileo Markham. Shawn Vergara M.D.    Nanette Rodney for Consult: Heart failure    Requesting MD:  Luis Nicole MD    HPI:  Silvia Arellano is a [de-identified] y.o. female admitted on 2017  for Respiratory failure with hypoxia ([facility] Past medical history of  cataract, bilateral; Arthritis; Chronic pain; Hypertension; Hypertension, essential, benign (2016); Pulmonary emboli ([facility] Pulmonary embolism ([facility] (2015); Pulmonary stenosis; and RVF (right ventricular failure) ([facility] (2015). She presented to the ED with c/o worsening SOB occurring over the prior 5 days. Began intermittently, then progressed to constant, this prompted her to come to the ED via EMS. Associated abdominal pain with nausea the morning of admission. By patient report, she has stopped her anticoagulation because of how it made her feel. She was discharged in 2016 on Xarelto for PTE.  H/o prior PTE with RVF and Cor Pulmonale. CTA in ED confirms B/L PTE    Subjective:  Patient received in CVICU bed 38 with BIPAP on. Arouses. Feels SOB better with BIPAP. Denies CP. Assessment and Plan     Assessment/Plan/Discussion:Cardiology Attending:     Patient seen earlier today and examined  and agree with Advance Practice Provider (STAN, NP,PA)  assessment and plans. Silvia Arellano is a [de-identified] y.o. female   Echo pending  Has recurrent PE but had stopped her [address] on her own  Now in ICU with bipap  No cp, less sob, mild edema chronic at baseline  elev Cr and suspect ATN and relative hypotension-holding HTN meds    Jean Carlos Herrera MD 2017           1. Acute hypoxic Respiratory failure   - PTE - patient stopped [address] on own   - Continue BIPAP  2. PTE   - acute b/l   - Lovenox 1 mg/kg   - Will need lifelong [address]  3. Hypotension   - ? RVF   - Volume depleted   - Pressor support - Syed stopped, Levophed 7 mics   - NS infusion    - ? Sepsis - UA with bacteria   - Paired BC pending  4. Elevated BNP   - Suspect from Cor Pulmonale related to PTE   - Patient volume depleted   - Echo complete, results pending. 5. HARRIET   - elevated Cr - related to dehydration   - Continue NS infusion. 6. Hypothyroid   - Synthroid changed to IV route   - recheck TSH  7. HTN   - Hypotensive    - Hold all HTN meds    Acute PTE. Patient with h/o PTE and suppposed to be on life long [address]. She stopped Xarelto on her own because of how it made her feel. Now with picture of sepsis, ? Source. UA +, but suspect this is not the true source. ? Ischemic bowel 2/2 thrombus. Continue fluids, pressors. Suspect right heart strain from PTE. Echo complete. Will follow up results and follow with you. TTE 8/15 - LVEF 55 % to 60 %, no WMA, RV size and function normal, MAC, aortic valve sclerosis, mild TR  TTE 3/15 LVEF 55 %. No WMA. RV mildly to moderately dilated, Systolic function mildly to moderately reduced. AV mod increased thickness. Review of Symptoms:  Pertinent items are noted in HPI.     Previous treatment/evaluation includes echocardiogram .  Cardiac risk factors: obesity, sedentary life style, hypertension, post-menopausal.    Past Medical History:   Diagnosis Date    After cataract, bilateral     Arthritis     Chronic pain     back pain, left leg pain    Hypertension     Hypertension, essential, benign 8/1/2016    Pulmonary emboli (HCC)     Pulmonary embolism ([address] 7/14/2015    Pulmonary stenosis     RVF (right ventricular failure) ([address] 7/14/2015     Past Surgical History:   Procedure Laterality Date    ABDOMEN SURGERY PROC UNLISTED      cholecystectomy    HX GI      HX ORTHOPAEDIC      bilateral knee replacements     Current Facility-Administered Medications Medication Dose Route Frequency    0.9% sodium chloride infusion  3 mL/hr IntraVENous CONTINUOUS    perflutren lipid microspheres (DEFINITY) in NS bolus IV  1.5 mL IntraVENous RAD ONCE    sodium chloride (NS) flush 20 mL  20 mL IntraVENous ONCE PRN    levothyroxine (SYNTHROID) injection 25 mcg  25 mcg IntraVENous Q24H    sodium chloride (NS) flush 5-10 mL  5-10 mL IntraVENous Q8H    sodium chloride (NS) flush 5-10 mL  5-10 mL IntraVENous PRN    HYDROmorphone (PF) (DILAUDID) injection 0.5 mg  0.5 mg IntraVENous Q4H PRN    acetaminophen (TYLENOL) tablet 650 mg  650 mg Oral Q4H PRN    HYDROcodone-acetaminophen (NORCO) 5-325 mg per tablet 1 Tab  1 Tab Oral Q4H PRN    ondansetron (ZOFRAN) injection 4 mg  4 mg IntraVENous Q6H PRN    PHENYLephrine (NEOSYNEPHRINE) 30,000 mcg in 0.9% sodium chloride 250 mL infusion   mcg/min IntraVENous TITRATE    0.9% sodium chloride infusion  100 mL/hr IntraVENous CONTINUOUS    piperacillin-tazobactam (ZOSYN) 3.375 g in 0.9% sodium chloride (MBP/ADV) 100 mL  3.375 g IntraVENous Q8H    pantoprazole (PROTONIX) 40 mg in sodium chloride 0.9 % 10 mL injection  40 mg IntraVENous DAILY    enoxaparin (LOVENOX) injection 100 mg  1 mg/kg SubCUTAneous Q12H    Vancomycin - pharmacy to dose   Other Rx Dosing/Monitoring    NOREPINephrine (LEVOPHED) 8,000 mcg in dextrose 5% 250 mL infusion  2-16 mcg/min IntraVENous TITRATE    vancomycin (VANCOCIN) 1500 mg in  ml infusion  1,500 mg IntraVENous Q24H       No Known Allergies   History reviewed. No pertinent family history.    Social History     Social History    Marital status:      Spouse name: N/A    Number of children: N/A    Years of education: N/A     Social History Main Topics    Smoking status: Former Smoker    Smokeless tobacco: Never Used    Alcohol use No    Drug use: No    Sexual activity: Not Asked     Other Topics Concern    None     Social History Narrative       Objective:    Physical Exam    Vitals:   Vitals:    02/23/17 0600 02/23/17 0630 02/23/17 0700 02/23/17 0800   BP: 102/71 96/69 103/66 97/69   Pulse: 80 81 79 78   Resp: 22 25 15 20   Temp:    97.6 °F (36.4 °C)   SpO2: 99% 95% 99% 98%   Weight:       Height:           General:    Alert, cooperative, mild distress, appears stated age. Neck:   Supple, no carotid bruit and no JVD. Back:     Not assessed, patient on BIPAP. Lungs:     Coarse BS to auscultation bilaterally, anterolaterally   Heart[de-identified]    Regular rate and rhythm, S1, S2 normal, no murmur, click, rub or gallop. Abdomen:     Soft, non-tender. Bowel sounds hypoactive. .   Extremities:   Extremities normal, atraumatic, no cyanosis or edema. Vascular:   Pulses - 2+ radials   Skin:   Skin color normal. No rashes or lesions   Neurologic:   CN II-XII grossly intact. Telemetry: normal sinus rhythm    ECG: normal EKG, normal sinus rhythm    Data Review:     Radiology: PTE on CTA, abd CT cannot r/o ischemia or acute process. Recent Labs      02/22/17   1430   TROIQ  0.04     Recent Labs      02/23/17   0409  02/22/17   1430   NA  138  133*   K  4.5  4.6   CL  103  101   CO2  21  19*   BUN  19  21*   CREA  1.89*  0.90   GLU  148*  QUANTITY NOT SUFFICIENT. SUGGEST RECOLLECTION   PHOS  5.8*   --    CA  7.9*  7.6*     Recent Labs      02/23/17   0409  02/22/17   1202   WBC  15.1*  11.8*   HGB  14.1  16.7*   HCT  42.6  48.0*   PLT  269  273     Recent Labs      02/23/17   0409  02/22/17   1430  02/22/17   1202   PTP   --    --   12.1*   INR   --    --   1.2*   SGOT  155*  128*   --    AP  270*  264*   --      No results for input(s): TGL, CHOL, LDLC in the last 72 hours. No lab exists for component: HDLC,  HBA1C  No results for input(s): CRP, TSH, TSHEXT in the last 72 hours. No lab exists for component: ESR    Thank you very much for this referral. I appreciate the opportunity to participate in this patient's care. I will follow along with above stated plan.     Mayra Rivero Manisha Carranza PA-C         [facility] [address] [place] [address] [address] [address]     [place], [facility]     [phone]    Hawa Quintero MD

## 2017-02-23 NOTE — PROGRESS NOTES
Lovenox Daily Monitoring  Indication: PE  Recent Labs      02/23/17   0409  02/22/17   1430  02/22/17   1202   HGB  14.1   --   16.7*   PLT  269   --   273   INR   --    --   1.2*   CREA  1.89*  0.90   --      Date of last CBC: 2/23  Current Weight: 98.2 kg  Est. CrCl = 26 ml/min  Current Dose: 100 mg subcutaneously every 12 hours. Plan:   Changed dose to 100 mg subcutaneously every 24 hours for crcl < 30 per protocol.

## 2017-02-23 NOTE — PROGRESS NOTES
Day #2 of Vancomycin  Indication:  Sepsis  -- HARRIET   Current regimen:  1500 mg IV q 24 hours  Abx regimen:  Vanc + Zosyn  Concomitant nephrotoxic drugs: Contrast agents   Frequency of BMP?: x1 tomorrow    Recent Labs      17   0409  17   1430  17   1202   WBC  15.1*   --   11.8*   CREA  1.89*  0.90   --    BUN  19  21*   --      Est CrCl: 26 ml/min; UO: 0.1 ml/kg/hr  Temp (24hrs), Av °F (36.7 °C), Min:97 °F (36.1 °C), Max:99.9 °F (37.7 °C)    Cultures:    blood - NGTD    Goal trough = 15 - 20 mcg/mL    Recent trough history:  none    Plan: Hold current dose for significant change in Scr. Calculations now predict a q 36 hour dosing interval.  Will check a level prior to ordering the next dose.   Random level ordered with am labs on

## 2017-02-23 NOTE — PROGRESS NOTES
1345: turned levophed to 16 mcg.  Removed Bipap and giving her a trial of NC at [facility]

## 2017-02-24 NOTE — PROGRESS NOTES
Pharmacist Note - Vancomycin Dosing  Therapy day 3  Indication:sepsis  Current regimen: vancomycin 2 grams @ 1700 2/22    A Random Level resulted at 12.7 mcg/mL which was obtained 34 hrs post-dose. Goal trough: 15 - 20 mcg/mL     Plan: Will  order Vancomycin 1500 mg today, then 1250 mg  every 24 hr  will continue to monitor this patient daily for changes in clinical status and renal function.

## 2017-02-24 NOTE — PROGRESS NOTES
0800: Bedside and Verbal shift change report given to Shayy Leblanc RN (oncoming nurse) by Hannah Coronado RN (offgoing nurse). Report included the following information SBAR, Kardex, Intake/Output, MAR, Accordion, Recent Results and Med Rec Status. 1200: Telephone conversation with pt's daughter Wily Gautam. Daughter expressing concern about mother's plan of care. Conferred with Dr. Scar Retana regarding possible family meeting and hospice consult in future. 1700: Discussion with Dr. Ruma Barnes and family regarding possible hospice consultation. 2000: Bedside and Verbal shift change report given to Amrik Candelario RN (oncoming nurse) by Shayy Leblanc RN (offgoing nurse). Report included the following information SBAR, Intake/Output, MAR, Accordion and Recent Results.

## 2017-02-24 NOTE — PROGRESS NOTES
Nephrology Progress Note  Vinod Julien  Date of Admission : 2/22/2017    CC: Follow up for HARRIET       Assessment and Plan     Acute Kidney Injury:  - likely from ATN due to hypotension/? Sepsis + contrast vs volume depletion   - Renal U/S neg for hydro  - cont IVF  - pressors to maintain MAP > 65  - no need for RRT - pt does not want this even if it were temporary     Hypotension:  - ? Hemodynamic compromise from PE vs  Urosepsis  - cont pressors  - on lovenox and abx  - Urine cx + for e. Coli and klebsiella  - RV with marked reduction in function + mod to severe pulm HTN     Urosepsis:  - E. Coli and klebsiella in urine cx  - on abx and pressors    Resp Failure:  - 2/2 multiple PEs  - AC as above  - now off bipap    Lytes:  - K and bicarb stable for now     Multiple PEs:  - on lovenox     Hypothyroidism       Interval History:  Seen and examined. Off bipap and comfortable. Was off pressors temporarily last night, back on levo this AM.   No cp reported. Still oliguric. Cr better. No cp, sob, n/v/d reported. Current Medications: all current  Medications have been eviewed in EPIC  Review of Systems: Pertinent items are noted in HPI.     Objective:  Vitals:    Vitals:    02/24/17 0630 02/24/17 0645 02/24/17 0700 02/24/17 0703   BP: 103/63      Pulse: 70 80 82    Resp: 18  21    Temp:       SpO2:   98%    Weight:    100.4 kg (221 lb 5.5 oz)   Height:         Intake and Output:     02/22 1901 - 02/24 0700  In: 7038.5 [I.V.:7038.5]  Out: 945 [Urine:534]    Physical Examination:  Pt intubated     No  General: NAD,Conversant   Neck:  Supple, no mass, no JVD  Resp:  Lungs mostly clear b/l  CV:  RRR,  no murmur or rub, no LE edema  GI:  Soft, NT, + Bowel sounds, no hepatosplenomegaly  Neurologic:  Non focal  Psych:             AAO x 3 appropriate affect   Skin:  No Rash  :  Ricketts in place    []    High complexity decision making was performed  []    Patient is at high-risk of decompensation with multiple organ involvement    Lab Data Personally Reviewed: I have reviewed all the pertinent labs, microbiology data and radiology studies during assessment. Recent Labs      02/24/17 0317 02/23/17   0409  02/22/17   1430   02/22/17   1202   NA  140  138  133*   --    --    K  3.8  4.5  4.6   --    --    CL  107  103  101   --    --    CO2  20*  21  19*   --    --    GLU  103*  148*  QUANTITY NOT SUFFICIENT. SUGGEST RECOLLECTION   < >   --    BUN  19  19  21*   --    --    CREA  1.40*  1.89*  0.90   --    --    CA  7.8*  7.9*  7.6*   --    --    MG  1.7  1.9   --    --    --    PHOS  4.1  5.8*   --    --    --    ALB  1.7*  1.9*  1.5*   --    --    SGOT  171*  155*  128*   --    --    ALT  71  60  47   --    --    INR  1.4*   --    --    --   1.2*    < > = values in this interval not displayed.      Recent Labs      02/24/17 0317 02/23/17   0409  02/22/17   1202   WBC  11.9*  15.1*  11.8*   HGB  12.0  14.1  16.7*   HCT  36.3  42.6  48.0*   PLT  228  269  273     No results found for: [facility]  Lab Results   Component Value Date/Time    Culture result: NO GROWTH 2 DAYS 02/22/2017 12:13 PM    Culture result: ESCHERICHIA COLI 12/22/2016 11:01 PM    Culture result: KLEBSIELLA PNEUMONIAE 12/22/2016 11:01 PM     Recent Results (from the past 24 hour(s))   VANCOMYCIN, RANDOM    Collection Time: 02/24/17  3:17 AM   Result Value Ref Range    VANCOMYCIN,RANDOM 12.7 UG/ML   MAGNESIUM    Collection Time: 02/24/17  3:17 AM   Result Value Ref Range    Magnesium 1.7 1.6 - 2.4 mg/dL   PHOSPHORUS    Collection Time: 02/24/17  3:17 AM   Result Value Ref Range    Phosphorus 4.1 2.6 - 4.7 MG/DL   CBC WITH AUTOMATED DIFF    Collection Time: 02/24/17  3:17 AM   Result Value Ref Range    WBC 11.9 (H) 3.6 - 11.0 K/uL    RBC 4.09 3.80 - 5.20 M/uL    HGB 12.0 11.5 - 16.0 g/dL    HCT 36.3 35.0 - 47.0 %    MCV 88.8 80.0 - 99.0 FL    MCH 29.3 26.0 - 34.0 PG    MCHC 33.1 30.0 - 36.5 g/dL    RDW 19.8 (H) 11.5 - 14.5 %    PLATELET 569 253 - 584 K/uL NEUTROPHILS 70 32 - 75 %    LYMPHOCYTES 20 12 - 49 %    MONOCYTES 10 5 - 13 %    EOSINOPHILS 0 0 - 7 %    BASOPHILS 0 0 - 1 %    ABS. NEUTROPHILS 8.2 (H) 1.8 - 8.0 K/UL    ABS. LYMPHOCYTES 2.4 0.8 - 3.5 K/UL    ABS. MONOCYTES 1.2 (H) 0.0 - 1.0 K/UL    ABS. EOSINOPHILS 0.1 0.0 - 0.4 K/UL    ABS. BASOPHILS 0.0 0.0 - 0.1 K/UL   METABOLIC PANEL, COMPREHENSIVE    Collection Time: 02/24/17  3:17 AM   Result Value Ref Range    Sodium 140 136 - 145 mmol/L    Potassium 3.8 3.5 - 5.1 mmol/L    Chloride 107 97 - 108 mmol/L    CO2 20 (L) 21 - 32 mmol/L    Anion gap 13 5 - 15 mmol/L    Glucose 103 (H) 65 - 100 mg/dL    BUN 19 6 - 20 MG/DL    Creatinine 1.40 (H) 0.55 - 1.02 MG/DL    BUN/Creatinine ratio 14 12 - 20      GFR est AA 44 (L) >60 ml/min/1.73m2    GFR est non-AA 36 (L) >60 ml/min/1.73m2    Calcium 7.8 (L) 8.5 - 10.1 MG/DL    Bilirubin, total 0.6 0.2 - 1.0 MG/DL    ALT (SGPT) 71 12 - 78 U/L    AST (SGOT) 171 (H) 15 - 37 U/L    Alk. phosphatase 235 (H) 45 - 117 U/L    Protein, total 5.5 (L) 6.4 - 8.2 g/dL    Albumin 1.7 (L) 3.5 - 5.0 g/dL    Globulin 3.8 2.0 - 4.0 g/dL    A-G Ratio 0.4 (L) 1.1 - 2.2     PROTHROMBIN TIME + INR    Collection Time: 02/24/17  3:17 AM   Result Value Ref Range    INR 1.4 (H) 0.9 - 1.1      Prothrombin time 14.7 (H) 9.0 - 11.1 sec   NUCLEATED RBC    Collection Time: 02/24/17  3:17 AM   Result Value Ref Range    NRBC 0.3 (H) 0  WBC    ABSOLUTE NRBC 0.03 (H) 0.00 - 0.01 K/uL    WBC CORRECTED FOR NR ADJUSTED FOR NUCLEATED RBC'S                 Jaspal Dunn MD  [facility]   [address] [facility] Joanne Emanuel [address] [place] [address]  Phone - [phone]   Fax - [phone]  [email or web address]

## 2017-02-24 NOTE — PROGRESS NOTES
Lovenox Daily Monitoring  Indication: PE  Recent Labs      02/24/17   0317  02/23/17   0409  02/22/17   1430  02/22/17   1202   HGB  12.0  14.1   --   16.7*   PLT  228  269   --   273   INR  1.4*   --    --   1.2*   CREA  1.40*  1.89*  0.90   --      Date of last CBC: 2/24  Current Weight: 100.4 kg  Est. CrCl = ~ 35  ml/min  Current Dose: 100 mg subcutaneously every 24 hours. Plan:   Changed dose to 100 mg subcutaneously every 12 hours for crcl > 30 per protocol.

## 2017-02-24 NOTE — PROGRESS NOTES
Bedside and Verbal shift change report given to Kimberly RN and Ever RN (oncoming nurse) by Murleen Apgar (offgoing nurse). Report included the following information SBAR, Kardex, MAR, Recent Results and Cardiac Rhythm NSR.

## 2017-02-24 NOTE — PROGRESS NOTES
NUTRITION COMPLETE ASSESSMENT    RECOMMENDATIONS:   1. Diet advancement to full liquids + Ensure Enlive TID -> 2gm Na per MD  2. At risk for refeeding syndrome   - close monitoring of electrolytes with repletion PRN   - addition of thiamine, 100mg/day x 10 days  3. Continue daily weights     Interventions/Plan:   Food/Nutrient Delivery:    Commercial supplement (Ensure Clear TID)          Assessment:   Reason for Assessment: [x]BPA/MST Referral (24-33# wt loss x 3 months)    Diet: Clear liquids  Supplements: none  Nutritionally Significant Medications: [x] Reviewed & Includes: synthroid, protonix, zosyn, vanco, NS @ 100ml/hr, levophed; PRN: zofran q6hr  Meal Intake:   Patient Vitals for the past 100 hrs:   % Diet Eaten   02/22/17 1811 0 %     Pre-Hospitalization:  Usual Appetite: Poor  Diet at Home: regular  Vitamins/Supplements: No    Current Hospitalization:   Appetite: Poor  PO Ability: Independent Average po intake: (100% clear liquids today)  Average supplements intake:        Subjective:  \"I haven't been eating well for more than a week. I did good with the liquids today though. I had the broth, the cranberry juice and the Ian ice. \"    Objective:  Pt admitted for respiratory failure. PMHx: chronic pain, HTN, RV failure, morbid obesity. SOB x 5 days with multiple PE's. Levophed  rx. HARRIET, pt declining dialysis even if indicated. Poor prognosis noted. Abd pain and nausea PTA. Low PO intake PTA d/t poor appetite. Severe wt loss of 14% x ~7 month noted. 3+ BLLE likely masking even further loss of lean body mass. Wt Readings from Last 6 Encounters:   02/24/17 100.4 kg (221 lb 5.5 oz)   12/28/16 104.1 kg (229 lb 8 oz)   10/26/16 104.2 kg (229 lb 11.2 oz)   08/01/16 116.2 kg (256 lb 3.2 oz)   01/19/16 113.2 kg (249 lb 9.6 oz)   08/19/15 110.6 kg (243 lb 12.8 oz)   Pt at high risk for refeeding syndrome. Recommend close monitoring of electrolytes and addition of thiamine, 100mg/day x 10 days.      Pt and family member visited. Pt reports going days with out eating prior to admit, however did well with clear liquids today. Agreeable and interested in trying Ensure Clear TID (600kcal, 21g protein) and Ensure Enlive TID once diet advanced to full liquids. Will continue to follow for acceptance of supplements, diet advance/PO, and wt trends. Estimated Nutrition Needs:   Kcals/day: 1700 Kcals/day (1700-1845kcal)  Protein: 80 g (80-100g (0.8-1g/kg w/ HARRIET))  Fluid: 1560 ml (1ml/kcal)  Based On: [address] [place] (x 1.2-1.3)  Weight Used: Actual wt (100.4kg)    Pt expected to meet estimated nutrient needs:  []   Yes     [x]  No (while on clear liquids) [] Unable to predict at this time    Nutrition Diagnosis:   1.  Unintended weight loss related to inadequate oral intake as evidenced by severe wt loss of >14% x 7 months; poor PO PTA; dx progression    Goals:     Consumption of at least 25% of meals and 2-3 supplement/day in 5-7 days     Monitoring & Evaluation:    - Liquid meal replacement, Total energy intake   - Weight/weight change, Electrolyte and renal profile   -      Previous Nutrition Goals Met:   N/A  Previous Recommendations:    N/A    Education & Discharge Needs:   [x] None Identified   [] Identified and addressed    [] Participated in care plan, discharge planning, and/or interdisciplinary rounds        Cultural, Ep[employer]alian and ethnic food preferences identified: None    Skin Integrity: [x]Intact  []Other  Edema: []None [x]Other: 3+ BLE  Last BM: 2/22  Food Allergies: [x]None []Other  Diet Restrictions: Cultural/Advent Preference(s): None     Anthropometrics:    Weight Loss Metrics 2/24/2017 12/28/2016 10/26/2016 8/1/2016 1/19/2016 8/19/2015 7/14/2015   Today's Wt 221 lb 5.5 oz 229 lb 8 oz 229 lb 11.2 oz 256 lb 3.2 oz 249 lb 9.6 oz 243 lb 12.8 oz 248 lb 9.6 oz   BMI 40.48 kg/m2 41.98 kg/m2 42.01 kg/m2 46.85 kg/m2 45.64 kg/m2 44.58 kg/m2 45.46 kg/m2      Weight Source: Bed  Height: 5' 2\" (157.5 cm),    Body mass index is 40.48 kg/(m^2).   IBW : 49.9 kg (110 lb), % IBW (Calculated): 201.22 %  Usual Body Weight: 113.4 kg (250 lb),      Labs:    Lab Results   Component Value Date/Time    Sodium 140 02/24/2017 03:17 AM    Potassium 3.8 02/24/2017 03:17 AM    Chloride 107 02/24/2017 03:17 AM    CO2 20 02/24/2017 03:17 AM    Glucose 103 02/24/2017 03:17 AM    BUN 19 02/24/2017 03:17 AM    Creatinine 1.40 02/24/2017 03:17 AM    Calcium 7.8 02/24/2017 03:17 AM    Magnesium 1.7 02/24/2017 03:17 AM    Phosphorus 4.1 02/24/2017 03:17 AM    Albumin 1.7 02/24/2017 03:17 AM     Lab Results   Component Value Date/Time    Hemoglobin A1c 5.7 10/25/2016 05:20 AM     Beth Salas RD

## 2017-02-24 NOTE — PROGRESS NOTES
Problem: Falls - Risk of  Goal: *Absence of falls  Outcome: Progressing Towards Goal  High fall risk, patient verbalizes need for assistance to walk d/t fall 3 months ago, non skid socks on, call bell and belongings within reach     Problem: Pressure Ulcer - Risk of  Goal: *Prevention of pressure ulcer  Outcome: Progressing Towards Goal  Turning q2h, venelex to sacrum and heels, on TCS bed, need to advance diet        Problem: Sepsis: Day 2  Goal: *Hemodynamically stable  Outcome: Progressing Towards Goal  Titrated off vasopressors overnight, MAP >65  Goal: *Tolerating diet  Outcome: Progressing Towards Goal  Tolerating ice chips      Goal: Activity/Safety  Outcome: Progressing Towards Goal  Remains on bedrest

## 2017-02-24 NOTE — CONSULTS
Palliative Medicine Consult  Scar: [phone] ([phone])    Patient Name: Luis Salcido  YOB: 1935    Date of Initial Consult: 2/23/17  Reason for Consult: care decisions  Requesting Provider: Dr. Gisele Del Castillo   Primary Care Physician: Shameka Rocha MD      SUMMARY:   Luis Salcido is a [de-identified] y.o. with a past history of PE/DVT/IVC, RV failure, pulmonary htn, chronic pain, athritis, who was admitted on 2/22/2017 from home with a diagnosis of SOB, recently stopped a/c, bilateral PE, sepsis, HARRIET. Current medical issues leading to Palliative Medicine involvement include: multiple acute medical issues, care decisions. PALLIATIVE DIAGNOSES:   1. Anxiety / depression - longstanding  2. Shortness of breath - improved  3. Debility  4. Goals of care       PLAN:   1. At this time, pt denies pain, sob, nausea; continues with longstanding anxiety / depression; psychosocial support for pt and family; continue close assessment for distressing symptoms  2. Discussed with pt and her  about her condition, specifically update that kidney function seems a little better, although she is not out of woods yet, with continued low urine output  3. Pt able to remember our discussion from yesterday, again re-iterated her wish to not undergo dialysis; completed pink sheet indicating this and gave to RN  4. We talked about her quality of life recently, over last month PTA; she admits to losses of function / independence / things she used to enjoy like shopping; but she indicates she is still able to enjoy aspects of her life  5. We discussed her goals of care, currently set as limiting certain interventions, but actively treating acute conditions; she is OK with this  6.  I spoke with pt's dtr Lj Ferreira and updated her on renal function, continued need for pressors, still on abx; more hopeful that renal function will recover without dialysis but still totally sure; we discussed that death in hospital is possible  7. Dtr very concerned that pt has poor quality of life and that she is likely starting to have repeated cycles of admission / re-admission; we talked about hospice as a possible plan of care at some point in the future; I told dtr what pt had shared with me, as above  8. Will check in 2/27, possible fam mtg to see where we are / where we go from here  9. Initial consult note routed to primary continuity provider  10. Communicated plan of care with: Palliative IDT       GOALS OF CARE / TREATMENT PREFERENCES:   [====Goals of Care====]  GOALS OF CARE:  Patient / health care proxy stated goals: recover from current illness with current treatments, if possible; avoid aggressive care at end of life      TREATMENT PREFERENCES:   Code Status: DNR    Advance Care Planning:  Advance Care Planning 2/23/2017   Patient's Healthcare Decision Maker is: Named in scanned ACP document   Primary Decision Maker Name Ciaran Hartman   Primary Decision Maker Phone Number [phone]   Primary Decision Maker Relationship to Patient Adult child   Secondary Decision Maker Name Paul De La Paz   Secondary Decision Maker Relationship to Patient Spouse   Confirm Advance Directive Yes, on file   Does the patient have other document types Do Not Resuscitate       Other:    The palliative care team has discussed with patient / health care proxy about goals of care / treatment preferences for patient.  [====Goals of Care====]         HISTORY:     Reviewed vitals, I/O's, labs, imaging, MAR, notes. sbp 80's. On bipap some overnight, currently 6L nc. Off ruth, on NE gtt.   UOP still low, cr slightly improved (1.89 -> 1.4)  Albumin 1.7  MAR   Tylenol, no recent dose  lovenox 100mg q12h  Norco, no recent dose  Dilaudid 0.5mg IV PRN, 1 dose 2/23 2300  Synthroid PO  NE 6mcg/min, rate down since admission  Zofran, no recent dose  Ruth off  Zosyn, vancomycin        HPI/SUBJECTIVE:    The patient is:   [x] Verbal and participatory  [] Non-participatory due to:   Pt denies pain, sob, nausea. Feels a little more energy. Continues with long standing anxiety. Clinical Pain Assessment (nonverbal scale for severity on nonverbal patients):   [++++ Clinical Pain Assessment++++]  [++++Pain Severity++++]: Pain: 0  [++++Pain Character++++]:   [++++Pain Duration++++]:   [++++Pain Effect++++]:   [++++Pain Factors++++]:   [++++Pain Frequency++++]:   [++++Pain Location++++]:   [++++ Clinical Pain Assessment++++]     FUNCTIONAL ASSESSMENT:     Palliative Performance Scale (PPS):  PPS: 30       PSYCHOSOCIAL/SPIRITUAL SCREENING:     Advance Care Planning:  Advance Care Planning 2/23/2017   Patient's Healthcare Decision Maker is: Named in scanned ACP document   Primary Decision Maker Name Kierra Guo   Primary Decision Maker Phone Number [phone]   Primary Decision Maker Relationship to Patient Adult child   Secondary Decision Maker Name Alicja Joy   Secondary Decision Maker Relationship to Patient Spouse   Confirm Advance Directive Yes, on file   Does the patient have other document types Do Not Resuscitate        Any spiritual / Zoroastrian concerns:  [] Yes /  [x] No    Caregiver Burnout:  [] Yes /  [x] No /  [] No Caregiver Present      Anticipatory grief assessment:   [x] Normal  / [] Maladaptive       ESAS Anxiety: Anxiety: 7    ESAS Depression: Depression: 7        REVIEW OF SYSTEMS:     Positive and pertinent negative findings in ROS are noted above in HPI. The following systems were [x] reviewed / [] unable to be reviewed as noted in HPI  Other findings are noted below. Systems: constitutional, ears/nose/mouth/throat, respiratory, gastrointestinal, genitourinary, musculoskeletal, integumentary, neurologic, psychiatric, endocrine. Positive findings noted below.   Modified ESAS Completed by: provider   Fatigue: 9 Drowsiness: 1   Depression: 7 Pain: 0   Anxiety: 7 Nausea: 0   Anorexia: 0 Dyspnea: 0     Constipation: No     Stool Occurrence(s): 0        PHYSICAL EXAM:     From RN flowsheet:  Wt Readings from Last 3 Encounters:   02/24/17 221 lb 5.5 oz (100.4 kg)   12/28/16 229 lb 8 oz (104.1 kg)   10/26/16 229 lb 11.2 oz (104.2 kg)     Blood pressure 96/62, pulse 82, temperature 98.6 °F (37 °C), resp. rate 16, height 5' 2\" (1.575 m), weight 221 lb 5.5 oz (100.4 kg), SpO2 92 %. Pain Scale 1: Numeric (0 - 10)  Pain Intensity 1: 0  Pain Onset 1: \"lying in bed\"  Pain Location 1: Back  Pain Orientation 1: Lower  Pain Description 1: Aching  Pain Intervention(s) 1: Medication (see MAR)  Last bowel movement, if known:     Constitutional: aa, nad, fatigued  Eyes: pupils equal, anicteric  ENMT: no nasal discharge, dry mucous membranes  Cardiovascular: regular rhythm, distal pulses intact  Respiratory: breathing not labored, symmetric  Gastrointestinal: soft non-tender, +bowel sounds  Musculoskeletal: no deformity, no tenderness to palpation  Skin: warm, dry  Neurologic: following commands, moving all extremities  Psychiatric: full affect, no hallucinations  Other:       HISTORY:     Principal Problem:    Respiratory failure with hypoxia (HCC) (2/22/2017)    Active Problems:    RVF (right ventricular failure) ([address] (7/14/2015)      History of pulmonary embolism (8/1/2016)      Hypertension, essential, benign (8/1/2016)      Morbid obesity with body mass index of 40.0-49.9 (HCC) (8/1/2016)      Hypotension (2/22/2017)      Past Medical History:   Diagnosis Date    After cataract, bilateral     Arthritis     Chronic pain     back pain, left leg pain    Hypertension     Hypertension, essential, benign 8/1/2016    Pulmonary emboli (HCC)     Pulmonary embolism ([address] 7/14/2015    Pulmonary stenosis     RVF (right ventricular failure) ([address] 7/14/2015      Past Surgical History:   Procedure Laterality Date    ABDOMEN SURGERY PROC UNLISTED      cholecystectomy    HX GI      HX ORTHOPAEDIC      bilateral knee replacements      History reviewed.  No pertinent family history. History reviewed, no pertinent family history.   Social History   Substance Use Topics    Smoking status: Former Smoker    Smokeless tobacco: Never Used    Alcohol use No     No Known Allergies   Current Facility-Administered Medications   Medication Dose Route Frequency    enoxaparin (LOVENOX) injection 100 mg  100 mg SubCUTAneous Q12H    [START ON 2/25/2017] vancomycin (VANCOCIN) 1250 mg in  ml infusion  1,250 mg IntraVENous Q24H    [START ON 2/25/2017] levothyroxine (SYNTHROID) tablet 25 mcg  25 mcg Oral ACB    0.9% sodium chloride infusion  3 mL/hr IntraVENous CONTINUOUS    sodium chloride (NS) flush 20 mL  20 mL IntraVENous ONCE PRN    balsam peru-castor oil (VENELEX)  mg/gram ointment   Topical BID    sodium chloride (NS) flush 5-10 mL  5-10 mL IntraVENous Q8H    sodium chloride (NS) flush 5-10 mL  5-10 mL IntraVENous PRN    HYDROmorphone (PF) (DILAUDID) injection 0.5 mg  0.5 mg IntraVENous Q4H PRN    acetaminophen (TYLENOL) tablet 650 mg  650 mg Oral Q4H PRN    HYDROcodone-acetaminophen (NORCO) 5-325 mg per tablet 1 Tab  1 Tab Oral Q4H PRN    ondansetron (ZOFRAN) injection 4 mg  4 mg IntraVENous Q6H PRN    PHENYLephrine (NEOSYNEPHRINE) 30,000 mcg in 0.9% sodium chloride 250 mL infusion   mcg/min IntraVENous TITRATE    0.9% sodium chloride infusion  100 mL/hr IntraVENous CONTINUOUS    piperacillin-tazobactam (ZOSYN) 3.375 g in 0.9% sodium chloride (MBP/ADV) 100 mL  3.375 g IntraVENous Q8H    pantoprazole (PROTONIX) 40 mg in sodium chloride 0.9 % 10 mL injection  40 mg IntraVENous DAILY    Vancomycin - pharmacy to dose   Other Rx Dosing/Monitoring    NOREPINephrine (LEVOPHED) 8,000 mcg in dextrose 5% 250 mL infusion  2-16 mcg/min IntraVENous TITRATE          LAB AND IMAGING FINDINGS:     Lab Results   Component Value Date/Time    WBC 11.9 02/24/2017 03:17 AM    HGB 12.0 02/24/2017 03:17 AM    PLATELET 233 68/97/3545 03:17 AM     Lab Results   Component Value Date/Time    Sodium 140 02/24/2017 03:17 AM    Potassium 3.8 02/24/2017 03:17 AM    Chloride 107 02/24/2017 03:17 AM    CO2 20 02/24/2017 03:17 AM    BUN 19 02/24/2017 03:17 AM    Creatinine 1.40 02/24/2017 03:17 AM    Calcium 7.8 02/24/2017 03:17 AM    Magnesium 1.7 02/24/2017 03:17 AM    Phosphorus 4.1 02/24/2017 03:17 AM      Lab Results   Component Value Date/Time    AST (SGOT) 171 02/24/2017 03:17 AM    Alk. phosphatase 235 02/24/2017 03:17 AM    Protein, total 5.5 02/24/2017 03:17 AM    Albumin 1.7 02/24/2017 03:17 AM    Globulin 3.8 02/24/2017 03:17 AM     Lab Results   Component Value Date/Time    INR 1.4 02/24/2017 03:17 AM    Prothrombin time 14.7 02/24/2017 03:17 AM    aPTT 25.1 02/22/2017 12:02 PM      No results found for: IRON, FE, TIBC, IBCT, PSAT, FERR   No results found for: PH, PCO2, PO2  No components found for: [place]   Lab Results   Component Value Date/Time     12/22/2016 09:29 PM    CK - MB 2.7 12/22/2016 09:29 PM                Total time: 35min  Counseling / coordination time: 20min  > 50% counseling / coordination?: y    Prolonged service was provided for  []30 min   []75 min in face to face time in the presence of the patient. Time Start:   Time End:   Note: this can only be billed with 62621 (initial) or 78829 (follow up). If multiple start / stop times, list each separately.

## 2017-02-24 NOTE — PROGRESS NOTES
Cardiology Progress Note            Admit Date: 2/22/2017  Admit Diagnosis: Respiratory failure with hypoxia [facility])  Date: 2/24/2017     Time: 9:13 AM    HPI:  Darryl Santillan is a [de-identified] y.o. female admitted on 2/22/2017 for Respiratory failure with hypoxia ([facility] Past medical history of cataract, bilateral; Arthritis; Chronic pain; Hypertension; Hypertension, essential, benign (8/1/2016); Pulmonary emboli ([facility] Pulmonary embolism ([facility] (7/14/2015); Pulmonary stenosis; and RVF (right ventricular failure) ([facility] (7/14/2015). She presented to the ED with c/o worsening SOB occurring over the prior 5 days. Began intermittently, then progressed to constant, this prompted her to come to the ED via EMS. Associated abdominal pain with nausea the morning of admission. By patient report, she has stopped her anticoagulation because of how it made her feel. She was discharged in December 2016 on Xarelto for PTE.  H/o prior PTE with RVF and Cor Pulmonale. CTA in ED confirms B/L PTE     Subjective:  Patient received the morning off BIPAP. Feeling better. Denies CP or SOB.     Assessment and Plan     Assessment/Plan/Discussion:Cardiology Attending:     Patient seen earlier today and examined  and agree with Advance Practice Provider (STAN, NP,PA)  assessment and plans. Darryl Santillan is a [de-identified] y.o. female   Echo with findings of RV strain  Pt off Bipap and no reported pain or sob at rest  Cardiac exam no change from below  Will see prn over weekend    Ebony Ortiz MD 2/24/2017           1. Acute hypoxic Respiratory failure   - PTE - patient stopped [address] on own   - Continue BIPAP prn  2. PTE   - acute b/l   - Lovenox 1 mg/kg   - Will need lifelong [address]  3. Hypotension   - Right heart strain   - Volume depleted - improving   - Pressor support - Syed stopped, Levophed 7 mics   - NS infusion    - UA with bacteria - Urosepsis   - Paired BC negative so far  4. Elevated BNP   - Suspect from Cor Pulmonale related to PTE   - Patient volume depleted   - Echo 55-60%, dyskinesis of the mid anteroseptal wall(s). Ventricular septum: There was paradoxical motion. These changes are consistent with RV volume and pressure overload. Right ventricle: The ventricle was moderately to severely dilated. Systolic function was moderately to markedly reduced. Left atrium: The atrium was mildly dilated. Right atrium: The atrium was moderately dilated. Aortic valve: Leaflets exhibited sclerosis. Tricuspid valve: There was moderate to severe regurgitation. Pulmonary arteries: There was moderate to severe pulmonary artery                                                hypertension. rvsp around 65-70 mmhg  5. HARRIET   - elevated Cr - related to dehydration/ATN/sepsis   - Continue NS infusion.   - Cr 1.4  6. Hypothyroid   - Change synthroid to PO  7. HTN   - Hypotensive    - Hold all HTN meds     PTE 2/2 to stopping own meds. Echo shows right heart strain related to PTE. Diuretics on hold 2/2 HARRIET. Still on Levophed for hypotension. Will order PT/OT. ROS:  Pertinent items are noted in HPI. Objective:      Physical Exam:                Visit Vitals    /63    Pulse 82    Temp 98 °F (36.7 °C)    Resp 21    Ht 5' 2\" (1.575 m)    Wt 100.4 kg (221 lb 5.5 oz)    SpO2 96%    BMI 40.48 kg/m2          General Appearance:   Well developed, well nourished,alert and oriented x 3, and   individual in no acute distress. Ears/Nose/Mouth/Throat:    Hearing grossly normal.         Neck:  Supple. Chest:    Lungs coarse BS to auscultation bilaterally, anterolaterally   Cardiovascular:   Regular rate and rhythm, S1, S2 normal, no murmur. Abdomen:    Soft, non-tender, bowel sounds are active. Extremities:  No edema bilaterally. Skin:  Warm and dry.      Telemetry: normal sinus rhythm          Data Review:    Labs:    Recent Results (from the past 24 hour(s)) VANCOMYCIN, RANDOM    Collection Time: 02/24/17  3:17 AM   Result Value Ref Range    VANCOMYCIN,RANDOM 12.7 UG/ML   MAGNESIUM    Collection Time: 02/24/17  3:17 AM   Result Value Ref Range    Magnesium 1.7 1.6 - 2.4 mg/dL   PHOSPHORUS    Collection Time: 02/24/17  3:17 AM   Result Value Ref Range    Phosphorus 4.1 2.6 - 4.7 MG/DL   CBC WITH AUTOMATED DIFF    Collection Time: 02/24/17  3:17 AM   Result Value Ref Range    WBC 11.9 (H) 3.6 - 11.0 K/uL    RBC 4.09 3.80 - 5.20 M/uL    HGB 12.0 11.5 - 16.0 g/dL    HCT 36.3 35.0 - 47.0 %    MCV 88.8 80.0 - 99.0 FL    MCH 29.3 26.0 - 34.0 PG    MCHC 33.1 30.0 - 36.5 g/dL    RDW 19.8 (H) 11.5 - 14.5 %    PLATELET 282 004 - 169 K/uL    NEUTROPHILS 70 32 - 75 %    LYMPHOCYTES 20 12 - 49 %    MONOCYTES 10 5 - 13 %    EOSINOPHILS 0 0 - 7 %    BASOPHILS 0 0 - 1 %    ABS. NEUTROPHILS 8.2 (H) 1.8 - 8.0 K/UL    ABS. LYMPHOCYTES 2.4 0.8 - 3.5 K/UL    ABS. MONOCYTES 1.2 (H) 0.0 - 1.0 K/UL    ABS. EOSINOPHILS 0.1 0.0 - 0.4 K/UL    ABS. BASOPHILS 0.0 0.0 - 0.1 K/UL   METABOLIC PANEL, COMPREHENSIVE    Collection Time: 02/24/17  3:17 AM   Result Value Ref Range    Sodium 140 136 - 145 mmol/L    Potassium 3.8 3.5 - 5.1 mmol/L    Chloride 107 97 - 108 mmol/L    CO2 20 (L) 21 - 32 mmol/L    Anion gap 13 5 - 15 mmol/L    Glucose 103 (H) 65 - 100 mg/dL    BUN 19 6 - 20 MG/DL    Creatinine 1.40 (H) 0.55 - 1.02 MG/DL    BUN/Creatinine ratio 14 12 - 20      GFR est AA 44 (L) >60 ml/min/1.73m2    GFR est non-AA 36 (L) >60 ml/min/1.73m2    Calcium 7.8 (L) 8.5 - 10.1 MG/DL    Bilirubin, total 0.6 0.2 - 1.0 MG/DL    ALT (SGPT) 71 12 - 78 U/L    AST (SGOT) 171 (H) 15 - 37 U/L    Alk.  phosphatase 235 (H) 45 - 117 U/L    Protein, total 5.5 (L) 6.4 - 8.2 g/dL    Albumin 1.7 (L) 3.5 - 5.0 g/dL    Globulin 3.8 2.0 - 4.0 g/dL    A-G Ratio 0.4 (L) 1.1 - 2.2     PROTHROMBIN TIME + INR    Collection Time: 02/24/17  3:17 AM   Result Value Ref Range    INR 1.4 (H) 0.9 - 1.1      Prothrombin time 14.7 (H) 9.0 - 11.1 sec   NUCLEATED RBC    Collection Time: 02/24/17  3:17 AM   Result Value Ref Range    NRBC 0.3 (H) 0  WBC    ABSOLUTE NRBC 0.03 (H) 0.00 - 0.01 K/uL    WBC CORRECTED FOR NR ADJUSTED FOR NUCLEATED RBC'S            Radiology:        Current Facility-Administered Medications   Medication Dose Route Frequency    vancomycin (VANCOCIN) 1500 mg in  ml infusion  1,500 mg IntraVENous ONCE    enoxaparin (LOVENOX) injection 100 mg  100 mg SubCUTAneous Q12H    [START ON 2/25/2017] vancomycin (VANCOCIN) 1250 mg in  ml infusion  1,250 mg IntraVENous Q24H    0.9% sodium chloride infusion  3 mL/hr IntraVENous CONTINUOUS    sodium chloride (NS) flush 20 mL  20 mL IntraVENous ONCE PRN    levothyroxine (SYNTHROID) injection 12.5 mcg  12.5 mcg IntraVENous Q24H    balsam peru-castor oil (VENELEX)  mg/gram ointment   Topical BID    sodium chloride (NS) flush 5-10 mL  5-10 mL IntraVENous Q8H    sodium chloride (NS) flush 5-10 mL  5-10 mL IntraVENous PRN    HYDROmorphone (PF) (DILAUDID) injection 0.5 mg  0.5 mg IntraVENous Q4H PRN    acetaminophen (TYLENOL) tablet 650 mg  650 mg Oral Q4H PRN    HYDROcodone-acetaminophen (NORCO) 5-325 mg per tablet 1 Tab  1 Tab Oral Q4H PRN    ondansetron (ZOFRAN) injection 4 mg  4 mg IntraVENous Q6H PRN    PHENYLephrine (NEOSYNEPHRINE) 30,000 mcg in 0.9% sodium chloride 250 mL infusion   mcg/min IntraVENous TITRATE    0.9% sodium chloride infusion  100 mL/hr IntraVENous CONTINUOUS    piperacillin-tazobactam (ZOSYN) 3.375 g in 0.9% sodium chloride (MBP/ADV) 100 mL  3.375 g IntraVENous Q8H    pantoprazole (PROTONIX) 40 mg in sodium chloride 0.9 % 10 mL injection  40 mg IntraVENous DAILY    Vancomycin - pharmacy to dose   Other Rx Dosing/Monitoring    NOREPINephrine (LEVOPHED) 8,000 mcg in dextrose 5% 250 mL infusion  2-16 mcg/min IntraVENous TITRATE          Reuben Shukla PA-C     [facility] [address] [place] [address], [address] Ashley Galindo   [phone]

## 2017-02-24 NOTE — PROGRESS NOTES
Bedside shift change report given to Doctor Alba Maxwell (oncoming nurse) by Max Congress (offgoing nurse). Report included the following information SBAR, Kardex, Intake/Output, MAR and Recent Results.

## 2017-02-25 NOTE — HOSPICE
Hospice Liaison RN    Consult received. Reviewed with Dr. Rosy Monte. Family is present and will meet for informational visit shortly. Thank you for the opportunity to assist this patient and family.     Ella Gimenez RN, MSN  [facility]  [phone] ( [address] [place] [address] number)  [phone] (cell)

## 2017-02-25 NOTE — PROGRESS NOTES
Hospitalist Progress Note  Rita Jean MD  Office: [phone]  Cell: [phone]      Date of Service:  2017  NAME:  Nitish Sethi  :  1935  MRN:  [id number]      Admission Summary:   Nitish Sethi is a [de-identified] y.o. female with history of prior pulmonary embolism, pulmonary stenosis, RV failure, HTN, chronic pain, morbid obesity, prior left pleural effusion (?parapneumonic) who presents with shortness of breath x 5 days. She has had multiple recent admissions to [facility] in the past year, most recently seen -2016 for chest pain, where she was diagnosed with pneumonia. She had been discharged to home at that time and had reportedly been in her usual state of health until this weekend, when she had developed initially intermittent shortness of breath, which increased in frequency until she woke up this morning and had constant shortness of breath, which prompted her  to call EMS. She had additionally developed abdominal pain with nausea earlier this morning. She was reported to be hypoxic upon EMS arrival with O2 sat 68% on room air. She was placed on 10 liters nasal cannula and subsequently transported to [facility] ED for further evaluation.     She additionally reports subjective fever with measured temperature of T 99F on Friday prior to admission with chills. She denies chest pain, cough, congestion, sick contacts, recent changes in her medications, travel.     She has actually lost weight per her report. She denies any increasing lower extremity edema.      Of note, she does state that she had stopped her anticoagulation because she did not like how it had made her feel (she had been discharged on Xarelto in December). Interval history / Subjective:   No improvement in patient's condition. Daughter and son (from [place]) present at the bedside.   Daughter and son wish for a Hospice consult, but know that \"our dad will have a difficult time letting go\", Denson Lundborg thinks a miracle will save her\". Assessment & Plan:     1. Acute hypoxic respiratory failure  - etiology unclear at this point, CXR is fairly benign, given history of pulmonary embolism and stopped Xarelto  - CXR 2/22 - no acute process  - check ABG  - check CTA chest  - BiPAP support prn  - 2/23: try to wean off the BIPAP today;   - 2/24: off the BIPAP and on NC; no resp distress;     2. Hypotension  - ? Volume depletion, though no reason for such based upon her history, ? Sepsis, ? Heart failure - would suspect RV failure  - received 2 liters normal saline in ED, BPs after bolus still 05Z systolic  - needs central access - spoke with Dr. Ray Montelongo - patient is agreeable to central line, may require vasopressor support  - influenza negative  - blood cultures 2/22 - pending  - urinalysis 2/22 - 10-20 WBCs, 2+ bacteria, not entirely convincing for urinary source for infection  - empiric coverage with Zosyn, vancomycin for now, received levofloxacin in ED  - 2/23: continue pressors for now;   - 2/24: remains on pressors; still hypotensive; poor urine output.      3. Elevated BNP  - uncertain significance, she has some elements in her history consistent with heart failure (ie - nocturnal dyspnea becoming more progressive), but appears volume deplete based upon examination, ? RV failure  - Echo 12/23/16 - EF 55-60%, noted RVSP 50 mm Hg, RV systolic function moderately reduced, moderate hypokinesis of the basal-mid free wall and basal-mid diaphragmatic wall  - repeat Echocardiogram  - give IV fluids for now  - hold home atenolol  - cardiology consulted     4. Lactic acidosis  - suspect this is secondary to volume contraction, but given hypotension as above, will treat empirically for sepsis of unknown etiology  - continue IV fluids   - WBC count improving;  - blood culture: no growth x 2 days;     5. Elevated transaminases  - unclear etiology, ?  Hepatic congestion vs biliary disorder, pain does not really localize to RUQ on examination  - check CT Abd/Pelvis, if negative and persistently elevated, may need to pursue abdominal ultrasonography  - repeat CMP in AM     6. Abdominal pain  - wide differential could include cholecystitis vs mesenteric ischemia vs gastroenteritis, etc.  - lipase non-elevated  - pain control;  - CT abdo/pelvis: Hyperemia and straightening/narrowing of the left colon although without mural  thickening, nonspecific, possibly inflammatory, infectious or ischemic.     7. History of pulmonary embolism  - diagnosed in 2015, she had been on Xarelto since her discharge 12/2016, but had stopped  - CTA: bilateral large proximal pulmonary emboli;     8. HTN  - she is currently hypotensive, hold home atenolol     9. Hyponatremia  - suspect this is hypovolemic hyponatremia even with her elevated BNP, will trial fluid hydration     Code status: DNR  DVT prophylaxis: Heparin    Care Plan discussed with: Patient/Family and Nurse  Disposition: TBD     Grim prognosis with multiorgan failure. Consult Hospice per family's wishes. Patient will likely meet inpatient criteria. Hospital Problems  Date Reviewed: 2/22/2017          Codes Class Noted POA    Hypotension ICD-10-CM: I95.9  ICD-9-CM: 458.9  2/22/2017 Yes        * (Principal)Respiratory failure with hypoxia [facility]) ICD-10-CM: J96.91  ICD-9-CM: 518.81  2/22/2017 Yes        History of pulmonary embolism ICD-10-CM: W99.273  ICD-9-CM: V12.55  8/1/2016 Yes        Hypertension, essential, benign ICD-10-CM: I10  ICD-9-CM: 401.1  8/1/2016 Yes        Morbid obesity with body mass index of 40.0-49.9 (HCC) ICD-10-CM: E66.01  ICD-9-CM: 278.01  8/1/2016 Yes        RVF (right ventricular failure) ([facility] [address] ICD-10-CM: I50.9  ICD-9-CM: 428.0  7/14/2015 Yes                Review of Systems:   Review of systems not obtained due to patient factors. Vital Signs:    Last 24hrs VS reviewed since prior progress note.  Most recent are:  Visit Vitals  BP 90/55    Pulse 78    Temp 97.8 °F (36.6 °C)    Resp 21    Ht 5' 2\" (1.575 m)    Wt 100.4 kg (221 lb 5.5 oz)    SpO2 (!) 76%    BMI 40.48 kg/m2         Intake/Output Summary (Last 24 hours) at 02/24/17 2246  Last data filed at 02/24/17 2238   Gross per 24 hour   Intake          4060.51 ml   Output              329 ml   Net          3731.51 ml        Physical Examination:             Constitutional:  No acute distress, cooperative, pleasant    ENT:  Oral mucous moist, oropharynx benign. Neck supple,    Resp:  CTA bilaterally. No wheezing/rhonchi/rales. No accessory muscle use   CV:  Regular rhythm, normal rate, no murmurs, gallops, rubs    GI:  Soft, non distended, non tender. normoactive bowel sounds;    Musculoskeletal:  No edema, warm,;    Neurologic:  Moves all extremities. Answers questions appropriately     Skin:  warm and dry, but feet cold, and some distant mottling of the skin is noted;       Data Review:    Review and/or order of clinical lab test  Review and/or order of tests in the radiology section of CPT  Review and/or order of tests in the medicine section of CPT      Labs:     Recent Labs      02/24/17 0317 02/23/17   0409   WBC  11.9*  15.1*   HGB  12.0  14.1   HCT  36.3  42.6   PLT  228  269     Recent Labs      02/24/17 0317 02/23/17   0409  02/22/17   1430   NA  140  138  133*   K  3.8  4.5  4.6   CL  107  103  101   CO2  20*  21  19*   BUN  19  19  21*   CREA  1.40*  1.89*  0.90   GLU  103*  148*  QUANTITY NOT SUFFICIENT. SUGGEST RECOLLECTION   CA  7.8*  7.9*  7.6*   MG  1.7  1.9   --    PHOS  4.1  5.8*   --      Recent Labs      02/24/17 0317 02/23/17   0409  02/22/17   1430  02/22/17   1327   SGOT  171*  155*  128*   --    ALT  71  60  47   --    AP  235*  270*  264*   --    TBILI  0.6  0.7  0.8   --    TP  5.5*  6.3*  6.1*   --    ALB  1.7*  1.9*  1.5*   --    GLOB  3.8  4.4*  4.6*   --    LPSE   --    --   QUANTITY NOT SUFFICIENT.  SUGGEST RECOLLECTION  49*     Recent Labs      02/24/17   0317  02/22/17   1202   INR  1.4*  1.2*   PTP  14.7*  12.1*   APTT   --   25.1      No results for input(s): FE, TIBC, PSAT, FERR in the last 72 hours. No results found for: FOL, RBCF   No results for input(s): PH, PCO2, PO2 in the last 72 hours.   Recent Labs      02/22/17   1430   TROIQ  0.04     Lab Results   Component Value Date/Time    Cholesterol, total 78 10/26/2016 03:55 AM    HDL Cholesterol 19 10/26/2016 03:55 AM    LDL, calculated 40.6 10/26/2016 03:55 AM    Triglyceride 92 10/26/2016 03:55 AM    CHOL/HDL Ratio 4.1 10/26/2016 03:55 AM     Lab Results   Component Value Date/Time    Glucose (POC) 121 10/25/2016 09:22 AM     Lab Results   Component Value Date/Time    Color DARK YELLOW 02/22/2017 12:02 PM    Appearance CLOUDY 02/22/2017 12:02 PM    Specific gravity 1.020 02/22/2017 12:02 PM    pH (UA) 5.0 02/22/2017 12:02 PM    Protein NEGATIVE  02/22/2017 12:02 PM    Glucose NEGATIVE  02/22/2017 12:02 PM    Ketone NEGATIVE  02/22/2017 12:02 PM    Bilirubin NEGATIVE  12/22/2016 11:01 PM    Urobilinogen 1.0 02/22/2017 12:02 PM    Nitrites NEGATIVE  02/22/2017 12:02 PM    Leukocyte Esterase LARGE 02/22/2017 12:02 PM    Epithelial cells FEW 02/22/2017 12:02 PM    Bacteria 2+ 02/22/2017 12:02 PM    WBC 10-20 02/22/2017 12:02 PM    RBC 0-5 02/22/2017 12:02 PM         Medications Reviewed:     Current Facility-Administered Medications   Medication Dose Route Frequency    enoxaparin (LOVENOX) injection 100 mg  100 mg SubCUTAneous Q12H    [START ON 2/25/2017] vancomycin (VANCOCIN) 1250 mg in  ml infusion  1,250 mg IntraVENous Q24H    levothyroxine (SYNTHROID) tablet 25 mcg  25 mcg Oral ACB    0.9% sodium chloride infusion  3 mL/hr IntraVENous CONTINUOUS    sodium chloride (NS) flush 20 mL  20 mL IntraVENous ONCE PRN    balsam peru-castor oil (VENELEX)  mg/gram ointment   Topical BID    sodium chloride (NS) flush 5-10 mL  5-10 mL IntraVENous Q8H    sodium chloride (NS) flush 5-10 mL  5-10 mL IntraVENous PRN    HYDROmorphone (PF) (DILAUDID) injection 0.5 mg  0.5 mg IntraVENous Q4H PRN    acetaminophen (TYLENOL) tablet 650 mg  650 mg Oral Q4H PRN    HYDROcodone-acetaminophen (NORCO) 5-325 mg per tablet 1 Tab  1 Tab Oral Q4H PRN    ondansetron (ZOFRAN) injection 4 mg  4 mg IntraVENous Q6H PRN    PHENYLephrine (NEOSYNEPHRINE) 30,000 mcg in 0.9% sodium chloride 250 mL infusion   mcg/min IntraVENous TITRATE    0.9% sodium chloride infusion  100 mL/hr IntraVENous CONTINUOUS    piperacillin-tazobactam (ZOSYN) 3.375 g in 0.9% sodium chloride (MBP/ADV) 100 mL  3.375 g IntraVENous Q8H    pantoprazole (PROTONIX) 40 mg in sodium chloride 0.9 % 10 mL injection  40 mg IntraVENous DAILY    Vancomycin - pharmacy to dose   Other Rx Dosing/Monitoring    NOREPINephrine (LEVOPHED) 8,000 mcg in dextrose 5% 250 mL infusion  2-16 mcg/min IntraVENous TITRATE     ______________________________________________________________________  EXPECTED LENGTH OF STAY: 4d 21h  ACTUAL LENGTH OF STAY:          2                 Malorie Lauren MD

## 2017-02-25 NOTE — PROGRESS NOTES
0800 - Report received on patient. Assessment performed. Noted that she was asleep, mouth open, dusky pallor to face, awakens easily to verbal stimuli.  1715 - Dr. Malia Arceo in to see patient. Update given. Terry Carr, the daughter and POA, called for an update. Update given. [id number] - Dr. Malia Arceo paged and informed of labs. Order for Magnesium 2gm IV x 1 dose now. [address] from Hospice in to see patient and family.

## 2017-02-25 NOTE — PROGRESS NOTES
Problem: Patient Education: Go to Patient Education Activity  Goal: Patient/Family Education  Outcome: Progressing Towards Goal  Patient is aware of her BP being low and being supported by medication, some electrolytes in her body are low and being replaced, her kidneys are not working well, and she is short of breath because of her heart. Problem: Heart Failure: Discharge Outcomes  Goal: *Demonstrates ability to perform prescribed activity without shortness of breath or discomfort  Outcome: Not Progressing Towards Goal  Variance: Patient Condition        Problem: Falls - Risk of  Goal: *Absence of falls  Outcome: Progressing Towards Goal  No falls on shift. Patient does not really attempt to get out of bed on her own. Problem: Pressure Ulcer - Risk of  Goal: *Prevention of pressure ulcer  Outcome: Progressing Towards Goal  No pressure ulcers.

## 2017-02-25 NOTE — PROGRESS NOTES
2000: report received from [address] and Kimberly RN, extremities dusky with 3+ pitting edema, pt resting comfortably in bed  2125: pt had large loose brown stool, complete chlorhexidine bath given  0020: O2 sats 86-88% on 6 liters nasal cannula, pt placed on BIPAP 12/5 @ 60%. 0030: O2 sats increased 96-98%  0304: increased levophed to 10mcgs/min  0315:  Pt keeps having burst of increased HR - sinus tach vs aflutter in 120's  0518: spoke with Dr. Harley Ortiz regarding pts am labs- (K 3.2) orders to replace with 2 runs KCL 10 mEq and repeat BMP and Mag at 0900  0800:Bedside and Verbal shift change report given to Rylie Meadows RN (oncoming nurse) by Katie Cotto RN (offgoing nurse). Report included the following information SBAR, Kardex, Procedure Summary, Intake/Output, MAR, Accordion, Recent Results, Med Rec Status and Cardiac Rhythm sinus rhythm w/ 1AVB .

## 2017-02-25 NOTE — PROGRESS NOTES
Physical Therapy  Orders received and chart reviewed. Spoke with nursing and family. Family prefers to hold PT at this time. Nursing states will likely dc orders as patient likely going hospice. Will follow up as appropriate.   Diana Gorman, PT

## 2017-02-25 NOTE — PROGRESS NOTES
Nephrology Progress Note  Jones Griffin  Date of Admission : 2/22/2017    CC: Follow up for HARRIET       Assessment and Plan     Acute Kidney Injury:  - likely from ATN due to hypotension/? Sepsis + contrast vs volume depletion   - Renal U/S neg for hydro  - cont IVF - add bicarb to ivf    Hypomagnesemia  - iv mag sulfate    Metabolic acidosis - add bicarb to ivf     Urosepsis:  - E. Coli and klebsiella in urine cx  - on abx and pressors    Resp Failure:       Multiple PEs:  - on lovenox     Hypothyroidism       Interval History:  Seen and examined. Mg low  bp okay  Cr. Stable   No cp, sob, n/v/d reported. Current Medications: all current  Medications have been eviewed in EPIC  Review of Systems: Pertinent items are noted in HPI. Objective:  Vitals:    Vitals:    02/25/17 0500 02/25/17 0600 02/25/17 0700 02/25/17 0800   BP: 96/69 115/65 104/69 96/58   Pulse: 82 87 83 87   Resp: 13 17 15 15   Temp:    97.4 °F (36.3 °C)   SpO2:   97%    Weight:       Height:         Intake and Output:  02/25 0701 - 02/25 1900  In: 203 [I.V.:203]  Out: 15 [Urine:15]  02/23 1901 - 02/25 0700  In: 5339.1 [P.O.:585; I.V.:4754.1]  Out: 449 [Urine:449]    Physical Examination:  Pt intubated     No  General: NAD,Conversant   Neck:  Supple, no mass, no JVD  Resp:  Lungs mostly clear b/l  CV:  RRR,  no murmur or rub, no LE edema  GI:  Soft, NT, + Bowel sounds,  Neurologic:  Non focal  Skin:  No Rash  :  Ricketts in place    []    High complexity decision making was performed  []    Patient is at high-risk of decompensation with multiple organ involvement    Lab Data Personally Reviewed: I have reviewed all the pertinent labs, microbiology data and radiology studies during assessment.     Recent Labs      02/25/17   0940  02/25/17   0330  02/24/17   0317  02/23/17   0409   02/22/17   1202   NA  139  143  140  138   < >   --    K  3.7  3.2*  3.8  4.5   < >   --    CL  108  111*  107  103   < >   --    CO2  19*  20*  20*  21   < >   -- GLU  134*  122*  103*  148*   < >   --    BUN  20  18  19  19   < >   --    CREA  1.26*  1.22*  1.40*  1.89*   < >   --    CA  8.0*  7.7*  7.8*  7.9*   < >   --    MG  1.5*  1.8  1.7  1.9   < >   --    PHOS   --    --   4.1  5.8*   --    --    ALB   --   1.7*  1.7*  1.9*   < >   --    SGOT   --   213*  171*  155*   < >   --    ALT   --   97*  71  60   < >   --    INR   --    --   1.4*   --    --   1.2*    < > = values in this interval not displayed. Recent Labs      02/25/17   0330  02/24/17   0317  02/23/17   0409   WBC  11.2*  11.9*  15.1*   HGB  12.1  12.0  14.1   HCT  36.1  36.3  42.6   PLT  242  228  269     No results found for: [facility]  Lab Results   Component Value Date/Time    Culture result: NO GROWTH 2 DAYS 02/22/2017 12:13 PM    Culture result: ESCHERICHIA COLI 12/22/2016 11:01 PM    Culture result: KLEBSIELLA PNEUMONIAE 12/22/2016 11:01 PM     Recent Results (from the past 24 hour(s))   TSH 3RD GENERATION    Collection Time: 02/25/17  3:30 AM   Result Value Ref Range    TSH 15.50 (H) 0.36 - 7.59 uIU/mL   METABOLIC PANEL, COMPREHENSIVE    Collection Time: 02/25/17  3:30 AM   Result Value Ref Range    Sodium 143 136 - 145 mmol/L    Potassium 3.2 (L) 3.5 - 5.1 mmol/L    Chloride 111 (H) 97 - 108 mmol/L    CO2 20 (L) 21 - 32 mmol/L    Anion gap 12 5 - 15 mmol/L    Glucose 122 (H) 65 - 100 mg/dL    BUN 18 6 - 20 MG/DL    Creatinine 1.22 (H) 0.55 - 1.02 MG/DL    BUN/Creatinine ratio 15 12 - 20      GFR est AA 51 (L) >60 ml/min/1.73m2    GFR est non-AA 42 (L) >60 ml/min/1.73m2    Calcium 7.7 (L) 8.5 - 10.1 MG/DL    Bilirubin, total 0.6 0.2 - 1.0 MG/DL    ALT (SGPT) 97 (H) 12 - 78 U/L    AST (SGOT) 213 (H) 15 - 37 U/L    Alk.  phosphatase 228 (H) 45 - 117 U/L    Protein, total 5.4 (L) 6.4 - 8.2 g/dL    Albumin 1.7 (L) 3.5 - 5.0 g/dL    Globulin 3.7 2.0 - 4.0 g/dL    A-G Ratio 0.5 (L) 1.1 - 2.2     CBC W/O DIFF    Collection Time: 02/25/17  3:30 AM   Result Value Ref Range    WBC 11.2 (H) 3.6 - 11.0 K/uL RBC 4.15 3.80 - 5.20 M/uL    HGB 12.1 11.5 - 16.0 g/dL    HCT 36.1 35.0 - 47.0 %    MCV 87.0 80.0 - 99.0 FL    MCH 29.2 26.0 - 34.0 PG    MCHC 33.5 30.0 - 36.5 g/dL    RDW 20.4 (H) 11.5 - 14.5 %    PLATELET 566 091 - 977 K/uL   MAGNESIUM    Collection Time: 02/25/17  3:30 AM   Result Value Ref Range    Magnesium 1.8 1.6 - 2.4 mg/dL   METABOLIC PANEL, BASIC    Collection Time: 02/25/17  9:40 AM   Result Value Ref Range    Sodium 139 136 - 145 mmol/L    Potassium 3.7 3.5 - 5.1 mmol/L    Chloride 108 97 - 108 mmol/L    CO2 19 (L) 21 - 32 mmol/L    Anion gap 12 5 - 15 mmol/L    Glucose 134 (H) 65 - 100 mg/dL    BUN 20 6 - 20 MG/DL    Creatinine 1.26 (H) 0.55 - 1.02 MG/DL    BUN/Creatinine ratio 16 12 - 20      GFR est AA 50 (L) >60 ml/min/1.73m2    GFR est non-AA 41 (L) >60 ml/min/1.73m2    Calcium 8.0 (L) 8.5 - 10.1 MG/DL   MAGNESIUM    Collection Time: 02/25/17  9:40 AM   Result Value Ref Range    Magnesium 1.5 (L) 1.6 - 2.4 mg/dL               Raina Harris MD  [facility] [address]  Phone - [phone]   Fax - [phone]  www. [email or web address] CenterTrans[facility] Solutions

## 2017-02-25 NOTE — PROGRESS NOTES
Hospitalist Progress Note  [place] MD  Office: [phone]  Cell: [phone]      Date of Service:  2017  NAME:  Lisha Mercado  :  1935  MRN:  [id number]      Admission Summary:   Lisha Mercado is a [de-identified] y.o. female with history of prior pulmonary embolism, pulmonary stenosis, RV failure, HTN, chronic pain, morbid obesity, prior left pleural effusion (?parapneumonic) who presents with shortness of breath x 5 days. She has had multiple recent admissions to [facility] in the past year, most recently seen -2016 for chest pain, where she was diagnosed with pneumonia. She had been discharged to home at that time and had reportedly been in her usual state of health until this weekend, when she had developed initially intermittent shortness of breath, which increased in frequency until she woke up this morning and had constant shortness of breath, which prompted her  to call EMS. She had additionally developed abdominal pain with nausea earlier this morning. She was reported to be hypoxic upon EMS arrival with O2 sat 68% on room air. She was placed on 10 liters nasal cannula and subsequently transported to [facility] ED for further evaluation.     She additionally reports subjective fever with measured temperature of T 99F on Friday prior to admission with chills. She denies chest pain, cough, congestion, sick contacts, recent changes in her medications, travel.     She has actually lost weight per her report. She denies any increasing lower extremity edema.      Of note, she does state that she had stopped her anticoagulation because she did not like how it had made her feel (she had been discharged on Xarelto in December). Interval history / Subjective:   No significant improvement in patient's condition. Cr coming down, but no change in renal output. Still on pressors, some mottling of the toes.   Discussed with patient this morning, with daughter and son yesterday. Overall, family leaning towards comfort care and possible Hospice. I will consult Hospice to discuss possible transition to Inpatient vs home hospice. [facility] Noted TSH is high, will give an extra dose of Synthroid and switch to IV. No obvious source of infection other than UTI: discontinue Vancomycin, continue Zosyn for now. Assessment & Plan:     1. Acute hypoxic respiratory failure  - secondary to multiple PEs;  - patient had stopped Xarelto  - continue Lovenox;   - CXR 2/22 - no acute process  - CTA chest: multiple bilateral large proximal pulmonary emboli;  - required BiPAP on admission;   - 2/23: weaned off the BIPAP today;   - 2/24: off the BIPAP and on NC; no resp distress;     2. Hypotension  - Most likely due to sepsis;  - ? Volume depletion, ? Heart failure - would suspect RV failure  - started on pressors (Levophed and Syed);  - influenza negative  - blood cultures 2/22: no growth;  - urinalysis 2/22 - 10-20 WBCs, 2+ bacteria;  - urine culture: Klebsiella, pansusceptible;   - empiric coverage with Zosyn, vancomycin for now, received levofloxacin in ED  - 2/23: continue pressors;   - 2/24: remains on pressors; still hypotensive; poor urine output. - 2/25: no evidence of bacteremia or Gram positive infection: discontinue Vancomycin, especially since her kidneys are failing; continue Zosyn for now. Add steroids.        3. Elevated BNP  - uncertain significance, she has some elements in her history consistent with heart failure (ie - nocturnal dyspnea becoming more progressive), but appears volume deplete based upon examination, ?  RV failure  - Echo 12/23/16 - EF 55-60%, noted RVSP 50 mm Hg, RV systolic function moderately reduced, moderate hypokinesis of the basal-mid free wall and basal-mid diaphragmatic wall  - repeat Echocardiogram  - give IV fluids for now  - hold home atenolol  - cardiology consulted     4. Lactic acidosis:  - secondary to sepsis and volume contraction;  - continue IV fluids   - WBC count improving;  - blood culture: no growth x 2 days;     5. Elevated transaminases  - unclear etiology, ? Hepatic congestion vs biliary disorder, pain does not really localize to RUQ on examination  - check CT Abd/Pelvis, if negative and persistently elevated, may need to pursue abdominal ultrasonography  - repeat CMP in AM: LFTs worsening;     6. Abdominal pain  - wide differential could include cholecystitis vs mesenteric ischemia vs gastroenteritis, etc.  - lipase non-elevated  - pain control;  - CT abdo/pelvis: Hyperemia and straightening/narrowing of the left colon although without mural thickening, nonspecific, possibly inflammatory, infectious or ischemic.  - clinically most consistent with ischemia in the setting of hypotension and hypoperfusion;      7. Bilateral Pulmonary emboli:  - diagnosed in 2015, she had been on Xarelto since her discharge 12/2016, but had stopped  - CTA: bilateral large proximal pulmonary emboli;  - continue Lovenox;     8. HTN  - she is currently hypotensive, hold home atenolol     9. Hyponatremia: resolved;  - suspect this is hypovolemic hyponatremia even with her elevated BNP, will trial fluid hydration;    10. Acute Renal failure:  - Oliguric;   - Cr improving;  - patient has repeatedly stated that she does not want dialysis; family understands and agrees that she would not want it;   - appreciate Nephrology f/u;    Hypothyroidism:  - increase dose of Synthroid;  - TSH 15.50.       Code status: DNR  DVT prophylaxis: Heparin    Care Plan discussed with: Patient/Family and Nurse  Disposition: TBD     Grim prognosis with multiorgan failure. Consult Hospice per family's wishes. Patient will likely meet inpatient criteria.          Hospital Problems  Date Reviewed: 2/22/2017          Codes Class Noted POA    Hypotension ICD-10-CM: I95.9  ICD-9-CM: 458.9  2/22/2017 Yes * (Principal)Respiratory failure with hypoxia ([facility] [address] ICD-10-CM: J96.91  ICD-9-CM: 518.81  2/22/2017 Yes        History of pulmonary embolism ICD-10-CM: V74.351  ICD-9-CM: V12.55  8/1/2016 Yes        Hypertension, essential, benign ICD-10-CM: I10  ICD-9-CM: 401.1  8/1/2016 Yes        Morbid obesity with body mass index of 40.0-49.9 ([facility]) ICD-10-CM: E66.01  ICD-9-CM: 278.01  8/1/2016 Yes        RVF (right ventricular failure) ([facility]) ICD-10-CM: I50.9  ICD-9-CM: 428.0  7/14/2015 Yes                Review of Systems:   Review of systems not obtained due to patient factors. Vital Signs:    Last 24hrs VS reviewed since prior progress note. Most recent are:  Visit Vitals    BP 96/58    Pulse 87    Temp 97.9 °F (36.6 °C)    Resp 15    Ht 5' 2\" (1.575 m)    Wt 102.5 kg (225 lb 15.5 oz)    SpO2 97%    BMI 41.33 kg/m2         Intake/Output Summary (Last 24 hours) at 02/25/17 9028  Last data filed at 02/25/17 5513   Gross per 24 hour   Intake          3659.65 ml   Output              251 ml   Net          3408.65 ml        Physical Examination:             Constitutional:  No acute distress, cooperative, pleasant    ENT:  Oral mucous moist, oropharynx benign. Neck supple,    Resp:  CTA bilaterally. No wheezing/rhonchi/rales. No accessory muscle use   CV:  Regular rhythm, normal rate, no murmurs, gallops, rubs    GI:  Soft, non distended, non tender. normoactive bowel sounds;    Musculoskeletal:  No edema, warm,;    Neurologic:  Moves all extremities.   Answers questions appropriately     Skin:  warm and dry, but feet cold, and some distant mottling of the skin is noted;       Data Review:    Review and/or order of clinical lab test  Review and/or order of tests in the radiology section of CPT  Review and/or order of tests in the medicine section of CPT      Labs:     Recent Labs      02/25/17   0330  02/24/17   0317   WBC  11.2*  11.9*   HGB  12.1  12.0   HCT  36.1  36.3   PLT  242  228     Recent Labs 02/25/17   0330  02/24/17 0317  02/23/17   0409   NA  143  140  138   K  3.2*  3.8  4.5   CL  111*  107  103   CO2  20*  20*  21   BUN  18  19  19   CREA  1.22*  1.40*  1.89*   GLU  122*  103*  148*   CA  7.7*  7.8*  7.9*   MG  1.8  1.7  1.9   PHOS   --   4.1  5.8*     Recent Labs      02/25/17   0330 02/24/17 0317 02/23/17   0409  02/22/17   1430   02/22/17   1327   SGOT  213*  171*  155*  128*   < >   --    ALT  97*  71  60  47   < >   --    AP  228*  235*  270*  264*   < >   --    TBILI  0.6  0.6  0.7  0.8   < >   --    TP  5.4*  5.5*  6.3*  6.1*   < >   --    ALB  1.7*  1.7*  1.9*  1.5*   < >   --    GLOB  3.7  3.8  4.4*  4.6*   < >   --    LPSE   --    --    --   QUANTITY NOT SUFFICIENT. SUGGEST RECOLLECTION   --   49*    < > = values in this interval not displayed. Recent Labs      02/24/17 0317 02/22/17   1202   INR  1.4*  1.2*   PTP  14.7*  12.1*   APTT   --   25.1      No results for input(s): FE, TIBC, PSAT, FERR in the last 72 hours. No results found for: FOL, RBCF   No results for input(s): PH, PCO2, PO2 in the last 72 hours.   Recent Labs      02/22/17   1430   TROIQ  0.04     Lab Results   Component Value Date/Time    Cholesterol, total 78 10/26/2016 03:55 AM    HDL Cholesterol 19 10/26/2016 03:55 AM    LDL, calculated 40.6 10/26/2016 03:55 AM    Triglyceride 92 10/26/2016 03:55 AM    CHOL/HDL Ratio 4.1 10/26/2016 03:55 AM     Lab Results   Component Value Date/Time    Glucose (POC) 121 10/25/2016 09:22 AM     Lab Results   Component Value Date/Time    Color DARK YELLOW 02/22/2017 12:02 PM    Appearance CLOUDY 02/22/2017 12:02 PM    Specific gravity 1.020 02/22/2017 12:02 PM    pH (UA) 5.0 02/22/2017 12:02 PM    Protein NEGATIVE  02/22/2017 12:02 PM    Glucose NEGATIVE  02/22/2017 12:02 PM    Ketone NEGATIVE  02/22/2017 12:02 PM    Bilirubin NEGATIVE  12/22/2016 11:01 PM    Urobilinogen 1.0 02/22/2017 12:02 PM    Nitrites NEGATIVE  02/22/2017 12:02 PM    Leukocyte Esterase LARGE 02/22/2017 12:02 PM    Epithelial cells FEW 02/22/2017 12:02 PM    Bacteria 2+ 02/22/2017 12:02 PM    WBC 10-20 02/22/2017 12:02 PM    RBC 0-5 02/22/2017 12:02 PM         Medications Reviewed:     Current Facility-Administered Medications   Medication Dose Route Frequency    enoxaparin (LOVENOX) injection 100 mg  100 mg SubCUTAneous Q12H    vancomycin (VANCOCIN) 1250 mg in  ml infusion  1,250 mg IntraVENous Q24H    levothyroxine (SYNTHROID) tablet 25 mcg  25 mcg Oral ACB    0.9% sodium chloride infusion  3 mL/hr IntraVENous CONTINUOUS    sodium chloride (NS) flush 20 mL  20 mL IntraVENous ONCE PRN    balsam peru-castor oil (VENELEX)  mg/gram ointment   Topical BID    sodium chloride (NS) flush 5-10 mL  5-10 mL IntraVENous Q8H    sodium chloride (NS) flush 5-10 mL  5-10 mL IntraVENous PRN    HYDROmorphone (PF) (DILAUDID) injection 0.5 mg  0.5 mg IntraVENous Q4H PRN    acetaminophen (TYLENOL) tablet 650 mg  650 mg Oral Q4H PRN    HYDROcodone-acetaminophen (NORCO) 5-325 mg per tablet 1 Tab  1 Tab Oral Q4H PRN    ondansetron (ZOFRAN) injection 4 mg  4 mg IntraVENous Q6H PRN    PHENYLephrine (NEOSYNEPHRINE) 30,000 mcg in 0.9% sodium chloride 250 mL infusion   mcg/min IntraVENous TITRATE    0.9% sodium chloride infusion  100 mL/hr IntraVENous CONTINUOUS    piperacillin-tazobactam (ZOSYN) 3.375 g in 0.9% sodium chloride (MBP/ADV) 100 mL  3.375 g IntraVENous Q8H    pantoprazole (PROTONIX) 40 mg in sodium chloride 0.9 % 10 mL injection  40 mg IntraVENous DAILY    Vancomycin - pharmacy to dose   Other Rx Dosing/Monitoring    NOREPINephrine (LEVOPHED) 8,000 mcg in dextrose 5% 250 mL infusion  2-16 mcg/min IntraVENous TITRATE     ______________________________________________________________________  EXPECTED LENGTH OF STAY: 4d 21h  ACTUAL LENGTH OF STAY:          3                 Duc Judge MD

## 2017-02-25 NOTE — HOSPICE
Hospice Liaison RN    Met with patient,  and two adult children. Discussed hospice philosophy, general plan of care, levels of care, services, on call procedures. Hospice information packet reviewed and provided to patients . Discussed option of hospice services at the [facility]. Patient stated that she wants to stop \"being pricked and prodded\" and \"to be allowed to die in peace\". Her son Cindi Hunt is arriving from [place] this evening. Will follow up with patient and family tomorrow. Discussed with Dr. Debby Sterling.     Cristino Hearn RN, MSN  [facility]

## 2017-02-25 NOTE — PROGRESS NOTES
Problem: Falls - Risk of  Goal: *Absence of falls  Outcome: Progressing Towards Goal  flex scale complete, pt instructed to use call bell for assistance.   Call bell and bedside table within reach, slip resistant footwear applied, bed rails up x3    Problem: Pressure Ulcer - Risk of  Goal: *Prevention of pressure ulcer  Outcome: Progressing Towards Goal  Turning pr q2 hr, skin tears under pannus (ABD) and right buttock - xenaderm/venalex to feet dusky extremities    Problem: Sepsis: Day 3  Goal: *Central Venous Pressure maintained at 8-12 mm Hg  Outcome: Progressing Towards Goal  CVP 13  Goal: *Oxygen saturation within defined limits  Outcome: Progressing Towards Goal  Nasal cannula 6 liters or BIPAP 60% to maintain O2sats, difficult to maintain pulse ox pleth  Goal: Activity/Safety  Outcome: Progressing Towards Goal  Bedrest at this time, turning q 2 hrs  Goal: Consults, if ordered  PT/OT consults    Problem: Nutrition Deficit  Goal: *Optimize nutritional status  Outcome: Progressing Towards Goal  Poor appetite, encouraging PO intake

## 2017-02-26 NOTE — PROGRESS NOTES
2200 patient taking off nonrebreather, desatting to upper 70s; requesting to not wear it anymore; face tent at 100% applied with 6L NC      Bedside and Verbal shift change report given to Mariela VO by Tesha Hebert RN. Report included the following information SBAR, Kardex, Intake/Output, MAR, Accordion, Recent Results and Cardiac Rhythm sinus rhythm with bursts of atach. Call bell and personal items within patient's reach.       Problem: Falls - Risk of  Goal: *Absence of falls  Outcome: Progressing Towards Goal  Bed in low and locked position; call bell and personal belongings within reach, side rails up x3, nonskid footwear, fall precaution bracelet and door identifier in place  Goal: *Knowledge of fall prevention  Outcome: Progressing Towards Goal  Patient educated on use of call bell and instructed to call for assistance; patient verbalized understanding of instructions     Problem: Pressure Ulcer - Risk of  Goal: *Prevention of pressure ulcer  Outcome: Progressing Towards Goal  Pressure ulcer risk assessment tool in use; Patient turned q2, pillows and pressure reducing mattress in use; blood glucose monitored as needed; venelex applied to bony prominences; incontinence care provided as needed      Problem: Sepsis: Day 4  Goal: *Oxygen saturation within defined limits  Outcome: Progressing Towards Goal  Patient requiring face tent and NC at 6L to keep sats >90%; patient requesting to not wear bipap any further   Goal: *Hemodynamically stable  Outcome: Progressing Towards Goal  Weaning levophed to keep MAP >65, currently requiring 10 mcg/min  Goal: *Vital signs within defined limits  Outcome: Progressing Towards Goal  VSS  Goal: *Tolerating diet  Outcome: Progressing Towards Goal  Able to tolerate full liquid diet  Goal: *Demonstrates progressive activity  Outcome: Not Progressing Towards Goal  Patient extremely weak, though has use of arms  Goal: *Fluid volume maintenance  Outcome: Progressing Towards Goal  On 1/2NS with HCO3 gtt at 75mL/hr; urine output monitored hourly

## 2017-02-26 NOTE — PROGRESS NOTES
0800 - Report received from Yamila Ba RN  1010 - titrated levo from 4 to 2, SBP remains >100, will continue to monitor. 1030 - /75 per cuff. Levophed put on standby at this time. 1200 - pt had liquid stool to the foot of the bed, 2nd episode this shift. Flexiseal placed.

## 2017-02-26 NOTE — PROGRESS NOTES
Physical Therapy    Attempted to see pt for skilled Physical Therapy evaluation. PT aware pt and family considering hospice at this time. Offered PT services to patient with goal to get OOB and into chair since she has been in bed for multiple bed. Pt declined offer reporting \"I just don't want to be poked and prodded and moved, I am comfortable here\". Educated patient on importance of mobility despite looking into hospice as she was mobile prior to admission. Pt declined. Will follow up with patient tomorrow.     Thank Preston Royal PT,DPT

## 2017-02-27 NOTE — PROGRESS NOTES
RENAE called this afternoon as awaiting follow-up from [facility] team - RENAE spoke with intake and she has notified hospice team to find out status of patient's transition to hospice at this time.   KRISTA Sigala

## 2017-02-27 NOTE — PROGRESS NOTES
TRANSFER - IN REPORT:    Verbal report received from [place](name) on Mee Polanco  being received from [facility](unit) for routine progression of care      Report consisted of patients Situation, Background, Assessment and   Recommendations(SBAR). Information from the following report(s) SBAR, Kardex, [facility] and Recent Results was reviewed with the receiving nurse. Opportunity for questions and clarification was provided. Assessment completed upon patients arrival to unit and care assumed.

## 2017-02-27 NOTE — HOSPICE
Hospice Liaison RN:    Hospice Liaison met with patient and family on 2/25/17 to discuss Hospice Philosophy, services and  levels of care. Liaison was asked to evaluate patient for inpatient hospice admission after meeting with Palliative Medicine. Over the following two days patients medical status has stabilized and she is now off pressors. Her urine output  Has increased over past 48 hrs. Patient is not exhibiting pain, acute or chronic symptoms that require inpatient management at this time. Her vital signs are stable. Temp 98.5,  PO2 92%  2L O2 NC,  BP 97/70 off medications. She has not received  medication of pain, anxiety, nausea , dyspnea or delerium. Discussed with Dr. Ingrid Sheldon , Medical Director of Hospice Patient does not meet criteria for inpatient hospice admission at this time. Discussed with Dr. Courtney Mercado, Palliative Medicine who has ordered comfort measures. Hospice Team will continue to follow and assess for inpatient appropriateness. Discussed with patients  and daugter. At this time they do not feel they could take patient  home with hospice services. Hospice SW will follow up with family to discuss options.      Nicole Martinez RN MSN  Hospice Liaison  [phone]

## 2017-02-27 NOTE — PROGRESS NOTES
Pt transitioning to Hospice   Will sign off  Thank you for allowing us to participate in your care       Ismael Melgar MD, [facility].   Office :[phone]  Fax: [phone]

## 2017-02-27 NOTE — PROGRESS NOTES
Hospitalist Progress Note  Renata Peterson MD  Office: [phone]  Cell: [phone]      Date of Service:  2017  NAME:  Corby Miranda  :  1935  MRN:  [id number]      Admission Summary:   Corby Miranda is a [de-identified] y.o. female with history of prior pulmonary embolism, pulmonary stenosis, RV failure, HTN, chronic pain, morbid obesity, prior left pleural effusion (?parapneumonic) who presents with shortness of breath x 5 days. She has had multiple recent admissions to [facility] in the past year, most recently seen -2016 for chest pain, where she was diagnosed with pneumonia. She had been discharged to home at that time and had reportedly been in her usual state of health until this weekend, when she had developed initially intermittent shortness of breath, which increased in frequency until she woke up this morning and had constant shortness of breath, which prompted her  to call EMS. She had additionally developed abdominal pain with nausea earlier this morning. She was reported to be hypoxic upon EMS arrival with O2 sat 68% on room air. She was placed on 10 liters nasal cannula and subsequently transported to [facility] ED for further evaluation.     She additionally reports subjective fever with measured temperature of T 99F on Friday prior to admission with chills. She denies chest pain, cough, congestion, sick contacts, recent changes in her medications, travel.     She has actually lost weight per her report. She denies any increasing lower extremity edema.      Of note, she does state that she had stopped her anticoagulation because she did not like how it had made her feel (she had been discharged on Xarelto in December). Interval history / Subjective:   No significant improvement in patient's condition. Still no urine output  Will try to get the patient off pressors today.   Hospice consulted: possible transition to Inpatient vs home hospice. Patient is in agreement with [address] [place] [address]. Assessment & Plan:     1. Acute hypoxic respiratory failure  - secondary to multiple PEs;  - patient had stopped Xarelto  - continue Lovenox;   - CXR 2/22 - no acute process  - CTA chest: multiple bilateral large proximal pulmonary emboli;  - required BiPAP on admission;   - 2/23: weaned off the BIPAP today;   - 2/24: off the BIPAP and on NC; no resp distress;  - remains on and off on NC.     2. Hypotension  - Most likely due to sepsis;  - ? Volume depletion, ? Heart failure - would suspect RV failure  - started on pressors (Levophed and Syed);  - influenza negative  - blood cultures 2/22: no growth;  - urinalysis 2/22 - 10-20 WBCs, 2+ bacteria;  - urine culture: Klebsiella, pansusceptible;   - empiric coverage with Zosyn, vancomycin for now, received levofloxacin in ED  - 2/23: continue pressors;   - 2/24: remains on pressors; still hypotensive; poor urine output. - 2/25: no evidence of bacteremia or Gram positive infection: discontinue Vancomycin, especially since her kidneys are failing; continue Zosyn for now. Add steroids.        3. Elevated BNP  - uncertain significance, she has some elements in her history consistent with heart failure (ie - nocturnal dyspnea becoming more progressive), but appears volume deplete based upon examination, ? RV failure  - Echo 12/23/16 - EF 55-60%, noted RVSP 50 mm Hg, RV systolic function moderately reduced, moderate hypokinesis of the basal-mid free wall and basal-mid diaphragmatic wall  - repeat Echocardiogram  - give IV fluids for now  - hold home atenolol  - cardiology consulted     4. Lactic acidosis:  - secondary to sepsis and volume contraction;  - continue IV fluids   - WBC count improving;  - blood culture: no growth x 2 days;     5. Elevated transaminases  - unclear etiology, ?  Hepatic congestion vs biliary disorder, pain does not really localize to RUQ on examination  - check CT Abd/Pelvis, if negative and persistently elevated, may need to pursue abdominal ultrasonography  - repeat CMP in AM: LFTs worsening;     6. Abdominal pain  - wide differential could include cholecystitis vs mesenteric ischemia vs gastroenteritis, etc.  - lipase non-elevated  - pain control;  - CT abdo/pelvis: Hyperemia and straightening/narrowing of the left colon although without mural thickening, nonspecific, possibly inflammatory, infectious or ischemic.  - clinically most consistent with ischemia in the setting of hypotension and hypoperfusion;      7. Bilateral Pulmonary emboli:  - diagnosed in 2015, she had been on Xarelto since her discharge 12/2016, but had stopped  - CTA: bilateral large proximal pulmonary emboli;  - continue Lovenox;     8. HTN  - she is currently hypotensive, hold home atenolol     9. Hyponatremia: resolved;  - suspect this is hypovolemic hyponatremia even with her elevated BNP, will trial fluid hydration;    10. Acute Renal failure:  - Oliguric;   - Cr improving;  - patient has repeatedly stated that she does not want dialysis; family understands and agrees that she would not want it;   - appreciate Nephrology f/u;    Hypothyroidism:  - increase dose of Synthroid;  - TSH 15.50.       Code status: DNR  DVT prophylaxis: Heparin    Care Plan discussed with: Patient/Family and Nurse  Disposition: TBD     Grim prognosis with multiorgan failure. Hospice following.            Hospital Problems  Date Reviewed: 2/22/2017          Codes Class Noted POA    Hypotension ICD-10-CM: I95.9  ICD-9-CM: 458.9  2/22/2017 Yes        * (Principal)Respiratory failure with hypoxia [facility]) ICD-10-CM: J96.91  ICD-9-CM: 518.81  2/22/2017 Yes        History of pulmonary embolism ICD-10-CM: M14.948  ICD-9-CM: V12.55  8/1/2016 Yes        Hypertension, essential, benign ICD-10-CM: I10  ICD-9-CM: 401.1  8/1/2016 Yes        Morbid obesity with body mass index of 40.0-49.9 (HCC) ICD-10-CM: E66.01  ICD-9-CM: 278.01  8/1/2016 Yes RVF (right ventricular failure) ([facility] ICD-10-CM: I50.9  ICD-9-CM: 428.0  7/14/2015 Yes                Review of Systems:   Review of systems not obtained due to patient factors. Vital Signs:    Last 24hrs VS reviewed since prior progress note. Most recent are:  Visit Vitals    BP (!) 88/55    Pulse 89    Temp 97.9 °F (36.6 °C)    Resp 20    Ht 5' 2\" (1.575 m)    Wt 110.7 kg (244 lb 0.8 oz)    SpO2 94%    BMI 44.64 kg/m2         Intake/Output Summary (Last 24 hours) at 02/26/17 2138  Last data filed at 02/26/17 2100   Gross per 24 hour   Intake          1997.31 ml   Output              355 ml   Net          1642.31 ml        Physical Examination:             Constitutional:  No acute distress, cooperative, pleasant    ENT:  Oral mucous moist, oropharynx benign. Neck supple,    Resp:  CTA bilaterally. No wheezing/rhonchi/rales. No accessory muscle use   CV:  Regular rhythm, normal rate, no murmurs, gallops, rubs    GI:  Soft, non distended, non tender. normoactive bowel sounds;    Musculoskeletal:  No edema, warm,;    Neurologic:  Moves all extremities.   Answers questions appropriately     Skin:  warm and dry, but feet cold, and some distant mottling of the skin is noted;       Data Review:    Review and/or order of clinical lab test  Review and/or order of tests in the radiology section of CPT  Review and/or order of tests in the medicine section of CPT      Labs:     Recent Labs      02/25/17   0330  02/24/17 0317   WBC  11.2*  11.9*   HGB  12.1  12.0   HCT  36.1  36.3   PLT  242  228     Recent Labs      02/26/17   0538  02/25/17   0940  02/25/17   0330  02/24/17   0317   NA  140  139  143  140   K  3.4*  3.7  3.2*  3.8   CL  108  108  111*  107   CO2  20*  19*  20*  20*   BUN  17  20  18  19   CREA  1.14*  1.26*  1.22*  1.40*   GLU  163*  134*  122*  103*   CA  7.8*  8.0*  7.7*  7.8*   MG  2.1  1.5*  1.8  1.7   PHOS   --    --    --   4.1     Recent Labs      02/26/17   0538  02/25/17   0330 02/24/17 0317   SGOT  141*  213*  171*   ALT  94*  97*  71   AP  233*  228*  235*   TBILI  0.6  0.6  0.6   TP  5.5*  5.4*  5.5*   ALB  1.6*  1.7*  1.7*   GLOB  3.9  3.7  3.8     Recent Labs      02/24/17 0317   INR  1.4*   PTP  14.7*      No results for input(s): FE, TIBC, PSAT, FERR in the last 72 hours. No results found for: FOL, RBCF   No results for input(s): PH, PCO2, PO2 in the last 72 hours. No results for input(s): CPK, CKNDX, TROIQ in the last 72 hours.     No lab exists for component: CPKMB  Lab Results   Component Value Date/Time    Cholesterol, total 78 10/26/2016 03:55 AM    HDL Cholesterol 19 10/26/2016 03:55 AM    LDL, calculated 40.6 10/26/2016 03:55 AM    Triglyceride 92 10/26/2016 03:55 AM    CHOL/HDL Ratio 4.1 10/26/2016 03:55 AM     Lab Results   Component Value Date/Time    Glucose (POC) 121 10/25/2016 09:22 AM     Lab Results   Component Value Date/Time    Color DARK YELLOW 02/22/2017 12:02 PM    Appearance CLOUDY 02/22/2017 12:02 PM    Specific gravity 1.020 02/22/2017 12:02 PM    pH (UA) 5.0 02/22/2017 12:02 PM    Protein NEGATIVE  02/22/2017 12:02 PM    Glucose NEGATIVE  02/22/2017 12:02 PM    Ketone NEGATIVE  02/22/2017 12:02 PM    Bilirubin NEGATIVE  12/22/2016 11:01 PM    Urobilinogen 1.0 02/22/2017 12:02 PM    Nitrites NEGATIVE  02/22/2017 12:02 PM    Leukocyte Esterase LARGE 02/22/2017 12:02 PM    Epithelial cells FEW 02/22/2017 12:02 PM    Bacteria 2+ 02/22/2017 12:02 PM    WBC 10-20 02/22/2017 12:02 PM    RBC 0-5 02/22/2017 12:02 PM         Medications Reviewed:     Current Facility-Administered Medications   Medication Dose Route Frequency    pantoprazole (PROTONIX) 40 mg in sodium chloride 0.9 % 10 mL injection  40 mg IntraVENous Q12H    levothyroxine (SYNTHROID) injection 25 mcg  25 mcg IntraVENous Q24H    0.45% sodium chloride 1,000 mL with sodium bicarbonate (8.4%) 75 mEq infusion   IntraVENous CONTINUOUS    enoxaparin (LOVENOX) injection 100 mg  100 mg SubCUTAneous Q12H    0.9% sodium chloride infusion  3 mL/hr IntraVENous CONTINUOUS    sodium chloride (NS) flush 20 mL  20 mL IntraVENous ONCE PRN    balsam peru-castor oil (VENELEX)  mg/gram ointment   Topical BID    sodium chloride (NS) flush 5-10 mL  5-10 mL IntraVENous Q8H    sodium chloride (NS) flush 5-10 mL  5-10 mL IntraVENous PRN    HYDROmorphone (PF) (DILAUDID) injection 0.5 mg  0.5 mg IntraVENous Q4H PRN    acetaminophen (TYLENOL) tablet 650 mg  650 mg Oral Q4H PRN    HYDROcodone-acetaminophen (NORCO) 5-325 mg per tablet 1 Tab  1 Tab Oral Q4H PRN    ondansetron (ZOFRAN) injection 4 mg  4 mg IntraVENous Q6H PRN    piperacillin-tazobactam (ZOSYN) 3.375 g in 0.9% sodium chloride (MBP/ADV) 100 mL  3.375 g IntraVENous Q8H     ______________________________________________________________________  EXPECTED LENGTH OF STAY: 4d 21h  ACTUAL LENGTH OF STAY:          4                 Lavell Strange MD

## 2017-02-27 NOTE — CONSULTS
Palliative Medicine Consult  Scar: [phone] ([phone]    Patient Name: Nitish Sethi  YOB: 1935    Date of Initial Consult: 2/23/17  Reason for Consult: care decisions  Requesting Provider: Dr. Sesar Drake   Primary Care Physician: Crescencio Guerra MD      SUMMARY:   Nitish Sethi is a [de-identified] y.o. with a past history of PE/DVT/IVC, RV failure, pulmonary htn, chronic pain, athritis, who was admitted on 2/22/2017 from home with a diagnosis of SOB, recently stopped a/c, bilateral PE, sepsis, HARRIET. Current medical issues leading to Palliative Medicine involvement include: multiple acute medical issues, care decisions. PALLIATIVE DIAGNOSES:   1. Anxiety / depression - longstanding  2. Acute respiratory failure, Shortness of breath with exertion   3. Diarrhea  4. Debility  5. Goals of care are comfort. Patient does not want treatments other than focus on comfort. Patient requesting hospice care. PLAN:   1. Patient requests that focus of her care be comfort. She does not want to be stuck or have any procedures. She does not want anticoagulation/ SQ heparin shots. 2. She is requesting hospice care. States she has no unfinished business. 3. Met with family, including her , and one son and daughter Ady Gasca. 1. They are in agreement with enrolling in hospice at this time. 2. Explained that if she stabilizes, then would either need transition home with hospice/ [facility] or facility w hospice. 3. They plan to tour [facility] today, already heard about this from hospice team.  4. We discussed symptom management, stopping things like lab draws/procedures. Goal is only her comfort. 5. We discussed unknown prognosis. Her kidney function is actually better, but overall big picture of many medical issues/frailty/ bilateral PEs have not changed. AND, family aware that patients wish is focus on comfort only. They are accepting of this. 4. Comfort care order set initiated.   5. Heparin/abx/ levophed, labs stopped. 6. Add lomotil for diarrhea. 7. Patient wants something to help her sleep at night, scheduled ativan 1mg QHS. 8. Patient likes quiet, she does not want the TV on says it bothers her. 9. Communicated plan of care with: Palliative IDT, bedside nurse, hospice team       GOALS OF CARE / TREATMENT PREFERENCES:   [====Goals of Care====]  GOALS OF CARE:  Patient / health care proxy stated goals: recover from current illness with current treatments, if possible; avoid aggressive care at end of life      TREATMENT PREFERENCES:   Code Status: DNR    Advance Care Planning:  Advance Care Planning 2/23/2017   Patient's Healthcare Decision Maker is: Named in scanned ACP document   Primary Decision Maker Name Janneth Beth   Primary Decision Maker Phone Number [phone]   Primary Decision Maker Relationship to Patient Adult child   Secondary Decision Maker Name Dmitri Cutler   Secondary Decision Maker Relationship to Patient Spouse   Confirm Advance Directive Yes, on file   Does the patient have other document types Do Not Resuscitate       Other:    The palliative care team has discussed with patient / health care proxy about goals of care / treatment preferences for patient.  [====Goals of Care====]  Comfort only       HISTORY:   Levo at 2  VSS, some hypotension overnight        HPI/SUBJECTIVE:    The patient is:   [x] Verbal and participatory  [] Non-participatory due to:   Pt denies pain, sob, nausea. Feels a little more energy. Continues with long standing anxiety.     Clinical Pain Assessment (nonverbal scale for severity on nonverbal patients):   [++++ Clinical Pain Assessment++++]  [++++Pain Severity++++]: Pain: 0  [++++Pain Character++++]:   [++++Pain Duration++++]:   [++++Pain Effect++++]:   [++++Pain Factors++++]:   [++++Pain Frequency++++]:   [++++Pain Location++++]:   [++++ Clinical Pain Assessment++++]     FUNCTIONAL ASSESSMENT:     Palliative Performance Scale (PPS):  PPS: 30 PSYCHOSOCIAL/SPIRITUAL SCREENING:     Advance Care Planning:  Advance Care Planning 2/23/2017   Patient's Healthcare Decision Maker is: Named in scanned ACP document   Primary Decision Maker Name Herson Neff   Primary Decision Maker Phone Number [phone]   Primary Decision Maker Relationship to Patient Adult child   Secondary Decision Maker Name Nereida Dunn   Secondary Decision Maker Relationship to Patient Spouse   Confirm Advance Directive Yes, on file   Does the patient have other document types Do Not Resuscitate        Any spiritual / Islam concerns:  [] Yes /  [x] No    Caregiver Burnout:  [] Yes /  [x] No /  [] No Caregiver Present      Anticipatory grief assessment:   [x] Normal  / [] Maladaptive       ESAS Anxiety: Anxiety: 7    ESAS Depression: Depression: 7        REVIEW OF SYSTEMS:     Positive and pertinent negative findings in ROS are noted above in HPI. The following systems were [x] reviewed / [] unable to be reviewed as noted in HPI  Other findings are noted below. Systems: constitutional, ears/nose/mouth/throat, respiratory, gastrointestinal, genitourinary, musculoskeletal, integumentary, neurologic, psychiatric, endocrine. Positive findings noted below. Modified ESAS Completed by: provider   Fatigue: 9 Drowsiness: 1   Depression: 7 Pain: 0   Anxiety: 7 Nausea: 0   Anorexia: 0 Dyspnea: 0     Constipation: No     Stool Occurrence(s): 1        PHYSICAL EXAM:     From RN flowsheet:  Wt Readings from Last 3 Encounters:   02/27/17 248 lb 7.3 oz (112.7 kg)   12/28/16 229 lb 8 oz (104.1 kg)   10/26/16 229 lb 11.2 oz (104.2 kg)     Blood pressure 111/56, pulse 92, temperature 98.4 °F (36.9 °C), resp. rate 25, height 5' 2\" (1.575 m), weight 248 lb 7.3 oz (112.7 kg), SpO2 93 %.     Pain Scale 1: Numeric (0 - 10)  Pain Intensity 1: 0  Pain Onset 1: \"lying in bed\"  Pain Location 1: Back  Pain Orientation 1: Lower  Pain Description 1: Aching  Pain Intervention(s) 1: Medication (see MAR)  Last bowel movement, if known:     Constitutional: aa, nad, fatigued  No respiratory distress  Patient is pleasant and engaging  Dry mouth, able to eat ice chips without difficulty  Normal affect. Clear about her wishes. She expresses satisfaction with her life closure     HISTORY:     Principal Problem:    Respiratory failure with hypoxia ([address] (2/22/2017)    Active Problems:    RVF (right ventricular failure) ([address] (7/14/2015)      History of pulmonary embolism (8/1/2016)      Hypertension, essential, benign (8/1/2016)      Morbid obesity with body mass index of 40.0-49.9 ([address] (8/1/2016)      Hypotension (2/22/2017)      Past Medical History:   Diagnosis Date    After cataract, bilateral     Arthritis     Chronic pain     back pain, left leg pain    Hypertension     Hypertension, essential, benign 8/1/2016    Pulmonary emboli (HCC)     Pulmonary embolism ([address] 7/14/2015    Pulmonary stenosis     RVF (right ventricular failure) ([address] 7/14/2015      Past Surgical History:   Procedure Laterality Date    ABDOMEN SURGERY PROC UNLISTED      cholecystectomy    HX GI      HX ORTHOPAEDIC      bilateral knee replacements      History reviewed. No pertinent family history. History reviewed, no pertinent family history.   Social History   Substance Use Topics    Smoking status: Former Smoker    Smokeless tobacco: Never Used    Alcohol use No     No Known Allergies   Current Facility-Administered Medications   Medication Dose Route Frequency    LORazepam (ATIVAN) injection 1 mg  1 mg IntraVENous Q15MIN PRN    glycopyrrolate (ROBINUL) injection 0.2 mg  0.2 mg IntraVENous Q4H PRN    fentaNYL citrate (PF) injection 25 mcg  25 mcg IntraVENous Q15MIN PRN    diphenoxylate-atropine (LOMOTIL) tablet 1 Tab  1 Tab Oral QID    LORazepam (ATIVAN) injection 1 mg  1 mg IntraVENous QHS    0.9% sodium chloride infusion  3 mL/hr IntraVENous CONTINUOUS    sodium chloride (NS) flush 20 mL  20 mL IntraVENous ONCE PRN    balsam peru-castor oil (VENELEX) [id number] mg/gram ointment   Topical BID    sodium chloride (NS) flush 5-10 mL  5-10 mL IntraVENous Q8H    sodium chloride (NS) flush 5-10 mL  5-10 mL IntraVENous PRN    HYDROmorphone (PF) (DILAUDID) injection 0.5 mg  0.5 mg IntraVENous Q4H PRN    acetaminophen (TYLENOL) tablet 650 mg  650 mg Oral Q4H PRN    HYDROcodone-acetaminophen (NORCO) 5-325 mg per tablet 1 Tab  1 Tab Oral Q4H PRN    ondansetron (ZOFRAN) injection 4 mg  4 mg IntraVENous Q6H PRN          LAB AND IMAGING FINDINGS:     Lab Results   Component Value Date/Time    WBC 11.3 02/27/2017 09:32 AM    HGB 11.7 02/27/2017 09:32 AM    PLATELET 648 99/60/5021 09:32 AM     Lab Results   Component Value Date/Time    Sodium 138 02/27/2017 09:32 AM    Potassium 3.1 02/27/2017 09:32 AM    Chloride 106 02/27/2017 09:32 AM    CO2 24 02/27/2017 09:32 AM    BUN 18 02/27/2017 09:32 AM    Creatinine 1.01 02/27/2017 09:32 AM    Calcium 7.6 02/27/2017 09:32 AM    Magnesium 2.1 02/26/2017 05:38 AM    Phosphorus 4.1 02/24/2017 03:17 AM      Lab Results   Component Value Date/Time    AST (SGOT) 93 02/27/2017 09:32 AM    Alk. phosphatase 213 02/27/2017 09:32 AM    Protein, total 5.5 02/27/2017 09:32 AM    Albumin 1.7 02/27/2017 09:32 AM    Globulin 3.8 02/27/2017 09:32 AM     Lab Results   Component Value Date/Time    INR 1.4 02/24/2017 03:17 AM    Prothrombin time 14.7 02/24/2017 03:17 AM    aPTT 25.1 02/22/2017 12:02 PM      No results found for: IRON, FE, TIBC, IBCT, PSAT, FERR   No results found for: PH, PCO2, PO2  No components found for: [place]   Lab Results   Component Value Date/Time     12/22/2016 09:29 PM    CK - MB 2.7 12/22/2016 09:29 PM                Total time: 35min  Counseling / coordination time: 20min  > 50% counseling / coordination?: y    Prolonged service was provided for  []30 min   []75 min in face to face time in the presence of the patient.   Time Start:   Time End:   Note: this can only be billed with 70909 (initial) or 47037 (follow up). If multiple start / stop times, list each separately.

## 2017-02-27 NOTE — PROGRESS NOTES
Primary Nurse Roxanna Gimenez RN  and GILDA Long performed a dual skin assessment on this patient Impairment noted- see wound doc flow sheet  Ashu score is 13. Blanchable redness to bilateral heels. Moisture and redness/possible skin tear noted under abdominal skin fold. Redness to groin and sacrum; skin tear to L buttocks. Patient able to turn from side to side with minimal assist. Daughter at bedside cutting and grooming patient's fingernails and toenails.

## 2017-02-27 NOTE — PROGRESS NOTES
1950 - Bedside and Verbal shift change report given to isidra (oncoming nurse) by Connor Wolf (offgoing nurse). Report included the following information SBAR, Kardex, Intake/Output, MAR, Recent Results and Cardiac Rhythm SR.   2147 - paged MD to report SBP in 70s. Talked to Dr. Abeba Calvo and updated on patient status. New order for levophed received. 2215 - patients /67.  2350 - Bedside and Verbal shift change report given to [facility] [facility] (oncoming nurse) by Larissa Arana (offgoing nurse).  Report included the following information SBAR, Kardex, Intake/Output, MAR, Recent Results and Cardiac Rhythm SR.

## 2017-02-27 NOTE — HOSPICE
Hospice Liaison RN  Discussed with Dr. Deneen Garcia. Reviewing  medical record with Dr. Nhan Fuchs,, Medical Director [facility].      Alfonzo Escoto RN, MSN  Hospice Liaison  [phone]

## 2017-02-27 NOTE — PROGRESS NOTES
Attended IDR in CVICU where this patient's condition and care were discussed. [facility]'s Staff  (Osei Silva Patient Care Specialist)   Paging Service [phone]([phone])

## 2017-02-27 NOTE — PROGRESS NOTES
5955 - Report received on patient. 1005 - Noted patient's rhythm was atrial fib at 150, vagal maneuvers done, HR decreased to baseline in the 90's. [address] paged to inform of lab results. [phone] - Paged Hospitalist again, awaiting call back. 1100 - Palliative in, meeting with family. 0 - [facility] volunteer in patient room, invited her to receive communion. 1146 - Noted orders from palliative MD, decreased O2 to 2 LPM nasal cannula. IV fluids and Levophed discontinued as ordered. [address]. Max Joseph in to see patient. 18 - Dr. Estephania Lanier in to see patient. 1600 - Central line dc'd.  1625 - TRANSFER - OUT REPORT:    Verbal report given to Carla Workman RN(name) on Latosha Acuña  being transferred to [facility](unit) for routine progression of care       Report consisted of patients Situation, Background, Assessment and   Recommendations(SBAR). Information from the following report(s) SBAR, Kardex, Intake/Output, MAR, Accordion and Cardiac Rhythm SR with rare paroxysmal atrial tachycardia was reviewed with the receiving nurse. Lines:   Peripheral IV 02/22/17 Left Hand (Active)   Site Assessment Clean, dry, & intact 2/27/2017  8:00 AM   Phlebitis Assessment 0 2/27/2017  8:00 AM   Infiltration Assessment 0 2/27/2017  8:00 AM   Dressing Status Clean, dry, & intact 2/27/2017  8:00 AM   Dressing Type Transparent;Tape 2/27/2017  8:00 AM   Hub Color/Line Status Pink; Infusing 2/27/2017  8:00 AM   Action Taken Open ports on tubing capped 2/27/2017  8:00 AM   Alcohol Cap Used Yes 2/27/2017  8:00 AM        Opportunity for questions and clarification was provided.       Patient transported with:   Monitor  O2 @ 2 liters  Patient-specific medications from Pharmacy  Registered Nurse

## 2017-02-27 NOTE — HOSPICE
[facility] HOSPICE MSW NOTE:    Hospcie consult, at the present time patient does not meet inpatient hospcie criteria. MSW had a lengthy meeting with daughter regarding disposition. Daughter is overwhelmed with patients care. Daughter works full time, and stated she cannot continue to care for patient in her home. Patient and her  lives with daughter. Daughter has 2 children living in the home and going to college. Per daughter patients  has heart issues. Daughter noted patient is immobile and is morbidly obese. Patient reports she is tired of her poor QOL and wants to [employer]. Patient suffers from depression and is not on medications. Daughter reports patient has had lifelong depression and has depressive symtoms even when on medication. Per daughter patient and  have limited income and are filing for bankruptcy. MSW and daughter discussed Medicaid application for patient. Daughter will apply for Medicaid for patient to have either Medicaid aides in the home and possible LTC benefits for patient. MSW and daughter discussed family dynamics with patients sons, all of whom live in [place]. Hospice will continue to follow and reassess for level of care. Thank-you for this referral and the opportunity to be involved in this patients care.     [address] [place] [address]  [phone]

## 2017-02-27 NOTE — PROGRESS NOTES
Follow up visit with patient and family in CVICU. Daughter Owens Merlin was feeding yogurt to Mrs. S during the visit. Mrs. Babar Kellogg stated that what she wanted to do most is to go home. However, Moe Pondes her head indicating that this is not possible. 2 sons from [place] are in town and at the hospital visiting.  Alexis Merlos looked somewhat detached --however he became more animated when conversation turned toward his son Patrica Estevez, who is the youngest son and in [place] right now. He apparently travels a good deal and last visited his mother in July. Staff indicated that a transition to hospice care is likely. However, Cholo Welch indicated that she had no immediate spiritual needs.   Provided assurance of prayer for Cholo Welch and her family and reminded of continued  availability upon request.    [address] [facility],Third Floor  816-[facility] ([phone])

## 2017-02-28 NOTE — CONSULTS
Palliative Medicine Consult  [place]: [phone] ([phone])    Patient Name: Andres Rogers  YOB: 1935    Date of Initial Consult: 2/23/17  Reason for Consult: care decisions  Requesting Provider: Dr. Per Perdue   Primary Care Physician: Jalen Rosas MD      SUMMARY:   Andres Rogers is a [de-identified] y.o. with a past history of PE/DVT/IVC, RV failure, pulmonary htn, chronic pain, athritis, who was admitted on 2/22/2017 from home with a diagnosis of SOB, recently stopped a/c, bilateral PE, sepsis, HARRIET. Current medical issues leading to Palliative Medicine involvement include: multiple acute medical issues, care decisions. PALLIATIVE DIAGNOSES:   1. Anxiety / depression - longstanding  2. Acute respiratory failure, Shortness of breath with exertion. 3. Diarrhea  4. Debility  5. Insomnia. 6. Goals of care are comfort. Patient does not want treatments other than focus on comfort. Patient requesting hospice care. PLAN:   1. Insomnia --, scheduled ativan 1mg QHS. This worked great for patient last night. She is thrilled this morning with the night sleep she had.  2. Dyspnea is controlled with oxygen. Patient does not want to get out of bed today. 3. Symptoms well managed at this time. She is at high risk for sudden decline. Might need LTC placement as she does not meet inpatient hospice criteria at this time. 4. We talked again today about risk of further clots to lungs, she does not want anticoagulation. She wants comfort only, if she has another clot and gets more short winded, we talked about managing symptoms with anxiety meds and oxygen.     5. Communicated plan of care with: Palliative IDT, bedside nurse, hospice team       GOALS OF CARE / TREATMENT PREFERENCES:   [====Goals of Care====]  GOALS OF CARE:  Patient / health care proxy stated goals: recover from current illness with current treatments, if possible; avoid aggressive care at end of life      TREATMENT PREFERENCES:   Code Status: DNR    Advance Care Planning:  Advance Care Planning 2/23/2017   Patient's Healthcare Decision Maker is: Named in scanned ACP document   Primary Decision Maker Name Ermelinda Solano   Primary Decision Maker Phone Number [phone]   Primary Decision Maker Relationship to Patient Adult child   Secondary Decision Maker Name Elizabeth Dowell   Secondary Decision Maker Relationship to Patient Spouse   Confirm Advance Directive Yes, on file   Does the patient have other document types Do Not Resuscitate       Other:    The palliative care team has discussed with patient / health care proxy about goals of care / treatment preferences for patient.  [====Goals of Care====]  Comfort only       HISTORY:     VSS, BP remains low. She has minimal urine output. Diarrhea resolving. HPI/SUBJECTIVE:    The patient is:   [x] Verbal and participatory  [] Non-participatory due to:   Pt denies pain, sob, nausea. \"what a great night!!\"   She is thrilled that she slept overnight with no one bothering her. She is not short of breath right now, but thinks she would be if she moved around. No desire to get OOB. She just wants to rest.  She had such great visits with her family yesterday. So nice to be unhooked from everything, she is much more comfortable. Not having any pain.       Clinical Pain Assessment (nonverbal scale for severity on nonverbal patients):   [++++ Clinical Pain Assessment++++]  [++++Pain Severity++++]: Pain: 0  [++++Pain Character++++]:   [++++Pain Duration++++]:   [++++Pain Effect++++]:   [++++Pain Factors++++]:   [++++Pain Frequency++++]:   [++++Pain Location++++]:   [++++ Clinical Pain Assessment++++]     FUNCTIONAL ASSESSMENT:     Palliative Performance Scale (PPS):  PPS: 30       PSYCHOSOCIAL/SPIRITUAL SCREENING:     Advance Care Planning:  Advance Care Planning 2/23/2017   Patient's Healthcare Decision Maker is: Named in scanned ACP document   Primary Decision Maker Name Ermelinda Solano Primary Decision Maker Phone Number [phone]   Primary Decision Maker Relationship to Patient Adult child   Secondary Decision Maker Name Nereida Dunn   Secondary Decision Maker Relationship to Patient Spouse   Confirm Advance Directive Yes, on file   Does the patient have other document types Do Not Resuscitate        Any spiritual / Yarsani concerns:  [] Yes /  [x] No    Caregiver Burnout:  [] Yes /  [x] No /  [] No Caregiver Present      Anticipatory grief assessment:   [x] Normal  / [] Maladaptive       ESAS Anxiety: Anxiety: 7    ESAS Depression: Depression: 7        REVIEW OF SYSTEMS:     Positive and pertinent negative findings in ROS are noted above in HPI. The following systems were [x] reviewed / [] unable to be reviewed as noted in HPI  Other findings are noted below. Systems: constitutional, ears/nose/mouth/throat, respiratory, gastrointestinal, genitourinary, musculoskeletal, integumentary, neurologic, psychiatric, endocrine. Positive findings noted below. Modified ESAS Completed by: provider   Fatigue: 9 Drowsiness: 1   Depression: 7 Pain: 0   Anxiety: 7 Nausea: 0   Anorexia: 0 Dyspnea: 0     Constipation: No     Stool Occurrence(s): 1        PHYSICAL EXAM:     From RN flowsheet:  Wt Readings from Last 3 Encounters:   02/28/17 252 lb 8.9 oz (114.6 kg)   12/28/16 229 lb 8 oz (104.1 kg)   10/26/16 229 lb 11.2 oz (104.2 kg)     Blood pressure 99/61, pulse 97, temperature 98 °F (36.7 °C), resp. rate 16, height 5' 2\" (1.575 m), weight 252 lb 8.9 oz (114.6 kg), SpO2 90 %. Pain Scale 1: Numeric (0 - 10)  Pain Intensity 1: 0  Pain Onset 1: \"lying in bed\"  Pain Location 1: Back  Pain Orientation 1: Lower  Pain Description 1: Aching  Pain Intervention(s) 1: Medication (see MAR)  Last bowel movement, if known:     Constitutional: aa, nad, fatigued  Leaning up in hospital bed drinking ensure.    No respiratory distress  Patient is pleasant and engaging  Dry mouth, able to eat ice chips without difficulty  Normal affect. Clear about her wishes. HISTORY:     Principal Problem:    Respiratory failure with hypoxia ([facility] (2/22/2017)    Active Problems:    RVF (right ventricular failure) ([facility] (7/14/2015)      History of pulmonary embolism (8/1/2016)      Hypertension, essential, benign (8/1/2016)      Morbid obesity with body mass index of 40.0-49.9 ([facility] (8/1/2016)      Hypotension (2/22/2017)      Past Medical History:   Diagnosis Date    After cataract, bilateral     Arthritis     Chronic pain     back pain, left leg pain    Hypertension     Hypertension, essential, benign 8/1/2016    Pulmonary emboli (HCC)     Pulmonary embolism ([facility] 7/14/2015    Pulmonary stenosis     [facility] 7/14/2015      Past Surgical History:   Procedure Laterality Date    ABDOMEN SURGERY PROC UNLISTED      cholecystectomy    HX GI      HX ORTHOPAEDIC      bilateral knee replacements      History reviewed. No pertinent family history. History reviewed, no pertinent family history.   Social History   Substance Use Topics    Smoking status: Former Smoker    Smokeless tobacco: Never Used    Alcohol use No     No Known Allergies   Current Facility-Administered Medications   Medication Dose Route Frequency    LORazepam (ATIVAN) tablet 1 mg  1 mg Oral QHS    LORazepam (ATIVAN) injection 1 mg  1 mg IntraVENous Q15MIN PRN    glycopyrrolate (ROBINUL) injection 0.2 mg  0.2 mg IntraVENous Q4H PRN    fentaNYL citrate (PF) injection 25 mcg  25 mcg IntraVENous Q15MIN PRN    0.9% sodium chloride infusion  3 mL/hr IntraVENous CONTINUOUS    sodium chloride (NS) flush 20 mL  20 mL IntraVENous ONCE PRN    balsam peru-castor oil (VENELEX)  mg/gram ointment   Topical BID    sodium chloride (NS) flush 5-10 mL  5-10 mL IntraVENous Q8H    sodium chloride (NS) flush 5-10 mL  5-10 mL IntraVENous PRN    HYDROmorphone (PF) (DILAUDID) injection 0.5 mg  0.5 mg IntraVENous Q4H PRN    acetaminophen (TYLENOL) tablet 650 mg  650 mg Oral Q4H PRN    HYDROcodone-acetaminophen (NORCO) 5-325 mg per tablet 1 Tab  1 Tab Oral Q4H PRN    ondansetron (ZOFRAN) injection 4 mg  4 mg IntraVENous Q6H PRN          LAB AND IMAGING FINDINGS:     Lab Results   Component Value Date/Time    WBC 11.3 02/27/2017 09:32 AM    HGB 11.7 02/27/2017 09:32 AM    PLATELET 392 69/67/9008 09:32 AM     Lab Results   Component Value Date/Time    Sodium 138 02/27/2017 09:32 AM    Potassium 3.1 02/27/2017 09:32 AM    Chloride 106 02/27/2017 09:32 AM    CO2 24 02/27/2017 09:32 AM    BUN 18 02/27/2017 09:32 AM    Creatinine 1.01 02/27/2017 09:32 AM    Calcium 7.6 02/27/2017 09:32 AM    Magnesium 2.1 02/26/2017 05:38 AM    Phosphorus 4.1 02/24/2017 03:17 AM      Lab Results   Component Value Date/Time    AST (SGOT) 93 02/27/2017 09:32 AM    Alk. phosphatase 213 02/27/2017 09:32 AM    Protein, total 5.5 02/27/2017 09:32 AM    Albumin 1.7 02/27/2017 09:32 AM    Globulin 3.8 02/27/2017 09:32 AM     Lab Results   Component Value Date/Time    INR 1.4 02/24/2017 03:17 AM    Prothrombin time 14.7 02/24/2017 03:17 AM    aPTT 25.1 02/22/2017 12:02 PM      No results found for: IRON, FE, TIBC, IBCT, PSAT, FERR   No results found for: PH, PCO2, PO2  No components found for: [place]   Lab Results   Component Value Date/Time     12/22/2016 09:29 PM    CK - MB 2.7 12/22/2016 09:29 PM                Total time  Counseling / coordination time  > 50% counseling / coordination?:     Prolonged service was provided for  []30 min   []75 min in face to face time in the presence of the patient. Time Start:   Time End:   Note: this can only be billed with 52991 (initial) or 14305 (follow up). If multiple start / stop times, list each separately.

## 2017-02-28 NOTE — PROGRESS NOTES
Spiritual Care Partner Volunteer visited patient in [address] on 2/28/17. Documented by:  Kali Melchor M.Div.    Paging Service [phone]

## 2017-02-28 NOTE — HOSPICE
[facility] HOSPICE MSW NOTE:    MSW spoke to [address] [facility] regarding patient and disposition as well as application for Medicaid. This MSW and Barney Manuel RN met with patient and her  to reassess for hospice services. Per floor RN Radha, patient did receive a dose of dilaudid and was resting. Patient does meet criteria for home hospice at this time. Today patient appeared more SOB, hospice will reassess patient for hospice in patient appropriateness. Thank-you for this referral and the opportunity to be involved in this patients care.     [address] [place] [address]  [phone]

## 2017-02-28 NOTE — PROGRESS NOTES
Bedside shift change report given to Brian Malloy (oncoming nurse) by Elaine Hurtado (offgoing nurse). Report included the following information SBAR.

## 2017-02-28 NOTE — WOUND CARE
WOCN Note:   New consult placed by RN for buttock and panus open areas; Chart reviewed prior to assessment;     Assessment:   Patient is alert, verbal, patient rated pain 0/10; Patient repositioned with max assist from right to supine on total care bed with heels floated; Pt tolerated assessment and procedure well. Able to palpate DP pulses bilaterally, feet warm, pink with good capillary refill;       Bilateral heel, right buttocks, and sacral skin intact and without erythema. Wound Assessment: present on admission. 1. Left buttock minute open area consistent with moisture/friction irritation measured 0.4cm x 0.3cm x 0.1cm, 100% pink, periwound skin darker pigment/hypopigment mix consistent with chronic moisture/friction irritation; zinc cream applied;   2. Left abdominal panus fold with several linear open pink areas consistent with moisture breakdown; zinc applied; Wound Care Recommendations:    1. Continue with venelex ointment as ordered and zinc cream in between; may tuck gauze in abdominal fold to absorb moisture;      Skin Care/Prevention Recommendations:  1. Continue continence checks; Apply venelex as ordered and zinc cream with each incontinent episode, use moisturizing cleanser foam to remove soiled surface layer and reapply zinc;   2. Reposition patient approximately every 2 hours. 3. Assess/protect skin in contact with medical devices. Keep skin moisturized. 4. Float Heels with pillow under calves. 5. Continue on total care bed for pressure redistribution;   6.  Ensure that patient is repositioning in chair shifting weight approximately every 15 minutes and standing up every hour;       Discussed above assessment and recommendations with Addison NICKERSON)    Thomas Galeazzi, RN, BSN, [facility]  Certified Wound, Ostomy, Continence Nurse  office [phone]  pager [facility]

## 2017-02-28 NOTE — PROGRESS NOTES
NUTRITION       Brief follow up. Chart reviewed, discussed with RN and team during interdisciplinary rounds. Plans to pursue comfort measures. Palliative Care following as well and pt is requesting hospice. Suspect aggressive nutrition intervention is not warranted at this time, but will gladly assist as needed. She is currently on full liquids and oral nutritional supplements. Please consult if this nutrition team can be of further assistance. Would continue pt preferred diet order.     [address]

## 2017-02-28 NOTE — PROGRESS NOTES
Hospitalist Progress Note  Felicity Gonzalez MD  Office: [phone]        Date of Service:  2017  NAME:  Gianna Tirado  :  1935  MRN:  [id number]      Admission Summary:   Gianna Tirado is a [de-identified] y.o. female with history of prior pulmonary embolism, pulmonary stenosis, RV failure, HTN, chronic pain, morbid obesity, prior left pleural effusion (?parapneumonic) who presents with shortness of breath x 5 days. She has had multiple recent admissions to [facility] in the past year, most recently seen -2016 for chest pain, where she was diagnosed with pneumonia. She had been discharged to home at that time and had reportedly been in her usual state of health until this weekend, when she had developed initially intermittent shortness of breath, which increased in frequency until she woke up this morning and had constant shortness of breath, which prompted her  to call EMS. She had additionally developed abdominal pain with nausea earlier this morning. She was reported to be hypoxic upon EMS arrival with O2 sat 68% on room air. She was placed on 10 liters nasal cannula and subsequently transported to [facility] ED for further evaluation.     She additionally reports subjective fever with measured temperature of T 99F on Friday prior to admission with chills. She denies chest pain, cough, congestion, sick contacts, recent changes in her medications, travel.     She has actually lost weight per her report. She denies any increasing lower extremity edema.      Of note, she does state that she had stopped her anticoagulation because she did not like how it had made her feel (she had been discharged on Xarelto in December). Interval history / Subjective:   She is lethargic.  at bedside,he said she was able to call him this morning. Assessment & Plan:     1.  Acute hypoxic respiratory failure  - secondary to multiple PEs;  - patient had stopped Xarelto  -2/27 she has decided to pursue comfort only care and anticoagulations stopped per palliative care team  -Hospice involved.     2. Hypotension  - Most likely due to sepsis;  - ? Volume depletion, ? Heart failure - would suspect RV failure  - started on pressors (Levophed and Syed);  - influenza negative  - blood cultures 2/22: no growth;  - urinalysis 2/22 - 10-20 WBCs, 2+ bacteria;  - urine culture: Klebsiella, pansusceptible;   - empiric coverage with Zosyn, vancomycin for now, received levofloxacin in ED  - 2/23: continue pressors;   - 2/24: remains on pressors; still hypotensive; poor urine output. - 2/25: no evidence of bacteremia or Gram positive infection: discontinue Vancomycin, especially since her kidneys are failing; continue Zosyn for now. Add steroids.     -2/27,all medications stopped. Comfort care.     3. Elevated BNP  - uncertain significance, she has some elements in her history consistent with heart failure (ie - nocturnal dyspnea becoming more progressive), but appears volume deplete based upon examination, ? RV failure  - Echo 12/23/16 - EF 55-60%, noted RVSP 50 mm Hg, RV systolic function moderately reduced, moderate hypokinesis of the basal-mid free wall and basal-mid diaphragmatic wall  - repeat Echocardiogram  - give IV fluids for now  - hold home atenolol  - cardiology consulted     4. Lactic acidosis:  - secondary to sepsis and volume contraction;  - received fluids. - blood culture: no growth x 2 days;     5. Elevated transaminases  -likely due to hypoperfusion.     6.  Abdominal pain  - wide differential could include cholecystitis vs mesenteric ischemia vs gastroenteritis, etc.  - lipase non-elevated  - pain control;  - CT abdo/pelvis: Hyperemia and straightening/narrowing of the left colon although without mural thickening, nonspecific, possibly inflammatory, infectious or ischemic.  - clinically most consistent with ischemia in the setting of hypotension and hypoperfusion;      7. Bilateral Pulmonary emboli:  - Anticoagulation stopped by palliative care team per pt wish     8. HTN     9. Hyponatremia: resolved;  - suspect this is hypovolemic hyponatremia even with her elevated BNP, will trial fluid hydration;    10. Acute Renal failure:  - Oliguric;   - She would not want dialysis. Hypothyroidism:  - increase dose of Synthroid;  - TSH 15.50.       Code status: DNR  DVT prophylaxis: Heparin    Care Plan discussed with: Patient/Family and Nurse  Disposition: TBD     Full comfort care. She is lethargic,was hypoxic,I think she is declining and may qualify inpatient hospice. Hospital Problems  Date Reviewed: 2/22/2017          Codes Class Noted POA    Hypotension ICD-10-CM: I95.9  ICD-9-CM: 458.9  2/22/2017 Yes        * (Principal)Respiratory failure with hypoxia [facility]) ICD-10-CM: J96.91  ICD-9-CM: 518.81  2/22/2017 Yes        History of pulmonary embolism ICD-10-CM: I78.050  ICD-9-CM: V12.55  8/1/2016 Yes        Hypertension, essential, benign ICD-10-CM: I10  ICD-9-CM: 401.1  8/1/2016 Yes        Morbid obesity with body mass index of 40.0-49.9 (HCC) ICD-10-CM: E66.01  ICD-9-CM: 278.01  8/1/2016 Yes        RVF (right ventricular failure) ([facility] [address] ICD-10-CM: I50.9  ICD-9-CM: 428.0  7/14/2015 Yes                Review of Systems:   Review of systems not obtained due to patient factors. Vital Signs:    Last 24hrs VS reviewed since prior progress note.  Most recent are:  Visit Vitals    BP (!) 88/59 (BP 1 Location: Left arm, BP Patient Position: At rest)    Pulse 98    Temp 97.5 °F (36.4 °C)    Resp 16    Ht 5' 2\" (1.575 m)    Wt 114.6 kg (252 lb 8.9 oz)    SpO2 90%    BMI 46.19 kg/m2         Intake/Output Summary (Last 24 hours) at 02/28/17 1817  Last data filed at 02/28/17 0617   Gross per 24 hour   Intake                0 ml   Output              350 ml   Net             -350 ml        Physical Examination:             Constitutional:  No acute distress, cooperative. ENT:  Oral mucous moist, oropharynx benign. Neck supple,    Resp:  CTA bilaterally. No wheezing/rhonchi/rales. No accessory muscle use   CV:  Regular rhythm, normal rate, no murmurs, gallops, rubs    GI:  Soft, non distended, non tender. normoactive bowel sounds;carvajal in place    Musculoskeletal:  +edema    Neurologic:  Moves all extremities. Answers questions appropriately     Skin:  warm and dry, but feet cold, and some distant mottling of the skin is noted;       Data Review:    Review and/or order of clinical lab test  Review and/or order of tests in the radiology section of CPT  Review and/or order of tests in the medicine section of CPT      Labs:     Recent Labs      02/27/17   0932   WBC  11.3*   HGB  11.7   HCT  33.8*   PLT  261     Recent Labs      02/27/17 0932  02/26/17   0538   NA  138  140   K  3.1*  3.4*   CL  106  108   CO2  24  20*   BUN  18  17   CREA  1.01  1.14*   GLU  102*  163*   CA  7.6*  7.8*   MG   --   2.1     Recent Labs      02/27/17   0932  02/26/17   0538   SGOT  93*  141*   ALT  81*  94*   AP  213*  233*   TBILI  0.7  0.6   TP  5.5*  5.5*   ALB  1.7*  1.6*   GLOB  3.8  3.9     No results for input(s): INR, PTP, APTT in the last 72 hours. No lab exists for component: INREXT, INREXT   No results for input(s): FE, TIBC, PSAT, FERR in the last 72 hours. No results found for: FOL, RBCF   No results for input(s): PH, PCO2, PO2 in the last 72 hours. No results for input(s): CPK, CKNDX, TROIQ in the last 72 hours.     No lab exists for component: CPKMB  Lab Results   Component Value Date/Time    Cholesterol, total 78 10/26/2016 03:55 AM    HDL Cholesterol 19 10/26/2016 03:55 AM    LDL, calculated 40.6 10/26/2016 03:55 AM    Triglyceride 92 10/26/2016 03:55 AM    CHOL/HDL Ratio 4.1 10/26/2016 03:55 AM     Lab Results   Component Value Date/Time    Glucose (POC) 121 10/25/2016 09:22 AM     Lab Results   Component Value Date/Time    Color DARK YELLOW 02/22/2017 12:02 PM Appearance CLOUDY 02/22/2017 12:02 PM    Specific gravity 1.020 02/22/2017 12:02 PM    pH (UA) 5.0 02/22/2017 12:02 PM    Protein NEGATIVE  02/22/2017 12:02 PM    Glucose NEGATIVE  02/22/2017 12:02 PM    Ketone NEGATIVE  02/22/2017 12:02 PM    Bilirubin NEGATIVE  12/22/2016 11:01 PM    Urobilinogen 1.0 02/22/2017 12:02 PM    Nitrites NEGATIVE  02/22/2017 12:02 PM    Leukocyte Esterase LARGE 02/22/2017 12:02 PM    Epithelial cells FEW 02/22/2017 12:02 PM    Bacteria 2+ 02/22/2017 12:02 PM    WBC 10-20 02/22/2017 12:02 PM    RBC 0-5 02/22/2017 12:02 PM         Medications Reviewed:     Current Facility-Administered Medications   Medication Dose Route Frequency    LORazepam (ATIVAN) tablet 1 mg  1 mg Oral QHS    HYDROcodone-acetaminophen (NORCO) 5-325 mg per tablet 2 Tab  2 Tab Oral Q6HWA    LORazepam (ATIVAN) injection 1 mg  1 mg IntraVENous Q15MIN PRN    glycopyrrolate (ROBINUL) injection 0.2 mg  0.2 mg IntraVENous Q4H PRN    fentaNYL citrate (PF) injection 25 mcg  25 mcg IntraVENous Q15MIN PRN    0.9% sodium chloride infusion  3 mL/hr IntraVENous CONTINUOUS    sodium chloride (NS) flush 20 mL  20 mL IntraVENous ONCE PRN    balsam peru-castor oil (VENELEX)  mg/gram ointment   Topical BID    sodium chloride (NS) flush 5-10 mL  5-10 mL IntraVENous Q8H    sodium chloride (NS) flush 5-10 mL  5-10 mL IntraVENous PRN    HYDROmorphone (PF) (DILAUDID) injection 0.5 mg  0.5 mg IntraVENous Q4H PRN    acetaminophen (TYLENOL) tablet 650 mg  650 mg Oral Q4H PRN    ondansetron (ZOFRAN) injection 4 mg  4 mg IntraVENous Q6H PRN     ______________________________________________________________________  EXPECTED LENGTH OF STAY: 4d 21h  ACTUAL LENGTH OF STAY:          6                 Ese Baxter MD

## 2017-02-28 NOTE — PROGRESS NOTES
Hospitalist Progress Note  Duane Pott, MD  Office: [phone]        Date of Service:  2017  NAME:  Virginia Reese  :  1935  MRN:  [id number]      Admission Summary:   Virginia Reese is a [de-identified] y.o. female with history of prior pulmonary embolism, pulmonary stenosis, RV failure, HTN, chronic pain, morbid obesity, prior left pleural effusion (?parapneumonic) who presents with shortness of breath x 5 days. She has had multiple recent admissions to [facility] in the past year, most recently seen -2016 for chest pain, where she was diagnosed with pneumonia. She had been discharged to home at that time and had reportedly been in her usual state of health until this weekend, when she had developed initially intermittent shortness of breath, which increased in frequency until she woke up this morning and had constant shortness of breath, which prompted her  to call EMS. She had additionally developed abdominal pain with nausea earlier this morning. She was reported to be hypoxic upon EMS arrival with O2 sat 68% on room air. She was placed on 10 liters nasal cannula and subsequently transported to [facility] ED for further evaluation.     She additionally reports subjective fever with measured temperature of T 99F on Friday prior to admission with chills. She denies chest pain, cough, congestion, sick contacts, recent changes in her medications, travel.     She has actually lost weight per her report. She denies any increasing lower extremity edema.      Of note, she does state that she had stopped her anticoagulation because she did not like how it had made her feel (she had been discharged on Xarelto in December). Interval history / Subjective:   No significant improvement in patient's condition. Still no urine output,she had made decisions to purse comfort as her goal of care.  at bedside. Assessment & Plan:     1. Acute hypoxic respiratory failure  - secondary to multiple PEs;  - patient had stopped Xarelto  -2/27 she has decided to pursue comfort only care and anticoagulations stopped per palliative care team  -Hospice involved.     2. Hypotension  - Most likely due to sepsis;  - ? Volume depletion, ? Heart failure - would suspect RV failure  - started on pressors (Levophed and Syed);  - influenza negative  - blood cultures 2/22: no growth;  - urinalysis 2/22 - 10-20 WBCs, 2+ bacteria;  - urine culture: Klebsiella, pansusceptible;   - empiric coverage with Zosyn, vancomycin for now, received levofloxacin in ED  - 2/23: continue pressors;   - 2/24: remains on pressors; still hypotensive; poor urine output. - 2/25: no evidence of bacteremia or Gram positive infection: discontinue Vancomycin, especially since her kidneys are failing; continue Zosyn for now. Add steroids.     -2/27,all medications stopped. Comfort care.     3. Elevated BNP  - uncertain significance, she has some elements in her history consistent with heart failure (ie - nocturnal dyspnea becoming more progressive), but appears volume deplete based upon examination, ? RV failure  - Echo 12/23/16 - EF 55-60%, noted RVSP 50 mm Hg, RV systolic function moderately reduced, moderate hypokinesis of the basal-mid free wall and basal-mid diaphragmatic wall  - repeat Echocardiogram  - give IV fluids for now  - hold home atenolol  - cardiology consulted     4. Lactic acidosis:  - secondary to sepsis and volume contraction;  - received fluids. - blood culture: no growth x 2 days;     5. Elevated transaminases  -likely due to hypoperfusion.     6.  Abdominal pain  - wide differential could include cholecystitis vs mesenteric ischemia vs gastroenteritis, etc.  - lipase non-elevated  - pain control;  - CT abdo/pelvis: Hyperemia and straightening/narrowing of the left colon although without mural thickening, nonspecific, possibly inflammatory, infectious or ischemic.  - clinically most consistent with ischemia in the setting of hypotension and hypoperfusion;      7. Bilateral Pulmonary emboli:  - Anticoagulation stopped by palliative care team per pt wish     8. HTN     9. Hyponatremia: resolved;  - suspect this is hypovolemic hyponatremia even with her elevated BNP, will trial fluid hydration;    10. Acute Renal failure:  - Oliguric;   - She would not want dialysis. Hypothyroidism:  - increase dose of Synthroid;  - TSH 15.50.       Code status: DNR  DVT prophylaxis: Heparin    Care Plan discussed with: Patient/Family and Nurse  Disposition: TBD     Full comfort care. Hospice as out patient,does not qualify for inpatient hospice. Hospital Problems  Date Reviewed: 2/22/2017          Codes Class Noted POA    Hypotension ICD-10-CM: I95.9  ICD-9-CM: 458.9  2/22/2017 Yes        * (Principal)Respiratory failure with hypoxia [facility]) ICD-10-CM: J96.91  ICD-9-CM: 518.81  2/22/2017 Yes        History of pulmonary embolism ICD-10-CM: A01.325  ICD-9-CM: V12.55  8/1/2016 Yes        Hypertension, essential, benign ICD-10-CM: I10  ICD-9-CM: 401.1  8/1/2016 Yes        Morbid obesity with body mass index of 40.0-49.9 (HCC) ICD-10-CM: E66.01  ICD-9-CM: 278.01  8/1/2016 Yes        RVF (right ventricular failure) ([facility] [address] ICD-10-CM: I50.9  ICD-9-CM: 428.0  7/14/2015 Yes                Review of Systems:   Review of systems not obtained due to patient factors. Vital Signs:    Last 24hrs VS reviewed since prior progress note.  Most recent are:  Visit Vitals    BP (!) 89/62 (BP 1 Location: Left arm, BP Patient Position: At rest)    Pulse 88    Temp 97.4 °F (36.3 °C)    Resp 19    Ht 5' 2\" (1.575 m)    Wt 112.7 kg (248 lb 7.3 oz)    SpO2 90%    BMI 45.44 kg/m2         Intake/Output Summary (Last 24 hours) at 02/27/17 2150  Last data filed at 02/27/17 1500   Gross per 24 hour   Intake          2220.39 ml   Output              900 ml   Net          1320.39 ml        Physical Examination:             Constitutional:  No acute distress, cooperative. ENT:  Oral mucous moist, oropharynx benign. Neck supple,    Resp:  CTA bilaterally. No wheezing/rhonchi/rales. No accessory muscle use   CV:  Regular rhythm, normal rate, no murmurs, gallops, rubs    GI:  Soft, non distended, non tender. normoactive bowel sounds;carvajal in place    Musculoskeletal:  +edema    Neurologic:  Moves all extremities. Answers questions appropriately     Skin:  warm and dry, but feet cold, and some distant mottling of the skin is noted;       Data Review:    Review and/or order of clinical lab test  Review and/or order of tests in the radiology section of CPT  Review and/or order of tests in the medicine section of CPT      Labs:     Recent Labs      02/27/17   0932  02/25/17   0330   WBC  11.3*  11.2*   HGB  11.7  12.1   HCT  33.8*  36.1   PLT  261  242     Recent Labs      02/27/17   0932  02/26/17   0538  02/25/17   0940  02/25/17   0330   NA  138  140  139  143   K  3.1*  3.4*  3.7  3.2*   CL  106  108  108  111*   CO2  24  20*  19*  20*   BUN  18  17  20  18   CREA  1.01  1.14*  1.26*  1.22*   GLU  102*  163*  134*  122*   CA  7.6*  7.8*  8.0*  7.7*   MG   --   2.1  1.5*  1.8     Recent Labs      02/27/17   0932  02/26/17   0538  02/25/17   0330   SGOT  93*  141*  213*   ALT  81*  94*  97*   AP  213*  233*  228*   TBILI  0.7  0.6  0.6   TP  5.5*  5.5*  5.4*   ALB  1.7*  1.6*  1.7*   GLOB  3.8  3.9  3.7     No results for input(s): INR, PTP, APTT in the last 72 hours. No lab exists for component: INREXT, INREXT   No results for input(s): FE, TIBC, PSAT, FERR in the last 72 hours. No results found for: FOL, RBCF   No results for input(s): PH, PCO2, PO2 in the last 72 hours. No results for input(s): CPK, CKNDX, TROIQ in the last 72 hours.     No lab exists for component: CPKMB  Lab Results   Component Value Date/Time    Cholesterol, total 78 10/26/2016 03:55 AM    HDL Cholesterol 19 10/26/2016 03:55 AM    LDL, calculated 40.6 10/26/2016 03:55 AM    Triglyceride 92 10/26/2016 03:55 AM    CHOL/HDL Ratio 4.1 10/26/2016 03:55 AM     Lab Results   Component Value Date/Time    Glucose (POC) 121 10/25/2016 09:22 AM     Lab Results   Component Value Date/Time    Color DARK YELLOW 02/22/2017 12:02 PM    Appearance CLOUDY 02/22/2017 12:02 PM    Specific gravity 1.020 02/22/2017 12:02 PM    pH (UA) 5.0 02/22/2017 12:02 PM    Protein NEGATIVE  02/22/2017 12:02 PM    Glucose NEGATIVE  02/22/2017 12:02 PM    Ketone NEGATIVE  02/22/2017 12:02 PM    Bilirubin NEGATIVE  12/22/2016 11:01 PM    Urobilinogen 1.0 02/22/2017 12:02 PM    Nitrites NEGATIVE  02/22/2017 12:02 PM    Leukocyte Esterase LARGE 02/22/2017 12:02 PM    Epithelial cells FEW 02/22/2017 12:02 PM    Bacteria 2+ 02/22/2017 12:02 PM    WBC 10-20 02/22/2017 12:02 PM    RBC 0-5 02/22/2017 12:02 PM         Medications Reviewed:     Current Facility-Administered Medications   Medication Dose Route Frequency    LORazepam (ATIVAN) injection 1 mg  1 mg IntraVENous Q15MIN PRN    glycopyrrolate (ROBINUL) injection 0.2 mg  0.2 mg IntraVENous Q4H PRN    fentaNYL citrate (PF) injection 25 mcg  25 mcg IntraVENous Q15MIN PRN    diphenoxylate-atropine (LOMOTIL) tablet 1 Tab  1 Tab Oral QID    LORazepam (ATIVAN) injection 1 mg  1 mg IntraVENous QHS    bacitracin 500 unit/gram packet        succinylcholine (ANECTINE) 20 mg/mL injection        0.9% sodium chloride infusion  3 mL/hr IntraVENous CONTINUOUS    sodium chloride (NS) flush 20 mL  20 mL IntraVENous ONCE PRN    balsam peru-castor oil (VENELEX)  mg/gram ointment   Topical BID    sodium chloride (NS) flush 5-10 mL  5-10 mL IntraVENous Q8H    sodium chloride (NS) flush 5-10 mL  5-10 mL IntraVENous PRN    HYDROmorphone (PF) (DILAUDID) injection 0.5 mg  0.5 mg IntraVENous Q4H PRN    acetaminophen (TYLENOL) tablet 650 mg  650 mg Oral Q4H PRN    HYDROcodone-acetaminophen (NORCO) 5-325 mg per tablet 1 Tab  1 Tab Oral Q4H PRN    ondansetron (ZOFRAN) injection 4 mg  4 mg IntraVENous Q6H PRN     ______________________________________________________________________  EXPECTED LENGTH OF STAY: 4d 21h  ACTUAL LENGTH OF STAY:          5                 Tessa Grimaldo MD

## 2017-02-28 NOTE — PROGRESS NOTES
Chart Reviewed:  Patient is a [de-identified]year old female with an admitting diagnosis of acute diastolic congestive heart failure. Patient has been consulted for palliative and hospice. CM spoke with patient's daughter, Elaina Franco [phone] to obtain needed information for assessment. Prior to hospitalization patient resided with her daughter, spouse, and 2 adult grandchildren in her daughter's home. Daughter reports that patient is wheel chair bound. Patient receives SS (1089.00) and snf pensions (129.45 and 26.46) as source of income. Daughter reported that patient is currently bankrupt. Patient utilizes [facility] for prescriptions. Mode of transport at time of discharge would be by ambulance. Patient's daughter is interested at this time in applying for LTC Medicaid for patient but is uncertain of where patient will discharge to. Daughter is experiencing anxiety and stress at this time and is looking for guidance with discharge planning. CM informed daughter that she would reach out to [employer] representative and request that they follow-up with her. CM will complete a UAI. CM contacted [facility] and spoke with Tanisha Kalilesliehetal to follow-up with patient's daughter. CM will continue to assist and further assess with disposition needs. RENAE was informed that Hospice to assess patient first thing in the morning. Care Management Interventions  PCP Verified by CM:  Yes  Palliative Care Consult (Criteria: CHF and RRAT>21): Yes  Mode of Transport at Discharge: BLS  Discharge Durable Medical Equipment: No  Physical Therapy Consult: No  Occupational Therapy Consult: No  Speech Therapy Consult: No  Current Support Network: Lives with Caregiver, Lives with Spouse (Reside in daughter's home )  Confirm Follow Up Transport: Other (see comment) (Daughter indicated that patient would need to be transported by ambulance at time of discharge.)  Plan discussed with Pt/Family/Caregiver: Yes  Freedom of Choice Offered: Yes  Discharge Location  Discharge Placement: Other: ([facility])    Rocio Cottoelor, MSW/CRM  4:19 PM

## 2017-02-28 NOTE — HOSPICE
Follow up on patient, discussed with GILDA Clark. Keri Clark reports oxygen levels have dropped into the 80's today but came up to 90 on 2 L/min O2 via NC. Patient with delayed responsiveness and lethargy. 1 PRN dose of dilaudid for complaints of pain within the last hour. Warm to touch with increased respiratory rate. No discharge plan yet. Patient does appear to be declining and may become in patient appropriate soon. Will reassess tomorrow. Discharge options/disposition discussed with KRISTA Marques with CM and patient spouse. Patient does meet routine LOC hospice criteria for protein calorie malnutirtion at this time.

## 2017-03-01 PROBLEM — R12 HEART BURN: Status: ACTIVE | Noted: 2017-01-01

## 2017-03-01 NOTE — PROGRESS NOTES
CM received a return call from patient's daughter, Ana Rolle. Daughter indicated that she had not heard anything from [employer] at this time. CM informed her that she would follow-up. CM also informed daughter that UAI had been started. Daughter is requesting a call from Attending to obtain an update on patient's status. CM informed Nurse of daughter's request.      CM inquired about potential options for placement/facilities. Daughter would like to review options and follow-up with CM on facilities that she is interested in tomorrow morning after she has had time to review. CM will continue to follow. CM did inform daughter that hospice SW would be reaching out to review other possible options. Daughter stated that she will be waiting to hear from worker. CM will continue to follow and assist with disposition needs.     RKISTA White/DIANE  3:57 PM

## 2017-03-01 NOTE — PROGRESS NOTES
Bedside shift change report given to [address] [place] [address] Neil (oncoming nurse) by Shawn Parry (offgoing nurse). Report included the following information SBAR, MAR, Recent Results and Med Rec Status.

## 2017-03-01 NOTE — PROGRESS NOTES
CM contacted [facility] with [employer] ([employer]) to obtain an on LTC Medicaid Screening. No one was present at the time of the call. CM left a message requesting follow-up. CM also contacted patient's daughter Siria Fournier [phone] to follow-up and review disposition concerns and needs. No one was present at the time of the call. CM left a message requesting a return call.     Kennard Severs, MSW/DIANE  2:57 PM

## 2017-03-01 NOTE — PROGRESS NOTES
Follow up visit with Halley De and  Radha Clark in Oncology. Daughter Roseann Whitehead was working today and not available to visit. Halley De stated that she is, \"doing as well as can be expected\" under the circumstances. Did not disclose anything more--appeared to be quite sleepy.  Radha Clark mentioned that pain medication seems to be causing her to sleep more. The couple did indicate that they desire ashes Geraldyne How Wednesday). Pedro Ulloa, [facility] , provided ashes later in the afternoon. Consulted with Hospice Nurse and Hospice SW prior to departing unit. Halley De is not yet a hospice patient.   Reminded of continued  availability upon request.    [address] [facility],Third Floor  [phone]

## 2017-03-01 NOTE — CONSULTS
Palliative Medicine Consult  Scar: [phone] ([phone])    Patient Name: Silvia Arellano  YOB: 1935    Date of Initial Consult: 2/23/17  Reason for Consult: care decisions  Requesting Provider: Dr. Luis Nicole   Primary Care Physician: Jose Felix MD      SUMMARY:   Silvia Arellano is a [de-identified] y.o. with a past history of PE/DVT/IVC, RV failure, pulmonary htn, chronic pain, athritis, who was admitted on 2/22/2017 from home with a diagnosis of SOB, recently stopped a/c, bilateral PE, sepsis, HARRIET. Current medical issues leading to Palliative Medicine involvement include: multiple acute medical issues, care decisions. PALLIATIVE DIAGNOSES:   1. Anxiety / depression - longstanding  2. Acute respiratory failure, Shortness of breath with exertion. 3. Diarrhea  4. Debility  5. Insomnia. 6. Goals of care are comfort. Patient does not want treatments other than focus on comfort. Patient requesting hospice care. PLAN:   1. Insomnia --, scheduled ativan 1mg QHS. This is working well. 2. Dyspnea is controlled with oxygen. Patient does not want to get out of bed  3. Symptoms well managed at this time. She is at high risk for sudden decline. Might need LTC placement as she does not meet inpatient hospice criteria at this time. 4. Heart burn- had tomato juice this morning and was lying flat, c/o heart burn. Will add prn Prilosec  5. We talked again today about risk of further clots to lungs, she does not want anticoagulation. She wants comfort only, if she has another clot and gets more short winded, we talked about managing symptoms with anxiety meds and oxygen.     6. Communicated plan of care with: Palliative IDT, bedside nurse, hospice team       GOALS OF CARE / TREATMENT PREFERENCES:   [====Goals of Care====]  GOALS OF CARE:  Patient / health care proxy stated goals: recover from current illness with current treatments, if possible; avoid aggressive care at end of life      TREATMENT PREFERENCES:   Code Status: DNR    Advance Care Planning:  Advance Care Planning 2/23/2017   Patient's Healthcare Decision Maker is: Named in scanned ACP document   Primary Decision Maker Name Francisco White   Primary Decision Maker Phone Number [phone]   Primary Decision Maker Relationship to Patient Adult child   Secondary Decision Maker Name Gabriel Bacon   Secondary Decision Maker Relationship to Patient Spouse   Confirm Advance Directive Yes, on file   Does the patient have other document types Do Not Resuscitate       Other:    The palliative care team has discussed with patient / health care proxy about goals of care / treatment preferences for patient.  [====Goals of Care====]  Comfort only       HISTORY:     VSS, BP remains low. She has minimal urine output. Diarrhea resolving. HPI/SUBJECTIVE:    The patient is:   [x] Verbal and participatory  [] Non-participatory due to:   Pt denies pain, sob, nausea. \"what a great night!!\"   She is thrilled that she slept overnight with no one bothering her. She is not short of breath right now, but thinks she would be if she moved around. No desire to get OOB. She just wants to rest.  She had such great visits with her family yesterday. So nice to be unhooked from everything, she is much more comfortable. Not having any pain.       Clinical Pain Assessment (nonverbal scale for severity on nonverbal patients):   [++++ Clinical Pain Assessment++++]  [++++Pain Severity++++]: Pain: 0  [++++Pain Character++++]:   [++++Pain Duration++++]:   [++++Pain Effect++++]:   [++++Pain Factors++++]:   [++++Pain Frequency++++]:   [++++Pain Location++++]:   [++++ Clinical Pain Assessment++++]     FUNCTIONAL ASSESSMENT:     Palliative Performance Scale (PPS):  PPS: 30       PSYCHOSOCIAL/SPIRITUAL SCREENING:     Advance Care Planning:  Advance Care Planning 2/23/2017   Patient's Healthcare Decision Maker is: Named in scanned ACP document   Primary Decision Maker Name Kat Gregory   Primary Decision Maker Phone Number [phone]   Primary Decision Maker Relationship to Patient Adult child   Secondary Decision Maker Name Duane Chatman   Secondary Decision Maker Relationship to Patient Spouse   Confirm Advance Directive Yes, on file   Does the patient have other document types Do Not Resuscitate        Any spiritual / Mu-ism concerns:  [] Yes /  [x] No    Caregiver Burnout:  [] Yes /  [x] No /  [] No Caregiver Present      Anticipatory grief assessment:   [x] Normal  / [] Maladaptive       ESAS Anxiety: Anxiety: 7    ESAS Depression: Depression: 7        REVIEW OF SYSTEMS:     Positive and pertinent negative findings in ROS are noted above in HPI. The following systems were [x] reviewed / [] unable to be reviewed as noted in HPI  Other findings are noted below. Systems: constitutional, ears/nose/mouth/throat, respiratory, gastrointestinal, genitourinary, musculoskeletal, integumentary, neurologic, psychiatric, endocrine. Positive findings noted below. Modified ESAS Completed by: provider   Fatigue: 9 Drowsiness: 1   Depression: 7 Pain: 0   Anxiety: 7 Nausea: 0   Anorexia: 0 Dyspnea: 0     Constipation: No     Stool Occurrence(s): 1        PHYSICAL EXAM:     From RN flowsheet:  Wt Readings from Last 3 Encounters:   03/01/17 253 lb 15.5 oz (115.2 kg)   12/28/16 229 lb 8 oz (104.1 kg)   10/26/16 229 lb 11.2 oz (104.2 kg)     Blood pressure 97/59, pulse 81, temperature 97.7 °F (36.5 °C), resp. rate 18, height 5' 2\" (1.575 m), weight 253 lb 15.5 oz (115.2 kg), SpO2 99 %. Pain Scale 1: Numeric (0 - 10)  Pain Intensity 1: 0  Pain Onset 1: \"lying in bed\"  Pain Location 1: Back  Pain Orientation 1: Lower  Pain Description 1: Aching  Pain Intervention(s) 1: Medication (see MAR)  Last bowel movement, if known:     Constitutional: aa, nad, fatigued  Leaning up in hospital bed drinking ensure.    No respiratory distress  Patient is pleasant and engaging  Dry mouth, able to eat ice chips without difficulty  Normal affect. Clear about her wishes. HISTORY:     Principal Problem:    Respiratory failure with hypoxia ([facility] (2/22/2017)    Active Problems:    RVF (right ventricular failure) ([facility] (7/14/2015)      History of pulmonary embolism (8/1/2016)      Hypertension, essential, benign (8/1/2016)      Morbid obesity with body mass index of 40.0-49.9 ([facility] (8/1/2016)      Hypotension (2/22/2017)      Past Medical History:   Diagnosis Date    After cataract, bilateral     Arthritis     Chronic pain     back pain, left leg pain    Hypertension     Hypertension, essential, benign 8/1/2016    Pulmonary emboli (HCC)     Pulmonary embolism ([facility] 7/14/2015    Pulmonary stenosis     [facility] 7/14/2015      Past Surgical History:   Procedure Laterality Date    ABDOMEN SURGERY PROC UNLISTED      cholecystectomy    HX GI      HX ORTHOPAEDIC      bilateral knee replacements      History reviewed. No pertinent family history. History reviewed, no pertinent family history.   Social History   Substance Use Topics    Smoking status: Former Smoker    Smokeless tobacco: Never Used    Alcohol use No     No Known Allergies   Current Facility-Administered Medications   Medication Dose Route Frequency    albuterol (PROVENTIL VENTOLIN) nebulizer solution 2.5 mg  2.5 mg Nebulization Q6H PRN    LORazepam (ATIVAN) tablet 1 mg  1 mg Oral QHS    HYDROcodone-acetaminophen (NORCO) 5-325 mg per tablet 2 Tab  2 Tab Oral Q6HWA    LORazepam (ATIVAN) injection 1 mg  1 mg IntraVENous Q15MIN PRN    glycopyrrolate (ROBINUL) injection 0.2 mg  0.2 mg IntraVENous Q4H PRN    fentaNYL citrate (PF) injection 25 mcg  25 mcg IntraVENous Q15MIN PRN    0.9% sodium chloride infusion  3 mL/hr IntraVENous CONTINUOUS    sodium chloride (NS) flush 20 mL  20 mL IntraVENous ONCE PRN    balsam peru-castor oil (VENELEX)  mg/gram ointment   Topical BID    sodium chloride (NS) flush 5-10 mL  5-10 mL IntraVENous Q8H    sodium chloride (NS) flush 5-10 mL  5-10 mL IntraVENous PRN    HYDROmorphone (PF) (DILAUDID) injection 0.5 mg  0.5 mg IntraVENous Q4H PRN    acetaminophen (TYLENOL) tablet 650 mg  650 mg Oral Q4H PRN    ondansetron (ZOFRAN) injection 4 mg  4 mg IntraVENous Q6H PRN          LAB AND IMAGING FINDINGS:     Lab Results   Component Value Date/Time    WBC 11.3 02/27/2017 09:32 AM    HGB 11.7 02/27/2017 09:32 AM    PLATELET 153 80/13/8215 09:32 AM     Lab Results   Component Value Date/Time    Sodium 138 02/27/2017 09:32 AM    Potassium 3.1 02/27/2017 09:32 AM    Chloride 106 02/27/2017 09:32 AM    CO2 24 02/27/2017 09:32 AM    BUN 18 02/27/2017 09:32 AM    Creatinine 1.01 02/27/2017 09:32 AM    Calcium 7.6 02/27/2017 09:32 AM    Magnesium 2.1 02/26/2017 05:38 AM    Phosphorus 4.1 02/24/2017 03:17 AM      Lab Results   Component Value Date/Time    AST (SGOT) 93 02/27/2017 09:32 AM    Alk. phosphatase 213 02/27/2017 09:32 AM    Protein, total 5.5 02/27/2017 09:32 AM    Albumin 1.7 02/27/2017 09:32 AM    Globulin 3.8 02/27/2017 09:32 AM     Lab Results   Component Value Date/Time    INR 1.4 02/24/2017 03:17 AM    Prothrombin time 14.7 02/24/2017 03:17 AM    aPTT 25.1 02/22/2017 12:02 PM      No results found for: IRON, FE, TIBC, IBCT, PSAT, FERR   No results found for: PH, PCO2, PO2  No components found for: [place]   Lab Results   Component Value Date/Time     12/22/2016 09:29 PM    CK - MB 2.7 12/22/2016 09:29 PM                Total time  Counseling / coordination time  > 50% counseling / coordination?:     Prolonged service was provided for  []30 min   []75 min in face to face time in the presence of the patient. Time Start:   Time End:   Note: this can only be billed with 67090 (initial) or 55471 (follow up). If multiple start / stop times, list each separately.

## 2017-03-01 NOTE — HOSPICE
[facility] RN NOTE    Per earlier referral received:  Reviewed chart, spoke with unit nurse, Servando and unit CM. Patient meets criteria for routine level of hospice care, however, not for inpatient hospice care at this time. Discussed finding with CM, who reports she is working on [employer]. We will plan to follow up on to re assess for hospice level of care should patien become inpatient hospice appropriate. Hospice nurse, Natasha Vieyra called daughter, Ady Gasca, to notify of review/visit finding.     Thank you for the opportunity    Almas Coronel RN BSN [facility]  [phone]

## 2017-03-01 NOTE — HOSPICE
[facility] HOSPICE MSW NOTE:    MSW spoke to [facility] regarding disposition and Medicaid application. MSW called daughter to schedule time to meet. Daughter was on phone with Medicaid and asked that I call her back. MSW will arrange time ti meet with daughter to further discuss disposition and how hospice can assist patient and family at this time. MSW will follow-up with CM once a meeting time has been arranged. Thank-you for this referral and the opportunity to be involved in this patients care.     [address] [place] [address]  [phone]

## 2017-03-02 NOTE — PROGRESS NOTES
CM spoke with Dr. Tia Allen and he had spoken with family. Preference given for [facility]. Referral sent via [employer]. CM will continue to follow. CLAIR Adams, CRM    5:02p  Referral sent to [facility] via [employer]. Facility is not accepting new admissions from the community at this time due to  gastro-intestinal virus within the facility. At this time, the facility is working to ensure proper infection control procedures to limit the spread of the virus. CM will inform assigned CM KRISTA Baer in the morning. CM will continue to follow.   CLAIR Adams, CRM

## 2017-03-02 NOTE — PROGRESS NOTES
CM received notification from [facility] with [employer] that patient did meet criteria for LTC Medicaid. Application to be submitted. CM will complete [employer]AI Screening and fax to [facility] once UAI has been successfully processed. CM contacted patients daughter to follow-up in regards to potential facility options. No one was present at the time of the call. CM left a message requesting a return call.     KRISTA Torres/DIANE  1:31 PM

## 2017-03-02 NOTE — HOSPICE
[facility] MSW NOTE:    This MSW spoke with RENAE Lee regarding disposition. Referral has been sent to [facility] for Medicaid LTC bed. MSW then called daughter Justino Hwang ([phone]) to answer any questions she may have about hospcie services. Daughter had questions about patients clinical status and asked to speak to Hospitalist. MSW paged Dr. Jason Manning and he will reach out to daughter to address her medical concerns. Daughter will be at bedside between 5:30 and 5:45 tonight. Per our conversation, daughter continues to be overwhelmed with patients care and reiterated she cannot care for patient in the home. MSW provided active listening. Thank-you for this referral and the opportunity to be involved in this patients care.     [address] [place] [address]  [phone]

## 2017-03-02 NOTE — PROGRESS NOTES
Hospitalist Progress Note  Marletta Cheadle, MD  Office: [phone]        Date of Service:  3/2/2017  NAME:  Tomás Butler  :  1935  MRN:  [id number]      Admission Summary:   Tomás Butler is a [de-identified] y.o. female with history of prior pulmonary embolism, pulmonary stenosis, RV failure, HTN, chronic pain, morbid obesity, prior left pleural effusion (?parapneumonic) who presents with shortness of breath x 5 days. She has had multiple recent admissions to [facility] in the past year, most recently seen -2016 for chest pain, where she was diagnosed with pneumonia. She had been discharged to home at that time and had reportedly been in her usual state of health until this weekend, when she had developed initially intermittent shortness of breath, which increased in frequency until she woke up this morning and had constant shortness of breath, which prompted her  to call EMS. She had additionally developed abdominal pain with nausea earlier this morning. She was reported to be hypoxic upon EMS arrival with O2 sat 68% on room air. She was placed on 10 liters nasal cannula and subsequently transported to [facility] ED for further evaluation.     She additionally reports subjective fever with measured temperature of T 99F on Friday prior to admission with chills. She denies chest pain, cough, congestion, sick contacts, recent changes in her medications, travel.     She has actually lost weight per her report. She denies any increasing lower extremity edema.      Of note, she does state that she had stopped her anticoagulation because she did not like how it had made her feel (she had been discharged on Xarelto in December). Interval history / Subjective:   Sleepy today.  at bedside. Assessment & Plan:     1.  Acute hypoxic respiratory failure  - secondary to multiple PEs;  - patient had stopped Xarelto  - she has decided to pursue comfort only care and anticoagulations stopped per palliative care team  -Hospice involved,family plan i discharge to a facility with hospice care not home.     2. Hypotension  - Most likely due to sepsis;  - ? Volume depletion, ? Heart failure - would suspect RV failure  - started on pressors (Levophed and Syed);  - influenza negative  - blood cultures 2/22: no growth;  - urinalysis 2/22 - 10-20 WBCs, 2+ bacteria;  - urine culture: Klebsiella, pansusceptible;   - empiric coverage with Zosyn, vancomycin for now, received levofloxacin in ED  - 2/23: continue pressors;   - 2/24: remains on pressors; still hypotensive; poor urine output. - 2/25: no evidence of bacteremia or Gram positive infection: discontinue Vancomycin, especially since her kidneys are failing; continue Zosyn for now. Add steroids.     -2/27,all medications stopped. Comfort care.     3. Elevated BNP  - uncertain significance, she has some elements in her history consistent with heart failure (ie - nocturnal dyspnea becoming more progressive), but appears volume deplete based upon examination, ? RV failure  - Echo 12/23/16 - EF 55-60%, noted RVSP 50 mm Hg, RV systolic function moderately reduced, moderate hypokinesis of the basal-mid free wall and basal-mid diaphragmatic wall  - repeat Echocardiogram  - give IV fluids for now  - hold home atenolol  - cardiology consulted     4. Lactic acidosis:  - secondary to sepsis and volume contraction;  - received fluids. - blood culture: no growth x 2 days;     5. Elevated transaminases  -likely due to hypoperfusion.     6.  Abdominal pain  - wide differential could include cholecystitis vs mesenteric ischemia vs gastroenteritis, etc.  - lipase non-elevated  - pain control;  - CT abdo/pelvis: Hyperemia and straightening/narrowing of the left colon although without mural thickening, nonspecific, possibly inflammatory, infectious or ischemic.  - clinically most consistent with ischemia in the setting of hypotension and hypoperfusion;      7. Bilateral Pulmonary emboli:  - Anticoagulation stopped by palliative care team per pt wish     8. HTN     9. Hyponatremia: resolved;  - suspect this is hypovolemic hyponatremia even with her elevated BNP, will trial fluid hydration;    10. Acute Renal failure:  - Oliguric;   - She would not want dialysis. Hypothyroidism:  - increase dose of Synthroid;  - TSH 15.50.       Code status: DNR  DVT prophylaxis: Heparin    Care Plan discussed with: Patient/Family and Nurse  Disposition: TBD     Full comfort care. she waxes and wanes,some day alert and other days sleepy. Hospital Problems  Date Reviewed: 2/22/2017          Codes Class Noted POA    Heart burn ICD-10-CM: R12  ICD-9-CM: 787.1  3/1/2017 Unknown        Hypotension ICD-10-CM: I95.9  ICD-9-CM: 458.9  2/22/2017 Yes        * (Principal)Respiratory failure with hypoxia ([facility] ICD-10-CM: J96.91  ICD-9-CM: 518.81  2/22/2017 Yes        History of pulmonary embolism ICD-10-CM: O23.718  ICD-9-CM: V12.55  8/1/2016 Yes        Hypertension, essential, benign ICD-10-CM: I10  ICD-9-CM: 401.1  8/1/2016 Yes        Morbid obesity with body mass index of 40.0-49.9 (HCC) ICD-10-CM: E66.01  ICD-9-CM: 278.01  8/1/2016 Yes        RVF (right ventricular failure) ([facility] ICD-10-CM: I50.9  ICD-9-CM: 428.0  7/14/2015 Yes                Review of Systems:   Review of systems not obtained due to patient factors. Vital Signs:    Last 24hrs VS reviewed since prior progress note.  Most recent are:  Visit Vitals    BP 94/58 (BP 1 Location: Left arm, BP Patient Position: At rest)    Pulse 93    Temp 97.9 °F (36.6 °C)    Resp 20    Ht 5' 2\" (1.575 m)    Wt 115.7 kg (255 lb 1.2 oz)    SpO2 94%    BMI 46.65 kg/m2         Intake/Output Summary (Last 24 hours) at 03/02/17 1706  Last data filed at 03/02/17 0834   Gross per 24 hour   Intake              490 ml   Output               50 ml   Net              440 ml        Physical Examination:             Constitutional:  Sleepy.  at bedside. ENT:  Oral mucous moist, oropharynx benign. Neck supple,    Resp:  CTA bilaterally. Some rattles. CV:  Regular rhythm, normal rate, no murmurs, gallops, rubs    GI:  Soft, non distended, non tender. normoactive bowel sounds;carvajal in place    Musculoskeletal:  +edema    Neurologic:  Moves all extremities. Answers questions appropriately     Skin:  warm and dry, but feet cold, and some distant mottling of the skin is noted;       Data Review:    Review and/or order of clinical lab test  Review and/or order of tests in the radiology section of CPT  Review and/or order of tests in the medicine section of CPT      Labs:     No results for input(s): WBC, HGB, HCT, PLT, HGBEXT, HCTEXT, PLTEXT, HGBEXT, HCTEXT, PLTEXT in the last 72 hours. No results for input(s): NA, K, CL, CO2, BUN, CREA, GLU, CA, MG, PHOS, URICA in the last 72 hours. No results for input(s): SGOT, GPT, ALT, AP, TBIL, TBILI, TP, ALB, GLOB, GGT, AML, LPSE in the last 72 hours. No lab exists for component: AMYP, HLPSE  No results for input(s): INR, PTP, APTT in the last 72 hours. No lab exists for component: INREXT, INREXT   No results for input(s): FE, TIBC, PSAT, FERR in the last 72 hours. No results found for: FOL, RBCF   No results for input(s): PH, PCO2, PO2 in the last 72 hours. No results for input(s): CPK, CKNDX, TROIQ in the last 72 hours.     No lab exists for component: CPKMB  Lab Results   Component Value Date/Time    Cholesterol, total 78 10/26/2016 03:55 AM    HDL Cholesterol 19 10/26/2016 03:55 AM    LDL, calculated 40.6 10/26/2016 03:55 AM    Triglyceride 92 10/26/2016 03:55 AM    CHOL/HDL Ratio 4.1 10/26/2016 03:55 AM     Lab Results   Component Value Date/Time    Glucose (POC) 121 10/25/2016 09:22 AM     Lab Results   Component Value Date/Time    Color DARK YELLOW 02/22/2017 12:02 PM    Appearance CLOUDY 02/22/2017 12:02 PM    Specific gravity 1.020 02/22/2017 12:02 PM    pH (UA) 5.0 02/22/2017 12:02 PM    Protein NEGATIVE  02/22/2017 12:02 PM    Glucose NEGATIVE  02/22/2017 12:02 PM    Ketone NEGATIVE  02/22/2017 12:02 PM    Bilirubin NEGATIVE  12/22/2016 11:01 PM    Urobilinogen 1.0 02/22/2017 12:02 PM    Nitrites NEGATIVE  02/22/2017 12:02 PM    Leukocyte Esterase LARGE 02/22/2017 12:02 PM    Epithelial cells FEW 02/22/2017 12:02 PM    Bacteria 2+ 02/22/2017 12:02 PM    WBC 10-20 02/22/2017 12:02 PM    RBC 0-5 02/22/2017 12:02 PM         Medications Reviewed:     Current Facility-Administered Medications   Medication Dose Route Frequency    albuterol (PROVENTIL VENTOLIN) nebulizer solution 2.5 mg  2.5 mg Nebulization Q6H PRN    pantoprazole (PROTONIX) tablet 40 mg  40 mg Oral ACB    LORazepam (ATIVAN) tablet 1 mg  1 mg Oral QHS    HYDROcodone-acetaminophen (NORCO) 5-325 mg per tablet 2 Tab  2 Tab Oral Q6HWA    LORazepam (ATIVAN) injection 1 mg  1 mg IntraVENous Q15MIN PRN    glycopyrrolate (ROBINUL) injection 0.2 mg  0.2 mg IntraVENous Q4H PRN    fentaNYL citrate (PF) injection 25 mcg  25 mcg IntraVENous Q15MIN PRN    0.9% sodium chloride infusion  3 mL/hr IntraVENous CONTINUOUS    sodium chloride (NS) flush 20 mL  20 mL IntraVENous ONCE PRN    balsam peru-castor oil (VENELEX)  mg/gram ointment   Topical BID    sodium chloride (NS) flush 5-10 mL  5-10 mL IntraVENous Q8H    sodium chloride (NS) flush 5-10 mL  5-10 mL IntraVENous PRN    HYDROmorphone (PF) (DILAUDID) injection 0.5 mg  0.5 mg IntraVENous Q4H PRN    acetaminophen (TYLENOL) tablet 650 mg  650 mg Oral Q4H PRN    ondansetron (ZOFRAN) injection 4 mg  4 mg IntraVENous Q6H PRN     ______________________________________________________________________  EXPECTED LENGTH OF STAY: 4d 21h  ACTUAL LENGTH OF STAY:          8                 Duane Pott, MD

## 2017-03-02 NOTE — CONSULTS
Palliative Medicine Consult  Scar: [phone] ([phone])    Patient Name: Ning Ashton  YOB: 1935    Date of Initial Consult: 2/23/17  Reason for Consult: care decisions  Requesting Provider: Dr. Marva Meigs   Primary Care Physician: Bhavik Price MD      SUMMARY:   Ning Ashton is a [de-identified] y.o. with a past history of PE/DVT/IVC, RV failure, pulmonary htn, chronic pain, athritis, who was admitted on 2/22/2017 from home with a diagnosis of SOB, recently stopped a/c, bilateral PE, sepsis, HARRIET. Current medical issues leading to Palliative Medicine involvement include: multiple acute medical issues, care decisions. PALLIATIVE DIAGNOSES:   1. Anxiety / depression - longstanding  2. Acute respiratory failure, Shortness of breath with exertion. 3. Diarrhea  4. Debility  5. Insomnia. 6. Goals of care are comfort. Patient does not want treatments other than focus on comfort. Patient requesting hospice care. PLAN:   1. Insomnia --, scheduled ativan 1mg QHS. This is working well. 2. Dyspnea is controlled with oxygen. Patient does not want to get out of bed  3. Symptoms well managed at this time. She is at high risk for sudden decline. Might need LTC placement as she does not meet inpatient hospice criteria at this time. 4. Heart burn- had tomato juice this morning and was lying flat, c/o heart burn. Will add prn Prilosec  5. We talked again today about risk of further clots to lungs, she does not want anticoagulation. She wants comfort only, if she has another clot and gets more short winded, we talked about managing symptoms with anxiety meds and oxygen. 6. Communicated plan of care with: Palliative IDT, bedside nurse, hospice team    Palliative team will sign off for now. Please feel free to re consult us if there is a decline in patient's condition. Hospice continues to follow patient.        GOALS OF CARE / TREATMENT PREFERENCES:   [====Goals of Care====]  GOALS OF CARE:  Patient / health care proxy stated goals: recover from current illness with current treatments, if possible; avoid aggressive care at end of life      TREATMENT PREFERENCES:   Code Status: DNR    Advance Care Planning:  Advance Care Planning 2/23/2017   Patient's Healthcare Decision Maker is: Named in scanned ACP document   Primary Decision Maker Name Boris Padilla   Primary Decision Maker Phone Number [phone]   Primary Decision Maker Relationship to Patient Adult child   Secondary Decision Maker Name Alverto Hidalgo   Secondary Decision Maker Relationship to Patient Spouse   Confirm Advance Directive Yes, on file   Does the patient have other document types Do Not Resuscitate       Other:    The palliative care team has discussed with patient / health care proxy about goals of care / treatment preferences for patient.  [====Goals of Care====]  Comfort only       HISTORY:     VSS, BP remains low. She has minimal urine output. Diarrhea resolving. HPI/SUBJECTIVE:    The patient is:   [x] Verbal and participatory  [] Non-participatory due to:   Pt denies pain, sob, nausea. \"what a great night!!\"   She is thrilled that she slept overnight with no one bothering her. She is not short of breath right now, but thinks she would be if she moved around. No desire to get OOB. She just wants to rest.  She had such great visits with her family yesterday. So nice to be unhooked from everything, she is much more comfortable. Not having any pain.       Clinical Pain Assessment (nonverbal scale for severity on nonverbal patients):   [++++ Clinical Pain Assessment++++]  [++++Pain Severity++++]: Pain: 0  [++++Pain Character++++]:   [++++Pain Duration++++]:   [++++Pain Effect++++]:   [++++Pain Factors++++]:   [++++Pain Frequency++++]:   [++++Pain Location++++]:   [++++ Clinical Pain Assessment++++]     FUNCTIONAL ASSESSMENT:     Palliative Performance Scale (PPS):  PPS: 30       PSYCHOSOCIAL/SPIRITUAL SCREENING:     Advance Care Planning:  Advance Care Planning 2/23/2017   Patient's Healthcare Decision Maker is: Named in scanned ACP document   Primary Decision Maker Name Kierra Guo   Primary Decision Maker Phone Number [phone]   Primary Decision Maker Relationship to Patient Adult child   Secondary Decision Maker Name Alicja Joy   Secondary Decision Maker Relationship to Patient Spouse   Confirm Advance Directive Yes, on file   Does the patient have other document types Do Not Resuscitate        Any spiritual / [employer] concerns:  [] Yes /  [x] No    Caregiver Burnout:  [] Yes /  [x] No /  [] No Caregiver Present      Anticipatory grief assessment:   [x] Normal  / [] Maladaptive       ESAS Anxiety: Anxiety: 7    ESAS Depression: Depression: 7        REVIEW OF SYSTEMS:     Positive and pertinent negative findings in ROS are noted above in HPI. The following systems were [x] reviewed / [] unable to be reviewed as noted in HPI  Other findings are noted below. Systems: constitutional, ears/nose/mouth/throat, respiratory, gastrointestinal, genitourinary, musculoskeletal, integumentary, neurologic, psychiatric, endocrine. Positive findings noted below. Modified ESAS Completed by: provider   Fatigue: 9 Drowsiness: 1   Depression: 7 Pain: 0   Anxiety: 7 Nausea: 0   Anorexia: 0 Dyspnea: 0     Constipation: No     Stool Occurrence(s): 1        PHYSICAL EXAM:     From RN flowsheet:  Wt Readings from Last 3 Encounters:   03/02/17 255 lb 1.2 oz (115.7 kg)   12/28/16 229 lb 8 oz (104.1 kg)   10/26/16 229 lb 11.2 oz (104.2 kg)     Blood pressure 107/72, pulse 88, temperature 97.9 °F (36.6 °C), resp. rate 18, height 5' 2\" (1.575 m), weight 255 lb 1.2 oz (115.7 kg), SpO2 97 %.     Pain Scale 1: Numeric (0 - 10)  Pain Intensity 1: 0  Pain Onset 1: \"lying in bed\"  Pain Location 1: Back  Pain Orientation 1: Lower  Pain Description 1: Aching  Pain Intervention(s) 1: Medication (see MAR)  Last bowel movement, if known:     Constitutional: aa, nad, fatigued  Leaning up in hospital bed drinking ensure. No respiratory distress  Patient is pleasant and engaging  Dry mouth, able to eat ice chips without difficulty  Normal affect. Clear about her wishes. HISTORY:     Principal Problem:    Respiratory failure with hypoxia ([address] (2/22/2017)    Active Problems:    RVF (right ventricular failure) ([address] (7/14/2015)      History of pulmonary embolism (8/1/2016)      Hypertension, essential, benign (8/1/2016)      Morbid obesity with body mass index of 40.0-49.9 ([address] (8/1/2016)      Hypotension (2/22/2017)      Heart burn (3/1/2017)      Past Medical History:   Diagnosis Date    After cataract, bilateral     Arthritis     Chronic pain     back pain, left leg pain    Hypertension     Hypertension, essential, benign 8/1/2016    Pulmonary emboli (HCC)     Pulmonary embolism ([address] 7/14/2015    Pulmonary stenosis     RVF (right ventricular failure) ([address] 7/14/2015      Past Surgical History:   Procedure Laterality Date    ABDOMEN SURGERY PROC UNLISTED      cholecystectomy    HX GI      HX ORTHOPAEDIC      bilateral knee replacements      History reviewed. No pertinent family history. History reviewed, no pertinent family history.   Social History   Substance Use Topics    Smoking status: Former Smoker    Smokeless tobacco: Never Used    Alcohol use No     No Known Allergies   Current Facility-Administered Medications   Medication Dose Route Frequency    albuterol (PROVENTIL VENTOLIN) nebulizer solution 2.5 mg  2.5 mg Nebulization Q6H PRN    pantoprazole (PROTONIX) tablet 40 mg  40 mg Oral ACB    LORazepam (ATIVAN) tablet 1 mg  1 mg Oral QHS    HYDROcodone-acetaminophen (NORCO) 5-325 mg per tablet 2 Tab  2 Tab Oral Q6HWA    LORazepam (ATIVAN) injection 1 mg  1 mg IntraVENous Q15MIN PRN    glycopyrrolate (ROBINUL) injection 0.2 mg  0.2 mg IntraVENous Q4H PRN    fentaNYL citrate (PF) injection 25 mcg  25 mcg IntraVENous Q15MIN PRN    0.9% sodium chloride infusion  3 mL/hr IntraVENous CONTINUOUS    sodium chloride (NS) flush 20 mL  20 mL IntraVENous ONCE PRN    balsam peru-castor oil (VENELEX)  mg/gram ointment   Topical BID    sodium chloride (NS) flush 5-10 mL  5-10 mL IntraVENous Q8H    sodium chloride (NS) flush 5-10 mL  5-10 mL IntraVENous PRN    HYDROmorphone (PF) (DILAUDID) injection 0.5 mg  0.5 mg IntraVENous Q4H PRN    acetaminophen (TYLENOL) tablet 650 mg  650 mg Oral Q4H PRN    ondansetron (ZOFRAN) injection 4 mg  4 mg IntraVENous Q6H PRN          LAB AND IMAGING FINDINGS:     Lab Results   Component Value Date/Time    WBC 11.3 02/27/2017 09:32 AM    HGB 11.7 02/27/2017 09:32 AM    PLATELET 886 59/08/8938 09:32 AM     Lab Results   Component Value Date/Time    Sodium 138 02/27/2017 09:32 AM    Potassium 3.1 02/27/2017 09:32 AM    Chloride 106 02/27/2017 09:32 AM    CO2 24 02/27/2017 09:32 AM    BUN 18 02/27/2017 09:32 AM    Creatinine 1.01 02/27/2017 09:32 AM    Calcium 7.6 02/27/2017 09:32 AM    Magnesium 2.1 02/26/2017 05:38 AM    Phosphorus 4.1 02/24/2017 03:17 AM      Lab Results   Component Value Date/Time    AST (SGOT) 93 02/27/2017 09:32 AM    Alk. phosphatase 213 02/27/2017 09:32 AM    Protein, total 5.5 02/27/2017 09:32 AM    Albumin 1.7 02/27/2017 09:32 AM    Globulin 3.8 02/27/2017 09:32 AM     Lab Results   Component Value Date/Time    INR 1.4 02/24/2017 03:17 AM    Prothrombin time 14.7 02/24/2017 03:17 AM    aPTT 25.1 02/22/2017 12:02 PM      No results found for: IRON, FE, TIBC, IBCT, PSAT, FERR   No results found for: PH, PCO2, PO2  No components found for: [place]   Lab Results   Component Value Date/Time     12/22/2016 09:29 PM    CK - MB 2.7 12/22/2016 09:29 PM                Total time  Counseling / coordination time  > 50% counseling / coordination?:     Prolonged service was provided for  []30 min   []75 min in face to face time in the presence of the patient.   Time Start: Time End:   Note: this can only be billed with 46976 (initial) or [id number] (follow up). If multiple start / stop times, list each separately.

## 2017-03-02 NOTE — PROGRESS NOTES
Hospitalist Progress Note  Sandra Syed MD  Office: [phone]        Date of Service:  3/1/2017  NAME:  Lisha Mercado  :  1935  MRN:  [id number]      Admission Summary:   Lisha Mercado is a [de-identified] y.o. female with history of prior pulmonary embolism, pulmonary stenosis, RV failure, HTN, chronic pain, morbid obesity, prior left pleural effusion (?parapneumonic) who presents with shortness of breath x 5 days. She has had multiple recent admissions to [facility] in the past year, most recently seen -2016 for chest pain, where she was diagnosed with pneumonia. She had been discharged to home at that time and had reportedly been in her usual state of health until this weekend, when she had developed initially intermittent shortness of breath, which increased in frequency until she woke up this morning and had constant shortness of breath, which prompted her  to call EMS. She had additionally developed abdominal pain with nausea earlier this morning. She was reported to be hypoxic upon EMS arrival with O2 sat 68% on room air. She was placed on 10 liters nasal cannula and subsequently transported to [facility] ED for further evaluation.     She additionally reports subjective fever with measured temperature of T 99F on Friday prior to admission with chills. She denies chest pain, cough, congestion, sick contacts, recent changes in her medications, travel.     She has actually lost weight per her report. She denies any increasing lower extremity edema.      Of note, she does state that she had stopped her anticoagulation because she did not like how it had made her feel (she had been discharged on Xarelto in December). Interval history / Subjective:   Alert and interactive today.  at bedside. Family,CM,Hospice working on [employer]. Assessment & Plan:     1.  Acute hypoxic respiratory failure  - secondary to multiple PEs;  - patient had stopped Xarelto  -2/27 she has decided to pursue comfort only care and anticoagulations stopped per palliative care team  -Hospice involved.     2. Hypotension  - Most likely due to sepsis;  - ? Volume depletion, ? Heart failure - would suspect RV failure  - started on pressors (Levophed and Syed);  - influenza negative  - blood cultures 2/22: no growth;  - urinalysis 2/22 - 10-20 WBCs, 2+ bacteria;  - urine culture: Klebsiella, pansusceptible;   - empiric coverage with Zosyn, vancomycin for now, received levofloxacin in ED  - 2/23: continue pressors;   - 2/24: remains on pressors; still hypotensive; poor urine output. - 2/25: no evidence of bacteremia or Gram positive infection: discontinue Vancomycin, especially since her kidneys are failing; continue Zosyn for now. Add steroids.     -2/27,all medications stopped. Comfort care.     3. Elevated BNP  - uncertain significance, she has some elements in her history consistent with heart failure (ie - nocturnal dyspnea becoming more progressive), but appears volume deplete based upon examination, ? RV failure  - Echo 12/23/16 - EF 55-60%, noted RVSP 50 mm Hg, RV systolic function moderately reduced, moderate hypokinesis of the basal-mid free wall and basal-mid diaphragmatic wall  - repeat Echocardiogram  - give IV fluids for now  - hold home atenolol  - cardiology consulted     4. Lactic acidosis:  - secondary to sepsis and volume contraction;  - received fluids. - blood culture: no growth x 2 days;     5. Elevated transaminases  -likely due to hypoperfusion.     6.  Abdominal pain  - wide differential could include cholecystitis vs mesenteric ischemia vs gastroenteritis, etc.  - lipase non-elevated  - pain control;  - CT abdo/pelvis: Hyperemia and straightening/narrowing of the left colon although without mural thickening, nonspecific, possibly inflammatory, infectious or ischemic.  - clinically most consistent with ischemia in the setting of hypotension and hypoperfusion;      7. Bilateral Pulmonary emboli:  - Anticoagulation stopped by palliative care team per pt wish     8. HTN     9. Hyponatremia: resolved;  - suspect this is hypovolemic hyponatremia even with her elevated BNP, will trial fluid hydration;    10. Acute Renal failure:  - Oliguric;   - She would not want dialysis. Hypothyroidism:  - increase dose of Synthroid;  - TSH 15.50.       Code status: DNR  DVT prophylaxis: Heparin    Care Plan discussed with: Patient/Family and Nurse  Disposition: TBD     Full comfort care. she is more alert and interactive today. Dispo planning. Hospital Problems  Date Reviewed: 2/22/2017          Codes Class Noted POA    Heart burn ICD-10-CM: R12  ICD-9-CM: 787.1  3/1/2017 Unknown        Hypotension ICD-10-CM: I95.9  ICD-9-CM: 458.9  2/22/2017 Yes        * (Principal)Respiratory failure with hypoxia ([facility] ICD-10-CM: J96.91  ICD-9-CM: 518.81  2/22/2017 Yes        History of pulmonary embolism ICD-10-CM: O17.928  ICD-9-CM: V12.55  8/1/2016 Yes        Hypertension, essential, benign ICD-10-CM: I10  ICD-9-CM: 401.1  8/1/2016 Yes        Morbid obesity with body mass index of 40.0-49.9 (HCC) ICD-10-CM: E66.01  ICD-9-CM: 278.01  8/1/2016 Yes        RVF (right ventricular failure) ([facility] ICD-10-CM: I50.9  ICD-9-CM: 428.0  7/14/2015 Yes                Review of Systems:   Review of systems not obtained due to patient factors. Vital Signs:    Last 24hrs VS reviewed since prior progress note. Most recent are:  Visit Vitals    /70    Pulse 85    Temp 98.2 °F (36.8 °C)    Resp 20    Ht 5' 2\" (1.575 m)    Wt 115.2 kg (253 lb 15.5 oz)    SpO2 90%    BMI 46.45 kg/m2         Intake/Output Summary (Last 24 hours) at 03/01/17 2052  Last data filed at 03/1935   Gross per 24 hour   Intake              720 ml   Output              600 ml   Net              120 ml        Physical Examination:             Constitutional:  No acute distress, cooperative. ENT:  Oral mucous moist, oropharynx benign. Neck supple,    Resp:  CTA bilaterally. No wheezing/rhonchi/rales. No accessory muscle use   CV:  Regular rhythm, normal rate, no murmurs, gallops, rubs    GI:  Soft, non distended, non tender. normoactive bowel sounds;carvajal in place    Musculoskeletal:  +edema    Neurologic:  Moves all extremities. Answers questions appropriately     Skin:  warm and dry, but feet cold, and some distant mottling of the skin is noted;       Data Review:    Review and/or order of clinical lab test  Review and/or order of tests in the radiology section of CPT  Review and/or order of tests in the medicine section of CPT      Labs:     Recent Labs      02/27/17   0932   WBC  11.3*   HGB  11.7   HCT  33.8*   PLT  261     Recent Labs      02/27/17   0932   NA  138   K  3.1*   CL  106   CO2  24   BUN  18   CREA  1.01   GLU  102*   CA  7.6*     Recent Labs      02/27/17   0932   SGOT  93*   ALT  81*   AP  213*   TBILI  0.7   TP  5.5*   ALB  1.7*   GLOB  3.8     No results for input(s): INR, PTP, APTT in the last 72 hours. No lab exists for component: INREXT, INREXT   No results for input(s): FE, TIBC, PSAT, FERR in the last 72 hours. No results found for: FOL, RBCF   No results for input(s): PH, PCO2, PO2 in the last 72 hours. No results for input(s): CPK, CKNDX, TROIQ in the last 72 hours.     No lab exists for component: CPKMB  Lab Results   Component Value Date/Time    Cholesterol, total 78 10/26/2016 03:55 AM    HDL Cholesterol 19 10/26/2016 03:55 AM    LDL, calculated 40.6 10/26/2016 03:55 AM    Triglyceride 92 10/26/2016 03:55 AM    CHOL/HDL Ratio 4.1 10/26/2016 03:55 AM     Lab Results   Component Value Date/Time    Glucose (POC) 121 10/25/2016 09:22 AM     Lab Results   Component Value Date/Time    Color DARK YELLOW 02/22/2017 12:02 PM    Appearance CLOUDY 02/22/2017 12:02 PM    Specific gravity 1.020 02/22/2017 12:02 PM    pH (UA) 5.0 02/22/2017 12:02 PM    Protein NEGATIVE 02/22/2017 12:02 PM    Glucose NEGATIVE  02/22/2017 12:02 PM    Ketone NEGATIVE  02/22/2017 12:02 PM    Bilirubin NEGATIVE  12/22/2016 11:01 PM    Urobilinogen 1.0 02/22/2017 12:02 PM    Nitrites NEGATIVE  02/22/2017 12:02 PM    Leukocyte Esterase LARGE 02/22/2017 12:02 PM    Epithelial cells FEW 02/22/2017 12:02 PM    Bacteria 2+ 02/22/2017 12:02 PM    WBC 10-20 02/22/2017 12:02 PM    RBC 0-5 02/22/2017 12:02 PM         Medications Reviewed:     Current Facility-Administered Medications   Medication Dose Route Frequency    albuterol (PROVENTIL VENTOLIN) nebulizer solution 2.5 mg  2.5 mg Nebulization Q6H PRN    [START ON 3/2/2017] pantoprazole (PROTONIX) tablet 40 mg  40 mg Oral ACB    LORazepam (ATIVAN) tablet 1 mg  1 mg Oral QHS    HYDROcodone-acetaminophen (NORCO) 5-325 mg per tablet 2 Tab  2 Tab Oral Q6HWA    LORazepam (ATIVAN) injection 1 mg  1 mg IntraVENous Q15MIN PRN    glycopyrrolate (ROBINUL) injection 0.2 mg  0.2 mg IntraVENous Q4H PRN    fentaNYL citrate (PF) injection 25 mcg  25 mcg IntraVENous Q15MIN PRN    0.9% sodium chloride infusion  3 mL/hr IntraVENous CONTINUOUS    sodium chloride (NS) flush 20 mL  20 mL IntraVENous ONCE PRN    balsam peru-castor oil (VENELEX)  mg/gram ointment   Topical BID    sodium chloride (NS) flush 5-10 mL  5-10 mL IntraVENous Q8H    sodium chloride (NS) flush 5-10 mL  5-10 mL IntraVENous PRN    HYDROmorphone (PF) (DILAUDID) injection 0.5 mg  0.5 mg IntraVENous Q4H PRN    acetaminophen (TYLENOL) tablet 650 mg  650 mg Oral Q4H PRN    ondansetron (ZOFRAN) injection 4 mg  4 mg IntraVENous Q6H PRN     ______________________________________________________________________  EXPECTED LENGTH OF STAY: 4d 21h  ACTUAL LENGTH OF STAY:          7                 Ernesto Paulson MD

## 2017-03-02 NOTE — PROGRESS NOTES
Bedside shift change report given to Gage Obrien RN (oncoming nurse) by James Ledesma RN (offgoing nurse). Report included the following information SBAR and Kardex.

## 2017-03-03 NOTE — PROGRESS NOTES
Bedside shift change report given to Amanda Triana RN (oncoming nurse) by Kamari Montoya RN (offgoing nurse). Report included the following information SBAR and MAR.

## 2017-03-03 NOTE — PROGRESS NOTES
Hospitalist Progress Note  Marletta Cheadle, MD  Office: [phone]        Date of Service:  3/3/2017  NAME:  Tomás Butler  :  1935  MRN:  [id number]      Admission Summary:   Tomás Butler is a [de-identified] y.o. female with history of prior pulmonary embolism, pulmonary stenosis, RV failure, HTN, chronic pain, morbid obesity, prior left pleural effusion (?parapneumonic) who presents with shortness of breath x 5 days. She has had multiple recent admissions to [facility] in the past year, most recently seen -2016 for chest pain, where she was diagnosed with pneumonia. She had been discharged to home at that time and had reportedly been in her usual state of health until this weekend, when she had developed initially intermittent shortness of breath, which increased in frequency until she woke up this morning and had constant shortness of breath, which prompted her  to call EMS. She had additionally developed abdominal pain with nausea earlier this morning. She was reported to be hypoxic upon EMS arrival with O2 sat 68% on room air. She was placed on 10 liters nasal cannula and subsequently transported to [facility] ED for further evaluation.     She additionally reports subjective fever with measured temperature of T 99F on Friday prior to admission with chills. She denies chest pain, cough, congestion, sick contacts, recent changes in her medications, travel.     She has actually lost weight per her report. She denies any increasing lower extremity edema.      Of note, she does state that she had stopped her anticoagulation because she did not like how it had made her feel (she had been discharged on Xarelto in December). Interval history / Subjective:   Awake, does not engage much.  at bedside. Assessment & Plan:     1.  Acute hypoxic respiratory failure  - secondary to multiple PEs;  - patient had stopped Xarelto  - she has decided to pursue comfort only care and anticoagulations stopped per palliative care team  -Hospice involved,family plan i discharge to a facility with hospice care not home.     2. Hypotension  - Most likely due to sepsis;  - ? Volume depletion, ? Heart failure - would suspect RV failure  - started on pressors (Levophed and Syed);  - influenza negative  - blood cultures 2/22: no growth;  - urinalysis 2/22 - 10-20 WBCs, 2+ bacteria;  - urine culture: Klebsiella, pansusceptible;   - empiric coverage with Zosyn, vancomycin for now, received levofloxacin in ED  - 2/23: continue pressors;   - 2/24: remains on pressors; still hypotensive; poor urine output. - 2/25: no evidence of bacteremia or Gram positive infection: discontinue Vancomycin, especially since her kidneys are failing; continue Zosyn for now. Add steroids.     -2/27,all medications stopped. Comfort care.     3. Elevated BNP  - uncertain significance, she has some elements in her history consistent with heart failure (ie - nocturnal dyspnea becoming more progressive), but appears volume deplete based upon examination, ? RV failure  - Echo 12/23/16 - EF 55-60%, noted RVSP 50 mm Hg, RV systolic function moderately reduced, moderate hypokinesis of the basal-mid free wall and basal-mid diaphragmatic wall  - repeat Echocardiogram  - give IV fluids for now  - hold home atenolol  - cardiology consulted     4. Lactic acidosis:  - secondary to sepsis and volume contraction;  - received fluids. - blood culture: no growth x 2 days;     5. Elevated transaminases  -likely due to hypoperfusion.     6.  Abdominal pain  - wide differential could include cholecystitis vs mesenteric ischemia vs gastroenteritis, etc.  - lipase non-elevated  - pain control;  - CT abdo/pelvis: Hyperemia and straightening/narrowing of the left colon although without mural thickening, nonspecific, possibly inflammatory, infectious or ischemic.  - clinically most consistent with ischemia in the setting of hypotension and hypoperfusion;      7. Bilateral Pulmonary emboli:  - Anticoagulation stopped by palliative care team per pt wish     8. HTN     9. Hyponatremia: resolved;  - suspect this is hypovolemic hyponatremia even with her elevated BNP, will trial fluid hydration;    10. Acute Renal failure:  - Oliguric;   - She would not want dialysis. Hypothyroidism:  - increase dose of Synthroid;  - TSH 15.50.       Code status: DNR  DVT prophylaxis: Heparin    Care Plan discussed with: Patient/Family and Nurse  Disposition: TBD     Full comfort care. she waxes and wanes,some day alert and other days sleepy. Hospital Problems  Date Reviewed: 2/22/2017          Codes Class Noted POA    Heart burn ICD-10-CM: R12  ICD-9-CM: 787.1  3/1/2017 Unknown        Hypotension ICD-10-CM: I95.9  ICD-9-CM: 458.9  2/22/2017 Yes        * (Principal)Respiratory failure with hypoxia ([facility] ICD-10-CM: J96.91  ICD-9-CM: 518.81  2/22/2017 Yes        History of pulmonary embolism ICD-10-CM: C83.699  ICD-9-CM: V12.55  8/1/2016 Yes        Hypertension, essential, benign ICD-10-CM: I10  ICD-9-CM: 401.1  8/1/2016 Yes        Morbid obesity with body mass index of 40.0-49.9 (HCC) ICD-10-CM: E66.01  ICD-9-CM: 278.01  8/1/2016 Yes        RVF (right ventricular failure) ([facility] ICD-10-CM: I50.9  ICD-9-CM: 428.0  7/14/2015 Yes                Review of Systems:   Review of systems not obtained due to patient factors. Vital Signs:    Last 24hrs VS reviewed since prior progress note.  Most recent are:  Visit Vitals    /69 (BP 1 Location: Right arm, BP Patient Position: At rest)    Pulse 85    Temp 97.9 °F (36.6 °C)    Resp 20    Ht 5' 2\" (1.575 m)    Wt 115 kg (253 lb 8.5 oz)    SpO2 93%    BMI 46.37 kg/m2         Intake/Output Summary (Last 24 hours) at 03/03/17 1233  Last data filed at 03/03/17 0721   Gross per 24 hour   Intake              240 ml   Output              800 ml   Net             -560 ml        Physical Examination:             Constitutional:  Awake.  at bedside. ENT:  Oral mucous moist, oropharynx benign. Neck supple,    Resp:  CTA bilaterally. Some rattles. CV:  Regular rhythm, normal rate, no murmurs, gallops, rubs    GI:  Soft, non distended, non tender. normoactive bowel sounds;carvajal in place    Musculoskeletal:  +edema    Neurologic:  Moves all extremities. Answers questions appropriately     Skin:  warm and dry, but feet cold, and some distant mottling of the skin is noted;       Data Review:    Review and/or order of clinical lab test  Review and/or order of tests in the radiology section of CPT  Review and/or order of tests in the medicine section of CPT      Labs:     No results for input(s): WBC, HGB, HCT, PLT, HGBEXT, HCTEXT, PLTEXT, HGBEXT, HCTEXT, PLTEXT in the last 72 hours. No results for input(s): NA, K, CL, CO2, BUN, CREA, GLU, CA, MG, PHOS, URICA in the last 72 hours. No results for input(s): SGOT, GPT, ALT, AP, TBIL, TBILI, TP, ALB, GLOB, GGT, AML, LPSE in the last 72 hours. No lab exists for component: AMYP, HLPSE  No results for input(s): INR, PTP, APTT in the last 72 hours. No lab exists for component: INREXT, INREXT   No results for input(s): FE, TIBC, PSAT, FERR in the last 72 hours. No results found for: FOL, RBCF   No results for input(s): PH, PCO2, PO2 in the last 72 hours. No results for input(s): CPK, CKNDX, TROIQ in the last 72 hours.     No lab exists for component: CPKMB  Lab Results   Component Value Date/Time    Cholesterol, total 78 10/26/2016 03:55 AM    HDL Cholesterol 19 10/26/2016 03:55 AM    LDL, calculated 40.6 10/26/2016 03:55 AM    Triglyceride 92 10/26/2016 03:55 AM    CHOL/HDL Ratio 4.1 10/26/2016 03:55 AM     Lab Results   Component Value Date/Time    Glucose (POC) 121 10/25/2016 09:22 AM     Lab Results   Component Value Date/Time    Color DARK YELLOW 02/22/2017 12:02 PM    Appearance CLOUDY 02/22/2017 12:02 PM    Specific gravity 1.020 02/22/2017 12:02 PM    pH (UA) 5.0 02/22/2017 12:02 PM    Protein NEGATIVE  02/22/2017 12:02 PM    Glucose NEGATIVE  02/22/2017 12:02 PM    Ketone NEGATIVE  02/22/2017 12:02 PM    Bilirubin NEGATIVE  12/22/2016 11:01 PM    Urobilinogen 1.0 02/22/2017 12:02 PM    Nitrites NEGATIVE  02/22/2017 12:02 PM    Leukocyte Esterase LARGE 02/22/2017 12:02 PM    Epithelial cells FEW 02/22/2017 12:02 PM    Bacteria 2+ 02/22/2017 12:02 PM    WBC 10-20 02/22/2017 12:02 PM    RBC 0-5 02/22/2017 12:02 PM         Medications Reviewed:     Current Facility-Administered Medications   Medication Dose Route Frequency    albuterol (PROVENTIL VENTOLIN) nebulizer solution 2.5 mg  2.5 mg Nebulization Q6H PRN    pantoprazole (PROTONIX) tablet 40 mg  40 mg Oral ACB    LORazepam (ATIVAN) tablet 1 mg  1 mg Oral QHS    HYDROcodone-acetaminophen (NORCO) 5-325 mg per tablet 2 Tab  2 Tab Oral Q6HWA    LORazepam (ATIVAN) injection 1 mg  1 mg IntraVENous Q15MIN PRN    glycopyrrolate (ROBINUL) injection 0.2 mg  0.2 mg IntraVENous Q4H PRN    fentaNYL citrate (PF) injection 25 mcg  25 mcg IntraVENous Q15MIN PRN    0.9% sodium chloride infusion  3 mL/hr IntraVENous CONTINUOUS    sodium chloride (NS) flush 20 mL  20 mL IntraVENous ONCE PRN    balsam peru-castor oil (VENELEX)  mg/gram ointment   Topical BID    sodium chloride (NS) flush 5-10 mL  5-10 mL IntraVENous Q8H    sodium chloride (NS) flush 5-10 mL  5-10 mL IntraVENous PRN    HYDROmorphone (PF) (DILAUDID) injection 0.5 mg  0.5 mg IntraVENous Q4H PRN    acetaminophen (TYLENOL) tablet 650 mg  650 mg Oral Q4H PRN    ondansetron (ZOFRAN) injection 4 mg  4 mg IntraVENous Q6H PRN     ______________________________________________________________________  EXPECTED LENGTH OF STAY: 4d 21h  ACTUAL LENGTH OF STAY:          9                 Amber Salgado MD

## 2017-03-03 NOTE — HOSPICE
[facility] RN NOTE    Follow up on prior hospice referral.  Spoke with RENAE Arango who reported that family preference is [facility] and they do not have a bed available at this time. Please notify us once facility/discharge destination is identified so we can determine if it is a [facility] facility and if we can offer service if patient/family elect hospice services. As of this time family/patient have not elected hospice.      Thank you for the opportunity    Nia Jauregui RN BSN [facility]  [phone]

## 2017-03-04 NOTE — PROGRESS NOTES
Bedside shift change report given to [employer] Diana Burnett RN (oncoming nurse) by Mindy Alaniz RN (offgoing nurse). Report included the following information SBAR.

## 2017-03-04 NOTE — PROGRESS NOTES
Hospitalist Progress Note  Ana Roberson MD  Office: [phone]        Date of Service:  3/4/2017  NAME:  Humberto Vegas  :  1935  MRN:  [id number]      Admission Summary:   Humberto Vegas is a [de-identified] y.o. female with history of prior pulmonary embolism, pulmonary stenosis, RV failure, HTN, chronic pain, morbid obesity, prior left pleural effusion (?parapneumonic) who presents with shortness of breath x 5 days. She has had multiple recent admissions to [facility] in the past year, most recently seen -2016 for chest pain, where she was diagnosed with pneumonia. She had been discharged to home at that time and had reportedly been in her usual state of health until this weekend, when she had developed initially intermittent shortness of breath, which increased in frequency until she woke up this morning and had constant shortness of breath, which prompted her  to call EMS. She had additionally developed abdominal pain with nausea earlier this morning. She was reported to be hypoxic upon EMS arrival with O2 sat 68% on room air. She was placed on 10 liters nasal cannula and subsequently transported to [facility] ED for further evaluation.     She additionally reports subjective fever with measured temperature of T 99F on Friday prior to admission with chills. She denies chest pain, cough, congestion, sick contacts, recent changes in her medications, travel.     She has actually lost weight per her report. She denies any increasing lower extremity edema.      Of note, she does state that she had stopped her anticoagulation because she did not like how it had made her feel (she had been discharged on Xarelto in December). Interval history / Subjective:   Awake, more alert. She knows where she is [place]. Missed the date,she thinks it was Thursday.  at bedside. Awaiting dispo. Assessment & Plan:     1.  Acute hypoxic respiratory failure  - secondary to multiple PEs;  - patient had stopped Xarelto  -2/27 she has decided to pursue comfort only care and anticoagulations stopped per palliative care team  -Hospice involved,family plan i discharge to a facility with hospice care not home.     2. Hypotension  - Most likely due to sepsis;  - ? Volume depletion, ? Heart failure - would suspect RV failure  - started on pressors (Levophed and Syed);  - influenza negative  - blood cultures 2/22: no growth;  - urinalysis 2/22 - 10-20 WBCs, 2+ bacteria;  - urine culture: Klebsiella, pansusceptible;   - empiric coverage with Zosyn, vancomycin for now, received levofloxacin in ED  - 2/23: continue pressors;   - 2/24: remains on pressors; still hypotensive; poor urine output. - 2/25: no evidence of bacteremia or Gram positive infection: discontinue Vancomycin, especially since her kidneys are failing; continue Zosyn for now. Add steroids.     -2/27,all medications stopped. Comfort care.     3. Elevated BNP  - uncertain significance, she has some elements in her history consistent with heart failure (ie - nocturnal dyspnea becoming more progressive), but appears volume deplete based upon examination, ? RV failure  - Echo 12/23/16 - EF 55-60%, noted RVSP 50 mm Hg, RV systolic function moderately reduced, moderate hypokinesis of the basal-mid free wall and basal-mid diaphragmatic wall  - repeat Echocardiogram  - give IV fluids for now  - hold home atenolol  - cardiology consulted     4. Lactic acidosis:  - secondary to sepsis and volume contraction;  - received fluids. - blood culture: no growth x 2 days;     5. Elevated transaminases  -likely due to hypoperfusion.     6.  Abdominal pain  - wide differential could include cholecystitis vs mesenteric ischemia vs gastroenteritis, etc.  - lipase non-elevated  - pain control;  - CT abdo/pelvis: Hyperemia and straightening/narrowing of the left colon although without mural thickening, nonspecific, possibly inflammatory, infectious or ischemic.  - clinically most consistent with ischemia in the setting of hypotension and hypoperfusion;      7. Bilateral Pulmonary emboli:  - Anticoagulation stopped by palliative care team per pt wish     8. HTN     9. Hyponatremia: resolved;  - suspect this is hypovolemic hyponatremia even with her elevated BNP, will trial fluid hydration;    10. Acute Renal failure:  - Oliguric;   - She would not want dialysis. Hypothyroidism:  - increase dose of Synthroid;  - TSH 15.50.       Code status: DNR  DVT prophylaxis: Heparin    Care Plan discussed with: Patient/Family and Nurse  Disposition: TBD     Full comfort care. she waxes and wanes,some day alert and other days sleepy. Hospital Problems  Date Reviewed: 2/22/2017          Codes Class Noted POA    Heart burn ICD-10-CM: R12  ICD-9-CM: 787.1  3/1/2017 Unknown        Hypotension ICD-10-CM: I95.9  ICD-9-CM: 458.9  2/22/2017 Yes        * (Principal)Respiratory failure with hypoxia ([facility] ICD-10-CM: J96.91  ICD-9-CM: 518.81  2/22/2017 Yes        History of pulmonary embolism ICD-10-CM: I89.355  ICD-9-CM: V12.55  8/1/2016 Yes        Hypertension, essential, benign ICD-10-CM: I10  ICD-9-CM: 401.1  8/1/2016 Yes        Morbid obesity with body mass index of 40.0-49.9 (HCC) ICD-10-CM: E66.01  ICD-9-CM: 278.01  8/1/2016 Yes        RVF (right ventricular failure) ([facility] ICD-10-CM: I50.9  ICD-9-CM: 428.0  7/14/2015 Yes                Review of Systems:   Review of systems not obtained due to patient factors. Vital Signs:    Last 24hrs VS reviewed since prior progress note.  Most recent are:  Visit Vitals    /58 (BP 1 Location: Left arm, BP Patient Position: At rest)    Pulse 91    Temp 97.9 °F (36.6 °C)    Resp 19    Ht 5' 2\" (1.575 m)    Wt 115 kg (253 lb 8.5 oz)    SpO2 91%    BMI 46.37 kg/m2         Intake/Output Summary (Last 24 hours) at 03/04/17 1847  Last data filed at 03/04/17 1740   Gross per 24 hour Intake              480 ml   Output              775 ml   Net             -295 ml        Physical Examination:             Constitutional:  Awake.  at bedside. ENT:  Oral mucous moist, oropharynx benign. Neck supple,    Resp:  CTA bilaterally. Some rattles. CV:  Regular rhythm, normal rate, no murmurs, gallops, rubs    GI:  Soft, non distended, non tender. normoactive bowel sounds;carvajal in place    Musculoskeletal:  +edema    Neurologic:  Moves all extremities. Answers questions appropriately     Skin:  warm and dry, but feet cold, and some distant mottling of the skin is noted;       Data Review:    Review and/or order of clinical lab test  Review and/or order of tests in the radiology section of CPT  Review and/or order of tests in the medicine section of CPT      Labs:     No results for input(s): WBC, HGB, HCT, PLT, HGBEXT, HCTEXT, PLTEXT, HGBEXT, HCTEXT, PLTEXT in the last 72 hours. No results for input(s): NA, K, CL, CO2, BUN, CREA, GLU, CA, MG, PHOS, URICA in the last 72 hours. No results for input(s): SGOT, GPT, ALT, AP, TBIL, TBILI, TP, ALB, GLOB, GGT, AML, LPSE in the last 72 hours. No lab exists for component: AMYP, HLPSE  No results for input(s): INR, PTP, APTT in the last 72 hours. No lab exists for component: INREXT, INREXT   No results for input(s): FE, TIBC, PSAT, FERR in the last 72 hours. No results found for: FOL, RBCF   No results for input(s): PH, PCO2, PO2 in the last 72 hours. No results for input(s): CPK, CKNDX, TROIQ in the last 72 hours.     No lab exists for component: CPKMB  Lab Results   Component Value Date/Time    Cholesterol, total 78 10/26/2016 03:55 AM    HDL Cholesterol 19 10/26/2016 03:55 AM    LDL, calculated 40.6 10/26/2016 03:55 AM    Triglyceride 92 10/26/2016 03:55 AM    CHOL/HDL Ratio 4.1 10/26/2016 03:55 AM     Lab Results   Component Value Date/Time    Glucose (POC) 121 10/25/2016 09:22 AM     Lab Results   Component Value Date/Time    Color DARK YELLOW 02/22/2017 12:02 PM    Appearance CLOUDY 02/22/2017 12:02 PM    Specific gravity 1.020 02/22/2017 12:02 PM    pH (UA) 5.0 02/22/2017 12:02 PM    Protein NEGATIVE  02/22/2017 12:02 PM    Glucose NEGATIVE  02/22/2017 12:02 PM    Ketone NEGATIVE  02/22/2017 12:02 PM    Bilirubin NEGATIVE  12/22/2016 11:01 PM    Urobilinogen 1.0 02/22/2017 12:02 PM    Nitrites NEGATIVE  02/22/2017 12:02 PM    Leukocyte Esterase LARGE 02/22/2017 12:02 PM    Epithelial cells FEW 02/22/2017 12:02 PM    Bacteria 2+ 02/22/2017 12:02 PM    WBC 10-20 02/22/2017 12:02 PM    RBC 0-5 02/22/2017 12:02 PM         Medications Reviewed:     Current Facility-Administered Medications   Medication Dose Route Frequency    albuterol (PROVENTIL VENTOLIN) nebulizer solution 2.5 mg  2.5 mg Nebulization Q6H PRN    pantoprazole (PROTONIX) tablet 40 mg  40 mg Oral ACB    LORazepam (ATIVAN) tablet 1 mg  1 mg Oral QHS    HYDROcodone-acetaminophen (NORCO) 5-325 mg per tablet 2 Tab  2 Tab Oral Q6HWA    LORazepam (ATIVAN) injection 1 mg  1 mg IntraVENous Q15MIN PRN    glycopyrrolate (ROBINUL) injection 0.2 mg  0.2 mg IntraVENous Q4H PRN    fentaNYL citrate (PF) injection 25 mcg  25 mcg IntraVENous Q15MIN PRN    0.9% sodium chloride infusion  3 mL/hr IntraVENous CONTINUOUS    sodium chloride (NS) flush 20 mL  20 mL IntraVENous ONCE PRN    balsam peru-castor oil (VENELEX)  mg/gram ointment   Topical BID    sodium chloride (NS) flush 5-10 mL  5-10 mL IntraVENous Q8H    sodium chloride (NS) flush 5-10 mL  5-10 mL IntraVENous PRN    HYDROmorphone (PF) (DILAUDID) injection 0.5 mg  0.5 mg IntraVENous Q4H PRN    acetaminophen (TYLENOL) tablet 650 mg  650 mg Oral Q4H PRN    ondansetron (ZOFRAN) injection 4 mg  4 mg IntraVENous Q6H PRN     ______________________________________________________________________  EXPECTED LENGTH OF STAY: 4d 21h  ACTUAL LENGTH OF STAY:          10                 Ese Baxter MD

## 2017-03-05 NOTE — PROGRESS NOTES
Bedside shift change report given to sen (oncoming nurse) by Alaina Fox (offgoing nurse). Report included the following information SBAR.     2240 during assessment found pt rectal tube out of place notified INDERJIT Copeland, no new orders given.

## 2017-03-05 NOTE — PROGRESS NOTES
Bedside shift change report given to [facility] (oncoming nurse) by Estella Land RN (offgoing nurse). Report included the following information SBAR, Kardex, Intake/Output and Recent Results. 13:30 Paged MD ruiz. Pt has crusty drainage on both eyes. Pt complaining of eyes itching and burning. Waiting for call back.

## 2017-03-05 NOTE — PROGRESS NOTES
Hospitalist Progress Note  Marletta Cheadle, MD  Office: [phone]        Date of Service:  3/5/2017  NAME:  Tomás Butler  :  1935  MRN:  [id number]      Admission Summary:   Tomás Butler is a [de-identified] y.o. female with history of prior pulmonary embolism, pulmonary stenosis, RV failure, HTN, chronic pain, morbid obesity, prior left pleural effusion (?parapneumonic) who presents with shortness of breath x 5 days. She has had multiple recent admissions to [facility] in the past year, most recently seen -2016 for chest pain, where she was diagnosed with pneumonia. She had been discharged to home at that time and had reportedly been in her usual state of health until this weekend, when she had developed initially intermittent shortness of breath, which increased in frequency until she woke up this morning and had constant shortness of breath, which prompted her  to call EMS. She had additionally developed abdominal pain with nausea earlier this morning. She was reported to be hypoxic upon EMS arrival with O2 sat 68% on room air. She was placed on 10 liters nasal cannula and subsequently transported to [facility] ED for further evaluation.     She additionally reports subjective fever with measured temperature of T 99F on Friday prior to admission with chills. She denies chest pain, cough, congestion, sick contacts, recent changes in her medications, travel.     She has actually lost weight per her report. She denies any increasing lower extremity edema.      Of note, she does state that she had stopped her anticoagulation because she did not like how it had made her feel (she had been discharged on Xarelto in December). Interval history / Subjective: On rounds on 3/5,awake, disengaged. , daughter and son in law in the room    Daughter has some specific questions such as about her blood pressure,if the blood clot has dissolved and concerned about some edema on the arm. I discussed in detail with them that the bigger picture is that she chose she does not want any treatment  And stopped all treatment almost a week ago,wanted only be comfortable. So its not unexpected to see changes in vital signs and her condition that she may even deteriorate rapidly and finally die any time,and whenever that happens we will make sure she is comfortably. However I don't see any sign of death right now,but things may change anytime. She is comfortable at present. They understood and were appreciative. Assessment & Plan:     1. Acute hypoxic respiratory failure  - secondary to multiple PEs;  - patient had stopped Xarelto  -2/27 she has decided to pursue comfort only care and anticoagulations stopped per palliative care team  -Hospice involved,family plan i discharge to a facility with hospice care not home.     2. Hypotension  - Most likely due to sepsis;  - ? Volume depletion, ? Heart failure - would suspect RV failure  - started on pressors (Levophed and Syed);  - influenza negative  - blood cultures 2/22: no growth;  - urinalysis 2/22 - 10-20 WBCs, 2+ bacteria;  - urine culture: Klebsiella, pansusceptible;   - empiric coverage with Zosyn, vancomycin for now, received levofloxacin in ED  - 2/23: continue pressors;   - 2/24: remains on pressors; still hypotensive; poor urine output. - 2/25: no evidence of bacteremia or Gram positive infection: discontinue Vancomycin, especially since her kidneys are failing; continue Zosyn for now. Add steroids.     -2/27,all medications stopped. Comfort care.     3. Elevated BNP  - uncertain significance, she has some elements in her history consistent with heart failure (ie - nocturnal dyspnea becoming more progressive), but appears volume deplete based upon examination, ?  RV failure  - Echo 12/23/16 - EF 55-60%, noted RVSP 50 mm Hg, RV systolic function moderately reduced, moderate hypokinesis of the basal-mid free wall and basal-mid diaphragmatic wall  - repeat Echocardiogram  - give IV fluids for now  - hold home atenolol  - cardiology consulted     4. Lactic acidosis:  - secondary to sepsis and volume contraction;  - received fluids. - blood culture: no growth x 2 days;     5. Elevated transaminases  -likely due to hypoperfusion.     6. Abdominal pain  - wide differential could include cholecystitis vs mesenteric ischemia vs gastroenteritis, etc.  - lipase non-elevated  - pain control;  - CT abdo/pelvis: Hyperemia and straightening/narrowing of the left colon although without mural thickening, nonspecific, possibly inflammatory, infectious or ischemic.  - clinically most consistent with ischemia in the setting of hypotension and hypoperfusion;      7. Bilateral Pulmonary emboli:  - Anticoagulation stopped by palliative care team per pt wish     8. HTN     9. Hyponatremia: resolved;  - suspect this is hypovolemic hyponatremia even with her elevated BNP, will trial fluid hydration;    10. Acute Renal failure:  - Oliguric;   - She would not want dialysis. Hypothyroidism:  - increase dose of Synthroid;  - TSH 15.50.       Code status: DNR  DVT prophylaxis: Heparin    Care Plan discussed with: Patient/Family and Nurse  Disposition: TBD     Full comfort care. she waxes and wanes,some day alert and other days sleepy. On 3/5  Daughter has some specific questions such as about her blood pressure,if the blood clot has dissolved and concerned about some edema on the arm. I discussed in detail with them that the bigger picture is that she chose she does not want any treatment  And stopped all treatment almost a week ago,wanted only be comfortable. So its not unexpected to see changes in vital signs and her condition that she may even deteriorate rapidly and finally die any time,and whenever that happens we will make sure she is comfortably. However I don't see any sign of death right now,but things may change anytime. She is comfortable at present. They understood and were appreciative. Hospital Problems  Date Reviewed: 2/22/2017          Codes Class Noted POA    Heart burn ICD-10-CM: R12  ICD-9-CM: 787.1  3/1/2017 Unknown        Hypotension ICD-10-CM: I95.9  ICD-9-CM: 458.9  2/22/2017 Yes        * (Principal)Respiratory failure with hypoxia ([facility] [address] ICD-10-CM: J96.91  ICD-9-CM: 518.81  2/22/2017 Yes        History of pulmonary embolism ICD-10-CM: V42.159  ICD-9-CM: V12.55  8/1/2016 Yes        Hypertension, essential, benign ICD-10-CM: I10  ICD-9-CM: 401.1  8/1/2016 Yes        Morbid obesity with body mass index of 40.0-49.9 (HCC) ICD-10-CM: E66.01  ICD-9-CM: 278.01  8/1/2016 Yes        RV[facility] (right ventricular failure) ([facility] ICD-10-CM: I50.9  ICD-9-CM: 428.0  7/14/2015 Yes                Review of Systems:   Review of systems not obtained due to patient factors. Vital Signs:    Last 24hrs VS reviewed since prior progress note. Most recent are:  Visit Vitals    /65 (BP 1 Location: Left arm, BP Patient Position: At rest)    Pulse 94    Temp 98.1 °F (36.7 °C)    Resp 18    Ht 5' 2\" (1.575 m)    Wt 116.3 kg (256 lb 6.3 oz)    SpO2 90%    BMI 46.9 kg/m2         Intake/Output Summary (Last 24 hours) at 03/05/17 1540  Last data filed at 03/05/17 0640   Gross per 24 hour   Intake              480 ml   Output              525 ml   Net              -45 ml        Physical Examination:             Constitutional:  Awake,disengaged.  at bedside. ENT:  Oral mucous moist, oropharynx benign. Neck supple,    Resp:  CTA bilaterally. Some rattles. CV:  Regular rhythm, normal rate, no murmurs, gallops, rubs    GI:  Soft, non distended, non tender. normoactive bowel sounds;carvajal in place    Musculoskeletal:  +edema    Neurologic:  Moves all extremities.   Answers questions appropriately     Skin:  warm and dry, but feet cold, and some distant mottling of the skin is noted;       Data Review:    Review and/or order of clinical lab test  Review and/or order of tests in the radiology section of CPT  Review and/or order of tests in the medicine section of CPT      Labs:     No results for input(s): WBC, HGB, HCT, PLT, HGBEXT, HCTEXT, PLTEXT, HGBEXT, HCTEXT, PLTEXT in the last 72 hours. No results for input(s): NA, K, CL, CO2, BUN, CREA, GLU, CA, MG, PHOS, URICA in the last 72 hours. No results for input(s): SGOT, GPT, ALT, AP, TBIL, TBILI, TP, ALB, GLOB, GGT, AML, LPSE in the last 72 hours. No lab exists for component: AMYP, HLPSE  No results for input(s): INR, PTP, APTT in the last 72 hours. No lab exists for component: INREXT, INREXT   No results for input(s): FE, TIBC, PSAT, FERR in the last 72 hours. No results found for: FOL, RBCF   No results for input(s): PH, PCO2, PO2 in the last 72 hours. No results for input(s): CPK, CKNDX, TROIQ in the last 72 hours.     No lab exists for component: CPKMB  Lab Results   Component Value Date/Time    Cholesterol, total 78 10/26/2016 03:55 AM    HDL Cholesterol 19 10/26/2016 03:55 AM    LDL, calculated 40.6 10/26/2016 03:55 AM    Triglyceride 92 10/26/2016 03:55 AM    CHOL/HDL Ratio 4.1 10/26/2016 03:55 AM     Lab Results   Component Value Date/Time    Glucose (POC) 121 10/25/2016 09:22 AM     Lab Results   Component Value Date/Time    Color DARK YELLOW 02/22/2017 12:02 PM    Appearance CLOUDY 02/22/2017 12:02 PM    Specific gravity 1.020 02/22/2017 12:02 PM    pH (UA) 5.0 02/22/2017 12:02 PM    Protein NEGATIVE  02/22/2017 12:02 PM    Glucose NEGATIVE  02/22/2017 12:02 PM    Ketone NEGATIVE  02/22/2017 12:02 PM    Bilirubin NEGATIVE  12/22/2016 11:01 PM    Urobilinogen 1.0 02/22/2017 12:02 PM    Nitrites NEGATIVE  02/22/2017 12:02 PM    Leukocyte Esterase LARGE 02/22/2017 12:02 PM    Epithelial cells FEW 02/22/2017 12:02 PM    Bacteria 2+ 02/22/2017 12:02 PM    WBC 10-20 02/22/2017 12:02 PM    RBC 0-5 02/22/2017 12:02 PM         Medications Reviewed:     Current Facility-Administered Medications Medication Dose Route Frequency    erythromycin (ILOTYCIN) 5 mg/gram (0.5 %) ophthalmic ointment   Both Eyes Q8H    polyvinyl alcohol (LIQUIFILM TEARS) 1.4 % ophthalmic solution 1 Drop  1 Drop Both Eyes PRN    albuterol (PROVENTIL VENTOLIN) nebulizer solution 2.5 mg  2.5 mg Nebulization Q6H PRN    pantoprazole (PROTONIX) tablet 40 mg  40 mg Oral ACB    LORazepam (ATIVAN) tablet 1 mg  1 mg Oral QHS    HYDROcodone-acetaminophen (NORCO) 5-325 mg per tablet 2 Tab  2 Tab Oral Q6HWA    LORazepam (ATIVAN) injection 1 mg  1 mg IntraVENous Q15MIN PRN    glycopyrrolate (ROBINUL) injection 0.2 mg  0.2 mg IntraVENous Q4H PRN    fentaNYL citrate (PF) injection 25 mcg  25 mcg IntraVENous Q15MIN PRN    0.9% sodium chloride infusion  3 mL/hr IntraVENous CONTINUOUS    sodium chloride (NS) flush 20 mL  20 mL IntraVENous ONCE PRN    balsam peru-castor oil (VENELEX)  mg/gram ointment   Topical BID    sodium chloride (NS) flush 5-10 mL  5-10 mL IntraVENous Q8H    sodium chloride (NS) flush 5-10 mL  5-10 mL IntraVENous PRN    HYDROmorphone (PF) (DILAUDID) injection 0.5 mg  0.5 mg IntraVENous Q4H PRN    acetaminophen (TYLENOL) tablet 650 mg  650 mg Oral Q4H PRN    ondansetron (ZOFRAN) injection 4 mg  4 mg IntraVENous Q6H PRN     ______________________________________________________________________  EXPECTED LENGTH OF STAY: 4d 21h  ACTUAL LENGTH OF STAY:          11                 Nicolasa Singh MD

## 2017-03-06 NOTE — PROGRESS NOTES
Bedside shift change report given to [address] (oncoming nurse) by Jennifer Rodriguez RN (offgoing nurse). Report included the following information SBAR, Kardex, ED Summary and Recent Results.

## 2017-03-06 NOTE — PROGRESS NOTES
CM met with patient, patient's spouse and patient's daughter Smita Necessary to review and discuss disposition needs. Patient and family are still interested in patient transitioning to [facility] for care. Family informed and confirmed with [address] that they are not accepting patient's at this time due to a GI Virus within their facility. Family provided with a list of facilities to review. Family indicated that they would review and get back with CM with other possible choices. CM spoke with [employer] Representative Josue Yanez [phone]. [place] Medicaid Application has been completed and signed. Family was informed that they would need to obtain 3 months worth of bank statements to submit. CM informed that patient and spouse had bankruptcy hearing this am as well. UAI was emailed to [place]. CM will continue to follow and assist with disposition needs.     KRISTA Hollingsworth/DIANE  3:29 PM

## 2017-03-06 NOTE — PROGRESS NOTES
Bedside shift change report given to Marjorie Gilmore (oncoming nurse) by Jessika Peña (offgoing nurse). Report included the following information SBAR.

## 2017-03-07 NOTE — WOUND CARE
WOCN Note:   reassessment for buttock and panus open areas; Chart reviewed prior to assessment;      Assessment:   Patient is alert, verbal, patient rated pain 0/10; Patient repositioned with max assist from supine to left on total care bed with heels floated; Pt tolerated assessment and procedure well.      Wound Assessment: present on admission. 1. Left buttock minute open area consistent with moisture/friction irritation measured 0.2cm x 0.2cm x <0.1cm, 100% pink, periwound skin darker pigment/hypopigment mix consistent with chronic moisture/friction irritation;  2. Left abdominal panus with open pink area consistent with moisture breakdown 0.5cm x 1.5cm x 0.1cm; marathon applied; Wound Care Recommendations:   1. Continue with venelex ointment as ordered and zinc cream in between; may tuck gauze in abdominal fold to absorb moisture;       Skin Care/Prevention Recommendations:  1. Continue continence checks; Apply venelex as ordered and zinc cream with each incontinent episode, use moisturizing cleanser foam to remove soiled surface layer and reapply zinc;   2. Reposition patient approximately every 2 hours. 3. Assess/protect skin in contact with medical devices. Keep skin moisturized. 4. Float Heels with pillow under calves. 5. Continue on total care bed for pressure redistribution;   6.  Ensure that patient is repositioning in chair shifting weight approximately every 15 minutes and standing up every hour;         Discussed above assessment and recommendations with Mary Clark (GILDA)     Trace Nino RN, BSN, [facility]  Certified Wound, Ostomy, Continence Nurse  office [phone]  pager [facility]

## 2017-03-07 NOTE — PROGRESS NOTES
Bedside shift change report given to [address]  (oncoming nurse) by Lana Self (offgoing nurse). Report included the following information SBAR, Kardex and Recent Results.

## 2017-03-07 NOTE — PROGRESS NOTES
NUTRITION       Brief follow up. Chart reviewed, discussed with RN, MD and team during interdisciplinary rounds. Pt remains on Regular diet with Ensure Enlive TID. She is accepting mostly supplements, so will continue as ordered. She remains on comfort measures only. Please consult if this nutrition team can be of assistance. Thank you.      [address]

## 2017-03-07 NOTE — PROGRESS NOTES
11:53 AM  CM received a call from Rosanne Flakes with [phone] in regards to patient via voicemail. CM contacted worker. No one was present at the time of the call. CM left a message requesting a return call. 1:46 PM  CM received a return call from Rosanne Flakes with [phone]. Present on the phone were, NP [facility] and Dr. Daily Dietrich. CM provided [facility] care management team an update on patient's current status and current discharge plans. [facility] care management team looking to assist with discharge plans and funding for family until Medicaid can become active. Team is aware that Medicaid application has been completed and UAI has been successfully processed. Team is aware that patient is currently comfort care and hospice has been monitoring patient's status for any significant change at any given time. Team was appreciative of update provided and look to reach out to patient's daughter and assist CM with disposition needs. 1:59 PM  CM received a call from Rosanne Flakes with [phone]. Worker reported that she has reached out to [facility] admissions, Devon Naranjo [phone] to inquire about stay at facility pending Medicaid. Ms. Marylin Harada indicated that she would need to present case to  and follow-up. Rosanne Flakes to present option to patient daughter. [facility] reported that patient's daughter would be able to provide $1500.00 up front, but would need more financial assistance. Jed Barnett indicated that she is currently looking into other financial resources to assist family while Medicaid is being reviewed. CM will continue to follow and assist with disposition needs.     KRISTA See/DIANE

## 2017-03-07 NOTE — PROGRESS NOTES
Bedside shift change report given to Walker Luong RN (oncoming nurse) by Fredo Zazueta RN (offgoing nurse). Report included the following information SBAR and Kardex.

## 2017-03-07 NOTE — PROGRESS NOTES
Bedside and Verbal shift change report given to Christi Pacheco RN (oncoming nurse) by August Bustillos (offgoing nurse). Report included the following information SBAR, Kardex, Intake/Output and MAR.

## 2017-03-07 NOTE — PROGRESS NOTES
Bedside and Verbal shift change report given to [address] (oncoming nurse) by Seema Patton RN (offgoing nurse). Report included the following information SBAR, Kardex, Intake/Output and MAR.

## 2017-03-08 NOTE — PROGRESS NOTES
PN, medicine    Pt cont as comfort care only.    Pt comfortable; no new issus other than some mild de-sats , NC 02 adjusted    Rancho Andino MD 3/8/2017

## 2017-03-08 NOTE — PROGRESS NOTES
Bedside shift change report given to Ferol Goldmann, RN (oncoming nurse) by Edyta Pierce RN (offgoing nurse). Report included the following information SBAR and MAR.

## 2017-03-08 NOTE — PROGRESS NOTES
Bedside shift change report given to Jeni Molina RN (oncoming nurse) by Jennifer Erickson RN (offgoing nurse). Report included the following information SBAR and Kardex.

## 2017-03-08 NOTE — PROGRESS NOTES
11:17 AM  CM contacted patient's daughter, Farhana Salmon [phone] to follow-up on .  Farhana Salmon stated that she has not looked into any other options for facilities at this time and has indicated that [facility] [phone] worker Quinn Verdin, will take over. CM contacted Quinn Shirleys [phone] with [facility]. Worker stated that she will be taking the lead to assist patient's family at this time. Worker is still waiting to hear back from [place] on wether or not they would be able to accept patient with Medicaid pending. Worker also provided CM with a list of other facilities to submit a referral to. Facilities identified are [place] [address]. CM submitted referrals to agencies notifying them that patient is Medicaid pending and UAI has been completed and successfully processed and is currently awaiting on a response. RENAE contacted Christophe [address] with [facility] [phone] to verify when Medicaid application was submitted. RENAE was informed that application was submitted on 3/7/16 and is due to process April 20, 2017.    2:01 PM  CM received a call from patient's daughter Farhana Salmon. Farhana Salmon asked if a referral could be submitted to [address] [place] [address]. CM will submit referral via all scripts. CM provided Farhana Salmon with update that she received a call from [facility] with [facility] mahnaz, who is requesting to come and meet with patient and spouse. Farhana Salmon stated that it would be fine and that she would reach out to her father and inform him. RENAE also informed Farhana Salmon that [address] [place] [address] are willing to accept patient at this time as well. Family still would like a referral submitted to [facility] [employer] before making any decisions. RENAE also contacted [facility] [phone], Quinn Verdin to provide her with update. No one was present at the time of the call. CM left a message requesting a return call.     Maryjo Lawson, KRISTA/DIANE

## 2017-03-09 PROBLEM — J96.91 RESPIRATORY FAILURE WITH HYPOXIA (HCC): Status: RESOLVED | Noted: 2017-01-01 | Resolved: 2017-01-01

## 2017-03-09 PROBLEM — I95.9 HYPOTENSION: Status: RESOLVED | Noted: 2017-01-01 | Resolved: 2017-01-01

## 2017-03-09 NOTE — DISCHARGE SUMMARY
Discharge Summary       PATIENT ID: Nic Willett  MRN: [id number]   YOB: 1935    DATE OF ADMISSION: 2/22/2017 11:28 AM    DATE OF DISCHARGE: 3/10/2017    PRIMARY CARE PROVIDER: Yoandy Barron MD     ATTENDING PHYSICIAN: Nicola White MD   DISCHARGING PROVIDER: Nicola White MD    To contact this individual call [phone] and ask the  to page. If unavailable ask to be transferred the Adult Hospitalist Department. CONSULTATIONS: IP CONSULT TO HOSPITALIST  IP CONSULT TO CARDIOLOGY  IP CONSULT TO NEPHROLOGY    PROCEDURES/SURGERIES: * No surgery found *    ADMITTING DIAGNOSES & HOSPITAL COURSE:       Admission Summary:   Nic Willett is a [de-identified] y.o. female with history of prior pulmonary embolism, pulmonary stenosis, RV failure, HTN, chronic pain, morbid obesity, prior left pleural effusion (?parapneumonic) who presents with shortness of breath x 5 days. She has had multiple recent admissions to [facility] in the past year, most recently seen 12/22-12/29/2016 for chest pain, where she was diagnosed with pneumonia. She had been discharged to home at that time and had reportedly been in her usual state of health until this weekend, when she had developed initially intermittent shortness of breath, which increased in frequency until she woke up this morning and had constant shortness of breath, which prompted her  to call EMS. She had additionally developed abdominal pain with nausea earlier this morning. She was reported to be hypoxic upon EMS arrival with O2 sat 68% on room air. She was placed on 10 liters nasal cannula and subsequently transported to [facility] ED for further evaluation.      She additionally reports subjective fever with measured temperature of T 99F on Friday prior to admission with chills. She denies chest pain, cough, congestion, sick contacts, recent changes in her medications, travel.  Rajinder Arrant  She has actually lost weight per her report.  She denies any increasing lower extremity edema.       Of note, she does state that she had stopped her anticoagulation because she did not like how it had made her feel (she had been discharged on Xarelto in December).    Interval history / Subjective: On rounds on 3/5,awake, disengaged. , daughter and son in law in the room     Daughter has some specific questions such as about her blood pressure,if the blood clot has dissolved and concerned about some edema on the arm. I discussed in detail with them that the bigger picture is that she chose she does not want any treatment And stopped all treatment almost a week ago,wanted only be comfortable. So its not unexpected to see changes in vital signs and her condition that she may even deteriorate rapidly and finally die any time,and whenever that happens we will make sure she is comfortably. However I don't see any sign of death right now,but things may change anytime. She is comfortable at present. They understood and were appreciative.        3/9 discussed with case,  Had recently discussed with daughter as well,  Seems to SNF with family payment is eminent. Medicaid pending. Pt continues as comfort care only   Assessment & Plan:      1. Acute hypoxic respiratory failure  - secondary to multiple PEs;  - patient had stopped Xarelto  -2/27 she has decided to pursue comfort only care and anticoagulations stopped per palliative care team  -Hospice involved,family plan i discharge to a facility with hospice care not home.      2. Hypotension  - Most likely due to sepsis;  - ? Volume depletion, ?  Heart failure - would suspect RV failure  - started on pressors (Levophed and Syed);  - influenza negative  - blood cultures 2/22: no growth;  - urinalysis 2/22 - 10-20 WBCs, 2+ bacteria;  - urine culture: Klebsiella, pansusceptible;   - empiric coverage with Zosyn, vancomycin for now, received levofloxacin in ED  - 2/23: continue pressors;   - 2/24: remains on pressors; still hypotensive; poor urine output. - 2/25: no evidence of bacteremia or Gram positive infection: discontinue Vancomycin, especially since her kidneys are failing; continue Zosyn for now. Add steroids.   -2/27,all medications stopped. Comfort care.      3. Elevated BNP  - uncertain significance, she has some elements in her history consistent with heart failure (ie - nocturnal dyspnea becoming more progressive), but appears volume deplete based upon examination, ? RV failure  - Echo 12/23/16 - EF 55-60%, noted RVSP 50 mm Hg, RV systolic function moderately reduced, moderate hypokinesis of the basal-mid free wall and basal-mid diaphragmatic wall  - repeat Echocardiogram  - give IV fluids for now  - hold home atenolol  - cardiology consulted      4. Lactic acidosis:  - secondary to sepsis and volume contraction;  - received fluids. - blood culture: no growth x 2 days;      5. Elevated transaminases  -likely due to hypoperfusion.      6. Abdominal pain  - wide differential could include cholecystitis vs mesenteric ischemia vs gastroenteritis, etc.  - lipase non-elevated  - pain control;  - CT abdo/pelvis: Hyperemia and straightening/narrowing of the left colon although without mural thickening, nonspecific, possibly inflammatory, infectious or ischemic.  - clinically most consistent with ischemia in the setting of hypotension and hypoperfusion;       7. Bilateral Pulmonary emboli:  - Anticoagulation stopped by palliative care team per pt wish      8. HTN      9. Hyponatremia: resolved;  - suspect this is hypovolemic hyponatremia even with her elevated BNP, will trial fluid hydration;     10. Acute Renal failure:  - Oliguric;   - She would not want dialysis.     Hypothyroidism:  - increase dose of Synthroid;  - TSH 15.50. Hypoxia,  02 sat at rest 87 %     02 2 LPM nasal          Code status: DNR  DVT prophylaxis: Heparin     Care Plan discussed with: Patient/Family and Nurse  Disposition: TBD      Full comfort care. she waxes and wanes,some day alert and other days sleepy.        On 3/5  Daughter has some specific questions such as about her blood pressure,if the blood clot has dissolved and concerned about some edema on the arm. I discussed in detail with them that the bigger picture is that she chose she does not want any treatment And stopped all treatment almost a week ago,wanted only be comfortable. So its not unexpected to see changes in vital signs and her condition that she may even deteriorate rapidly and finally die any time,and whenever that happens we will make sure she is comfortably. However I don't see any sign of death right now,but things may change anytime. She is comfortable at present. They understood and were appreciative.              DISCHARGE DIAGNOSES / PLAN:      1.  as above        PENDING TEST RESULTS:   At the time of discharge the following test results are still pending: na    FOLLOW UP APPOINTMENTS:    Follow-up Information     Follow up With Details Comments Angeles De La Torre MD  pt is a comfort care only as goals of care, f/u as needed [address] [place] [address]  [phone]             ADDITIONAL CARE RECOMMENDATIONS: na    DIET: Regular Diet  Oral Nutritional Supplements: Ensure Complete or Ensure PlusOnce daily    ACTIVITY: Activity as tolerated    WOUND CARE:         Wound Assessment: present on admission. 1. Left buttock minute open area consistent with moisture/friction irritation measured 0.2cm x 0.2cm x <0.1cm, 100% pink, periwound skin darker pigment/hypopigment mix consistent with chronic moisture/friction irritation;  2. Left abdominal panus with open pink area consistent with moisture breakdown 0.5cm x 1.5cm x 0.1cm; marathon applied;      Wound Care Recommendations:   1. Continue with venelex ointment as ordered and zinc cream in between; may tuck gauze in abdominal fold to absorb moisture;        Skin Care/Prevention Recommendations:  1. Continue continence checks;  Apply venelex as ordered and zinc cream with each incontinent episode, use moisturizing cleanser foam to remove soiled surface layer and reapply zinc;   2. Reposition patient approximately every 2 hours. 3. Assess/protect skin in contact with medical devices. Keep skin moisturized. 4. Float Heels with pillow under calves. 5. Continue on total care bed for pressure redistribution;   6. Ensure that patient is repositioning in chair shifting weight approximately every 15 minutes and standing up every hour;             EQUIPMENT needed:        DISCHARGE MEDICATIONS:  Current Discharge Medication List      START taking these medications    Details   albuterol (PROVENTIL VENTOLIN) 2.5 mg /3 mL (0.083 %) nebulizer solution 3 mL by Nebulization route every six (6) hours as needed for Wheezing or Shortness of Breath. Qty: 24 Each, Refills: 0      pantoprazole (PROTONIX) 40 mg tablet Take 1 Tab by mouth Daily (before breakfast). Qty: 30 Tab, Refills: 0      polyvinyl alcohol (LIQUIFILM TEARS) 1.4 % ophthalmic solution Administer 1 Drop to both eyes as needed for up to 30 days. Qty: 15 mL, Refills: 0      LORazepam (ATIVAN) 1 mg tablet Take 1 Tab by mouth nightly. Max Daily Amount: 1 mg. Qty: 30 Tab, Refills: 0      HYDROcodone-acetaminophen (NORCO) 5-325 mg per tablet Take 2 Tabs by mouth every four (4) hours as needed for Pain. Max Daily Amount: 12 Tabs. Qty: 30 Tab, Refills: 0      acetaminophen (TYLENOL) 325 mg tablet Take 2 Tabs by mouth every four (4) hours as needed. Qty: 30 Tab, Refills: 0      balsam peru-castor oil (VENELEX) [id number] mg/gram ointment Apply  to affected area two (2) times a day. APPLY TO sacrum and panus crease    Nursing, document site in comments  Qty: 1 Tube, Refills: 10         CONTINUE these medications which have NOT CHANGED    Details   levothyroxine (SYNTHROID) 25 mcg tablet Take 25 mcg by mouth Daily (before breakfast).          STOP taking these medications       furosemide (LASIX) 40 mg tablet Comments:   Reason for Stopping:         aspirin 81 mg chewable tablet Comments:   Reason for Stopping:         omeprazole (PRILOSEC) 40 mg capsule Comments:   Reason for Stopping:         atenolol (TENORMIN) 50 mg tablet Comments:   Reason for Stopping:         Lactobacillus acidophilus (ACIDOPHILUS) cap Comments:   Reason for Stopping:         amitriptyline (ELAVIL) 100 mg tablet Comments:   Reason for Stopping:         calcium-vitamin D (OYSTER SHELL) 500 mg(1,250mg) -200 unit per tablet Comments:   Reason for Stopping:         multivitamin, tx-iron-ca-min (THERA-M W/ IRON) 9 mg iron-400 mcg tab tablet Comments:   Reason for Stopping:                 NOTIFY YOUR PHYSICIAN FOR ANY OF THE FOLLOWING:   Fever over 101 degrees for 24 hours. Chest pain, shortness of breath, fever, chills, nausea, vomiting, diarrhea, change in mentation, falling, weakness, bleeding. Severe pain or pain not relieved by medications. Or, any other signs or symptoms that you may have questions about.     DISPOSITION:    Home With:   OT  PT  HH  RN      x Long term SNF/Inpatient Rehab    Independent/assisted living    Hospice    Other:       PATIENT CONDITION AT DISCHARGE:     Functional status   x Poor    x Deconditioned     Independent      Cognition     Lucid    x Forgetful     Dementia      Catheters/lines (plus indication)    Ricketts     PICC     PEG    x None      Code status     Full code    x DNR      PHYSICAL EXAMINATION AT DISCHARGE:   Refer to Progress Note       CHRONIC MEDICAL DIAGNOSES:  Problem List as of 3/9/2017  Date Reviewed: 3/9/2017          Codes Class Noted - Resolved    Heart burn ICD-10-CM: R12  ICD-9-CM: 787.1  3/1/2017 - Present        Bacterial pneumonia ICD-10-CM: J15.9  ICD-9-CM: 482.9  10/24/2016 - Present        History of pulmonary embolism ICD-10-CM: L87.314  ICD-9-CM: V12.55  8/1/2016 - Present        Hypertension, essential, benign ICD-10-CM: I10  ICD-9-CM: 401.1  8/1/2016 - Present        Morbid obesity with body mass index of 40.0-49.9 (HCC) ICD-10-CM: E66.01  ICD-9-CM: 278.01  8/1/2016 - Present        Edema ICD-10-CM: R60.9  ICD-9-CM: 782.3  7/14/2015 - Present        RESOLVED: Hypotension ICD-10-CM: I95.9  ICD-9-CM: 458.9  2/22/2017 - 3/9/2017        * (Principal)RESOLVED: Respiratory failure with hypoxia ([facility] ICD-10-CM: J96.91  ICD-9-CM: 518.81  2/22/2017 - 3/9/2017        RESOLVED: Acute respiratory failure with hypoxia and hypercapnia (HCC) ICD-10-CM: J96.01, J96.02  ICD-9-CM: 518.81  12/22/2016 - 12/29/2016        RESOLVED: Pulmonary embolism ([facility] ICD-10-CM: I26.99  ICD-9-CM: 415.19  7/14/2015 - 8/1/2016        RESOLVED: RVF (right ventricular failure) ([facility] ICD-10-CM: I50.9  ICD-9-CM: 428.0  7/14/2015 - 3/9/2017        RESOLVED: Hypertension ICD-10-CM: I10  ICD-9-CM: 401.9  Unknown - 8/1/2016        RESOLVED: Bilateral pulmonary embolism ([facility] ICD-10-CM: I26.99  ICD-9-CM: 415.19  3/11/2015 - 3/24/2015              Greater than 30  minutes were spent with the patient on counseling and coordination of care    Signed:   Shari Johnson MD  3/9/2017  8:50 AM

## 2017-03-09 NOTE — DISCHARGE INSTRUCTIONS
Discharge Instructions       PATIENT ID: Virginia Reese  MRN: [id number]   YOB: 1935    DATE OF ADMISSION: 2/22/2017 11:28 AM    DATE OF DISCHARGE: 3/9/2017    PRIMARY CARE PROVIDER: Abundio Jane MD     ATTENDING PHYSICIAN: Mega Cruz MD  DISCHARGING PROVIDER: Mega Cruz MD    To contact this individual call [phone] and ask the  to page. If unavailable ask to be transferred the Adult Hospitalist Department. DISCHARGE DIAGNOSES pul embolism    CONSULTATIONS: IP CONSULT TO HOSPITALIST  IP CONSULT TO CARDIOLOGY  IP CONSULT TO NEPHROLOGY    PROCEDURES/SURGERIES: * No surgery found *    PENDING TEST RESULTS:   At the time of discharge the following test results are still pending: na    FOLLOW UP APPOINTMENTS:   Follow-up Information     Follow up With Details Comments Contact Buck Mcnair MD  pt is a comfort care only as goals of care, f/u as needed [address] [place] [address]   [address] [place] [address]  [phone]             ADDITIONAL CARE RECOMMENDATIONS: na    DIET: Regular Diet  Oral Nutritional Supplements: Ensure Complete or Ensure Plus Once daily     ACTIVITY: Activity as tolerated    WOUND CARE:         Wound Assessment: present on admission. 1. Left buttock minute open area consistent with moisture/friction irritation measured 0.2cm x 0.2cm x <0.1cm, 100% pink, periwound skin darker pigment/hypopigment mix consistent with chronic moisture/friction irritation;  2. Left abdominal panus with open pink area consistent with moisture breakdown 0.5cm x 1.5cm x 0.1cm; marathon applied;      Wound Care Recommendations:   1. Continue with venelex ointment as ordered and zinc cream in between; may tuck gauze in abdominal fold to absorb moisture;        Skin Care/Prevention Recommendations:  1. Continue continence checks;  Apply venelex as ordered and zinc cream with each incontinent episode, use moisturizing cleanser foam to remove soiled surface layer and reapply zinc;   2. Reposition patient approximately every 2 hours. 3. Assess/protect skin in contact with medical devices. Keep skin moisturized. 4. Float Heels with pillow under calves. 5. Continue on total care bed for pressure redistribution;   6. Ensure that patient is repositioning in chair shifting weight approximately every 15 minutes and standing up every hour;             EQUIPMENT needed:        DISCHARGE MEDICATIONS:   See Medication Reconciliation Form    · It is important that you take the medication exactly as they are prescribed. · Keep your medication in the bottles provided by the pharmacist and keep a list of the medication names, dosages, and times to be taken in your wallet. · Do not take other medications without consulting your doctor. NOTIFY YOUR PHYSICIAN FOR ANY OF THE FOLLOWING:   Fever over 101 degrees for 24 hours. Chest pain, shortness of breath, fever, chills, nausea, vomiting, diarrhea, change in mentation, falling, weakness, bleeding. Severe pain or pain not relieved by medications. Or, any other signs or symptoms that you may have questions about.       DISPOSITION:    Home With:   OT  PT  HH  RN      x SNF/Inpatient Rehab/LTAC    Independent/assisted living    Hospice    Other:          Signed:   Shari Johnson MD  3/9/2017  8:49 AM

## 2017-03-09 NOTE — HOSPICE
[facility] HOSPICE MSW NOTE:    MSW spoke with CM regarding disposition. Patient will be going to [facility] with comfort care. [facility] will sign off. Thank-you for this referral and the opportunity to be involved in this patients care.     [address] [place] [address]  [phone]

## 2017-03-09 NOTE — PROGRESS NOTES
8:59 AM  CM received a call from daughter, Brenda Fernandez. Ebonie Ballesteros stated that family would like to move forward with [address] for discharge. CM spoke with [facility] at [facility] and provided update [phone] that family would like for patient to transition to their facility and that patient is ready for discharge today. Nestor wants to ensure that facility is able to skill patient's wound. He indicated that he would give CM a call back once confirmed. CM awaiting approval.    CM contacted Malgorzata Hernández at [place] [phone] to provide an update. No one was present at the time of the call. CM left a message with a brief update and requested a return call.    9:22 AM  CM received a return call from Malgorzata Hernández w/[employer]. CM provided update. CM was informed that [facility] was interested and possibly willing to work with family in regards to finances. CM will reach out to patient's daughter and provide update. CM received a call from [facility] at [address] [place] [address]. CM was informed at this time they are unable to accept patient due to not being able to skill for services. Patient and family would have to pay out of pocket for stay until Medicaid would kick in.    CM contacted patient's daughter, Brenda Fernandez to provide update. Daughter was upset. CM provided support and education on Medicare and Medicaid. CM did inform daughter that [facility] was interested. Daughter would like to review information with patient's spouse before moving forward with any decisions. CM contacted Malgorzata Hernández at [facility] and provided the newest update. CM will continue to follow and assist with disposition needs. 11:43 AM  CM received a call from patient's daughter. Daughter expressed that her and her father feel that [facility] is too farfrom their home. Daughter requested that referrals be submitted to: [facility]. CM submitted referrals via all scripts.   Patient's daughter asked if facilities would be willing to work with their family financially until [place] becomes active. CM shared with patient, spouse, and daughter that they would need to have a discussion with facility in reference to financial obligations. CM received a call from [facility] stating that they are willing to accept patient. CM will provide updated information to daughter, spouse, and [facility], Quinn Verdin. Patient's spouse is currently reaching out to [facility] to review and discuss private pay options. 1:29 PM  CM met with patient and spouse. Spouse informed CM to move forward with plans for patient to transition to [facility] at discharge. CM spoke with patient's daughter, Shyam Melissa who confirmed as well. CM contacted Quinn Verdin with [phone] to provide update    CM submitted a message via all scripts informing [facility] admissions that family is wanting patient to transition to their care after D/C. CM also spoke with Julio Waddell [phone]. Facility is requesting that patient be discharged to their facility by 10:00 am.  CM provided update to patient, spouse, and daughter. CM spoke with attending. Patient to discharge 3/10/17 to [facility] via ambulance.   CM requested ambulance transport for 10:00 am.    KRISTA Bell/DIANE

## 2017-03-09 NOTE — PROGRESS NOTES
Bedside shift change report given to [facility] (oncoming nurse) by Debby Batista (offgoing nurse). Report included the following information SBAR, Kardex, MAR and Recent Results.

## 2017-03-09 NOTE — PROGRESS NOTES
Bedside shift change report given to Tenisha Huggins RN and [facility], RN (oncoming nurse) by Cheli Barrios RN (offgoing nurse). Report included the following information SBAR and Kardex.

## 2017-03-09 NOTE — PROGRESS NOTES
Assessemnt/Plan:   Daily Progress Note: 3/9/2017    Admit date: 2017 11:28 AM    Hospitalist Progress Note   Lisa Mackenzie Molly [phone]; Call physician on-call through the  7pm-7am          PCP: Lucina Yan MD   In Hospital Procedure:   * No surgery found *  Consultants this Hospitalization:   IP CONSULT TO HOSPITALIST  IP CONSULT TO CARDIOLOGY  IP CONSULT TO [address] [place] [address] Procedure:   * No surgery found *              Date of Service:  3/9/2017   NAME:  lEsa Lowe      :  1961  MRN:  [id number]    Admission Summary:   Elsa Lowe is a [de-identified] y.o. female with history of prior pulmonary embolism, pulmonary stenosis, RV failure, HTN, chronic pain, morbid obesity, prior left pleural effusion (?parapneumonic) who presents with shortness of breath x 5 days. She has had multiple recent admissions to [facility] in the past year, most recently seen -2016 for chest pain, where she was diagnosed with pneumonia. She had been discharged to home at that time and had reportedly been in her usual state of health until this weekend, when she had developed initially intermittent shortness of breath, which increased in frequency until she woke up this morning and had constant shortness of breath, which prompted her  to call EMS. She had additionally developed abdominal pain with nausea earlier this morning. She was reported to be hypoxic upon EMS arrival with O2 sat 68% on room air. She was placed on 10 liters nasal cannula and subsequently transported to [facility] ED for further evaluation.      She additionally reports subjective fever with measured temperature of T 99F on Friday prior to admission with chills. She denies chest pain, cough, congestion, sick contacts, recent changes in her medications, travel.  [facility]  She has actually lost weight per her report.  She denies any increasing lower extremity edema.       Of note, she does state that she had stopped her anticoagulation because she did not like how it had made her feel (she had been discharged on Xarelto in December).    Interval history / Subjective:   3/9/2017 : again: Cont comfort care as per record,  Discussed case with case management and with daughter       Assessment & Plan:      1. Acute hypoxic respiratory failure  - secondary to multiple PEs;  - patient had stopped Xarelto  -2/27 she has decided to pursue comfort only care and anticoagulations stopped per palliative care team  -Hospice involved,family plan i discharge to a facility with hospice care not home.      2. Hypotension  - Most likely due to sepsis;  - ? Volume depletion, ? Heart failure - would suspect RV failure  - started on pressors (Levophed and Syed);  - influenza negative  - blood cultures 2/22: no growth;  - urinalysis 2/22 - 10-20 WBCs, 2+ bacteria;  - urine culture: Klebsiella, pansusceptible;   - empiric coverage with Zosyn, vancomycin for now, received levofloxacin in ED  - 2/23: continue pressors;   - 2/24: remains on pressors; still hypotensive; poor urine output. - 2/25: no evidence of bacteremia or Gram positive infection: discontinue Vancomycin, especially since her kidneys are failing; continue Zosyn for now. Add steroids.   -2/27,all medications stopped. Comfort care.      3. Elevated BNP  - uncertain significance, she has some elements in her history consistent with heart failure (ie - nocturnal dyspnea becoming more progressive), but appears volume deplete based upon examination, ? RV failure  - Echo 12/23/16 - EF 55-60%, noted RVSP 50 mm Hg, RV systolic function moderately reduced, moderate hypokinesis of the basal-mid free wall and basal-mid diaphragmatic wall  - repeat Echocardiogram  - give IV fluids for now  - hold home atenolol  - cardiology consulted      4. Lactic acidosis:  - secondary to sepsis and volume contraction;  - received fluids. - blood culture: no growth x 2 days;      5.  Elevated transaminases  -likely due to hypoperfusion.      6. Abdominal pain  - wide differential could include cholecystitis vs mesenteric ischemia vs gastroenteritis, etc.  - lipase non-elevated  - pain control;  - CT abdo/pelvis: Hyperemia and straightening/narrowing of the left colon although without mural thickening, nonspecific, possibly inflammatory, infectious or ischemic.  - clinically most consistent with ischemia in the setting of hypotension and hypoperfusion;       7. Bilateral Pulmonary emboli:  - Anticoagulation stopped by palliative care team per pt wish      8. HTN      9. Hyponatremia: resolved;  - suspect this is hypovolemic hyponatremia even with her elevated BNP, will trial fluid hydration;     10. Acute Renal failure:  - Oliguric;   - She would not want dialysis.     Hypothyroidism:  - increase dose of Synthroid;  - TSH 15.50.         Code status: DNR  DVT prophylaxis: Heparin     Care Plan discussed with: Patient/Family and Nurse  Disposition: TBD      Full comfort care. she waxes and wanes,some day alert and other days sleepy.                Subjective:          Review of Systems:    ·  constitutional malaise ·  genitourinary neg     ·  eyes   ·  musculoskeletal neg     ·  ENT: --ears  ·  neurologic  neg      --nose/sinuses  ·  psychiatric      --mouth/thorat  ·  endocrine     --neck  ·  peripheral vascular    ·  respiratory neg   ·  Skin/breast    ·  cardiac neg   ·  Hematologic/lymph    ·  gastroentestinal neg   ·  Allergy/immunologic          Could NOT obtain due to:       Objective:     VITALS:   Last 24hrs VS reviewed since prior progress note.  Most recent are:  Patient Vitals for the past 24 hrs:   Temp Pulse Resp BP SpO2   03/09/17 0851 97.9 °F (36.6 °C) 88 18 117/72 90 %   03/09/17 0252 98.1 °F (36.7 °C) 94 20 130/73 90 %   03/08/17 2019 97.9 °F (36.6 °C) 88 19 113/64 90 %   03/08/17 1437 98.8 °F (37.1 °C) 87 18 113/65 93 %       Intake/Output Summary (Last 24 hours) at 03/09/17 1323  Last data filed at 03/09/17 8524   Gross per 24 hour   Intake              480 ml   Output              650 ml   Net             -170 ml        PHYSICAL EXAM:  General appearance: no acute distress  ,  Alert ;  conversant ;  Eyes: anicteric sclerae, moist conjunctivae; no lid-lag; PERRLA;  HENT: Atraumatic; oropharynx clear with moist mucous membranes and no mucosal ulcerations; normal hard and soft palate;  Neck: Trachea midline; FROM, supple, no thyromegaly or lymphadenopathy;  Lungs: CTA, with normal respiratory effort and no intercostal retractions  CV: RRR, no MRGs ; Abdomen: Soft, non-tender;    Extremities: No   peripheral edema or extremity lymphadenopathy;  Skin: Normal temperature  ; turgor is  nl ; the texture  nl ; no rash,    Neuro: CN 2- 12 intact   Psych: Appropriate  affect, alert   and oriented to person, place and time ;             Reviewed most current radiology test results   y  Review and summation of old records today   y  Reviewed patient's current orders and MAR   y  PMH/ reviewed - no change compared to H&P  ______________________________________________________________________  Medicines:    Current Facility-Administered Medications:     erythromycin (ILOTYCIN) 5 mg/gram (0.5 %) ophthalmic ointment, , Both Eyes, Q8H, SWYIMXP FATUMA Salgado MD    polyvinyl alcohol (LIQUIFILM TEARS) 1.4 % ophthalmic solution 1 Drop, 1 Drop, Both Eyes, PRN, Sherrell Walker MD, 1 Drop at 03/08/17 2222    albuterol (PROVENTIL VENTOLIN) nebulizer solution 2.5 mg, 2.5 mg, Nebulization, Q6H PRN, XBRATBU FATUMA Salgado MD, 2.5 mg at 03/01/17 1303    pantoprazole (PROTONIX) tablet 40 mg, 40 mg, Oral, ACB, Elda Feldman MD, 40 mg at 03/09/17 0636    LORazepam (ATIVAN) tablet 1 mg, 1 mg, Oral, QHS, Debbie Vega MD, 1 mg at 03/08/17 2222    HYDROcodone-acetaminophen (NORCO) 5-325 mg per tablet 2 Tab, 2 Tab, Oral, Q6HWA, Enedina Patton MD, 2 Tab at 03/09/17 1138    LORazepam (ATIVAN) injection 1 mg, 1 mg, IntraVENous, Q15MIN PRN, Julieta Pitts MD    glycopyrrolate (ROBINUL) injection 0.2 mg, 0.2 mg, IntraVENous, Q4H PRN, Julieta Pitts MD    fentaNYL citrate (PF) injection 25 mcg, 25 mcg, IntraVENous, Q15MIN PRN, Julieta Pitts MD    0.9% sodium chloride infusion, 3 mL/hr, IntraVENous, CONTINUOUS, Celine Heller MD, Stopped at 02/27/17 1152    sodium chloride (NS) flush 20 mL, 20 mL, IntraVENous, ONCE PRN, Celine Heller MD    balsam peru-castor oil [facility])  mg/gram ointment, , Topical, BID, Arline Colon MD    sodium chloride (NS) flush 5-10 mL, 5-10 mL, IntraVENous, Q8H, Celine Heller MD, 10 mL at 03/09/17 0616    sodium chloride (NS) flush 5-10 mL, 5-10 mL, IntraVENous, PRN, Celine Heller MD, 10 mL at 02/26/17 0217    HYDROmorphone (PF) (DILAUDID) injection 0.5 mg, 0.5 mg, IntraVENous, Q4H PRN, Celine Heller MD, 0.5 mg at 02/28/17 1509    acetaminophen (TYLENOL) tablet 650 mg, 650 mg, Oral, Q4H PRN, Celine Heller MD, 650 mg at 03/08/17 2352    ondansetron (ZOFRAN) injection 4 mg, 4 mg, IntraVENous, Q6H PRN, Celine Heller MD  Procedures: see electronic medical records for all procedures/Xrays and details which were not copied into this note but were reviewed prior to creation of Plan. LABS:  No results for input(s): WBC, HGB, HCT, PLT, HGBEXT, HCTEXT, PLTEXT in the last 72 hours. No results for input(s): NA, K, CL, CO2, BUN, CREA, GLU, CA, MG, PHOS, URICA in the last 72 hours. No results for input(s): SGOT, GPT, ALT, AP, TBIL, TBILI, TP, ALB, GLOB, GGT, AML, LPSE in the last 72 hours. No lab exists for component: AMYP, HLPSE  No results for input(s): INR, PTP, APTT in the last 72 hours. No lab exists for component: INREXT   No results for input(s): FE, TIBC, PSAT, FERR in the last 72 hours. No results found for: FOL, RBCF   No results for input(s): PH, PCO2, PO2 in the last 72 hours. No results for input(s): PHI, PO2I, PCO2I in the last 72 hours.   No results for input(s): CPK, CKNDX, TROIQ in the last 72 hours. No lab exists for component: CPKMB  Lab Results   Component Value Date/Time    Cholesterol, total 78 10/26/2016 03:55 AM    HDL Cholesterol 19 10/26/2016 03:55 AM    LDL, calculated 40.6 10/26/2016 03:55 AM    Triglyceride 92 10/26/2016 03:55 AM    CHOL/HDL Ratio 4.1 10/26/2016 03:55 AM     Lab Results   Component Value Date/Time    Glucose (POC) 121 10/25/2016 09:22 AM     Lab Results   Component Value Date/Time    Color DARK YELLOW 02/22/2017 12:02 PM    Appearance CLOUDY 02/22/2017 12:02 PM    Specific gravity 1.020 02/22/2017 12:02 PM    pH (UA) 5.0 02/22/2017 12:02 PM    Protein NEGATIVE  02/22/2017 12:02 PM    Glucose NEGATIVE  02/22/2017 12:02 PM    Ketone NEGATIVE  02/22/2017 12:02 PM    Bilirubin NEGATIVE  12/22/2016 11:01 PM    Urobilinogen 1.0 02/22/2017 12:02 PM    Nitrites NEGATIVE  02/22/2017 12:02 PM    Leukocyte Esterase LARGE 02/22/2017 12:02 PM    Epithelial cells FEW 02/22/2017 12:02 PM    Bacteria 2+ 02/22/2017 12:02 PM    WBC 10-20 02/22/2017 12:02 PM    RBC 0-5 02/22/2017 12:02 PM           CULTURES:    No results found for: [facility] Lab Results   Component Value Date/Time    Culture result: NO GROWTH 5 DAYS 02/22/2017 12:13 PM    Culture result: ESCHERICHIA COLI 12/22/2016 11:01 PM    Culture result: KLEBSIELLA PNEUMONIAE 12/22/2016 11:01 PM    Culture result: NO GROWTH 5 DAYS 12/22/2016 10:00 PM    Culture result: MODERATE  YEAST  ONLY   10/26/2016 11:45 AM    Culture result: NO NORMAL RESPIRATORY ROSALES ISOLATED 10/26/2016 11:45 AM        CT Results (most recent):    Results from [place] encounter on 02/22/17   CT ABD PELV W CONT   Narrative INDICATION: Abdominal pain    COMPARISON: 1/26/2006. TECHNIQUE:   Following the uneventful intravenous administration of 100 cc Isovue-370, thin  axial images were obtained through the abdomen and pelvis. Coronal and sagittal  reconstructions were generated. Oral contrast was not administered.  CT dose  reduction was achieved through use of a standardized protocol tailored for this  examination and automatic exposure control for dose modulation. FINDINGS:   LUNG BASES: Left pleural effusion and pulmonary emboli. INCIDENTALLY IMAGED HEART AND MEDIASTINUM: Unremarkable. LIVER: Marked steatosis. GALLBLADDER: Absent. SPLEEN: Absent. PANCREAS: Atrophic. ADRENALS: Unremarkable. KIDNEYS: No mass, calculus, or hydronephrosis. Bilateral renal cysts. STOMACH: Previous surgery. Nondistended. SMALL BOWEL: No dilatation or wall thickening. COLON: New straightening and narrowing of the left colon with circumferential  mucosal/mural hyperemia, without pericolonic inflammation, mass, diverticula,  perforation or abscess. APPENDIX: Not seen. PERITONEUM: No ascites or pneumoperitoneum. RETROPERITONEUM: No lymphadenopathy or aortic aneurysm. REPRODUCTIVE ORGANS: Unremarkable. URINARY BLADDER: No mass or calculus. BONES: Previous lumbar decompression and instrumented fusion. ADDITIONAL COMMENTS: [place] upper abdominal wall midline hernias containing  nonobstructed noninflamed bowel. Impression IMPRESSION:  Hyperemia and straightening/narrowing of the left colon although without mural  thickening, nonspecific, possibly inflammatory, infectious or ischemic.           Azra Guzman MD

## 2017-03-10 NOTE — PROGRESS NOTES
CM notified of discharge orders in place for patient. Patient is being discharged to [address]. Transportation is scheduled for 10:00 am through [facility]. Call Report to: [phone] and ask to speak to the second floor nurses station to give report. CM faxed AVS, H&P, and [facility]AI  to [phone]. There are no other disposition needs at this time. Family will be meeting patient at facility.     KRISTA Hollingsworth/DIANE  8:50 AM

## 2017-03-10 NOTE — PROGRESS NOTES
TRANSFER - OUT REPORT:    Verbal report given to [facility] AT YINKA REDDY RN(name) on Bree Rios  being transferred to [place]. Report consisted of patients Situation, Background, Assessment and   Recommendations(SBAR). Information from the following report(s) SBAR, Intake/Output, MAR and Recent Results was reviewed with the receiving nurse. Lines:   none    Opportunity for questions and clarification was provided.       Patient transported with:   O2 @ 4 liters

## 2017-03-10 NOTE — PROGRESS NOTES
Bedside shift change report given to [address] (oncoming nurse) by Shawn Sanches (offgoing nurse). Report included the following information SBAR.

## 2017-05-10 ENCOUNTER — DOCUMENTATION ONLY (OUTPATIENT)
Dept: CARDIOLOGY CLINIC | Age: 82
End: 2017-05-10

## 2018-09-26 NOTE — PROGRESS NOTES
Follow up visit with patient and family at request of Dr. Imelda Barr. Spoke with daughter Genesis Todd, daughter Patrizia Beck, and  Gricelda Mack. All are aware of the shift to comfort care and the pending move to [address]. ..as well as a possible referral to [facility]. All involved voiced their understanding and acceptance, given her physical state.  Gricelda Mack asked questions about the cremation niches at [facility], where they are members. Suggested calling [place] [phone] for more information directly from staff.   Provided assurance of prayer and reminded of continued  availability upon request.    [address] [facility],Third Floor  [phone]